# Patient Record
Sex: FEMALE | Race: BLACK OR AFRICAN AMERICAN | NOT HISPANIC OR LATINO | Employment: FULL TIME
[De-identification: names, ages, dates, MRNs, and addresses within clinical notes are randomized per-mention and may not be internally consistent; named-entity substitution may affect disease eponyms.]

---

## 2022-12-05 ENCOUNTER — HOSPITAL ENCOUNTER (OUTPATIENT)
Dept: DATA CONVERSION | Age: 57
End: 2022-12-05
Attending: EMERGENCY MEDICINE

## 2023-03-20 DIAGNOSIS — F17.200 NICOTINE DEPENDENCE, UNCOMPLICATED, UNSPECIFIED NICOTINE PRODUCT TYPE: Primary | ICD-10-CM

## 2023-03-20 RX ORDER — VARENICLINE TARTRATE 1 MG/1
1 TABLET, FILM COATED ORAL 2 TIMES DAILY
Qty: 180 TABLET | Refills: 1 | Status: SHIPPED | OUTPATIENT
Start: 2023-03-20 | End: 2023-08-15 | Stop reason: ALTCHOICE

## 2023-04-06 DIAGNOSIS — Z72.0 TOBACCO USE: Primary | ICD-10-CM

## 2023-06-09 DIAGNOSIS — E11.69 TYPE 2 DIABETES MELLITUS WITH OTHER SPECIFIED COMPLICATION, WITH LONG-TERM CURRENT USE OF INSULIN (MULTI): Primary | ICD-10-CM

## 2023-06-09 DIAGNOSIS — Z79.4 TYPE 2 DIABETES MELLITUS WITH OTHER SPECIFIED COMPLICATION, WITH LONG-TERM CURRENT USE OF INSULIN (MULTI): Primary | ICD-10-CM

## 2023-06-09 DIAGNOSIS — E78.5 HYPERLIPIDEMIA, UNSPECIFIED HYPERLIPIDEMIA TYPE: ICD-10-CM

## 2023-06-09 DIAGNOSIS — K21.9 GASTROESOPHAGEAL REFLUX DISEASE, UNSPECIFIED WHETHER ESOPHAGITIS PRESENT: ICD-10-CM

## 2023-06-09 RX ORDER — AMLODIPINE BESYLATE 10 MG/1
1 TABLET ORAL DAILY
COMMUNITY
Start: 2015-08-12 | End: 2023-08-15 | Stop reason: SDUPTHER

## 2023-06-09 RX ORDER — ATORVASTATIN CALCIUM 40 MG/1
40 TABLET, FILM COATED ORAL DAILY
Qty: 90 TABLET | Refills: 0 | Status: SHIPPED | OUTPATIENT
Start: 2023-06-09 | End: 2023-08-31

## 2023-06-09 RX ORDER — INSULIN GLARGINE 100 [IU]/ML
INJECTION, SOLUTION SUBCUTANEOUS
Qty: 15 ML | Refills: 0 | Status: SHIPPED | OUTPATIENT
Start: 2023-06-09 | End: 2023-08-26

## 2023-06-09 RX ORDER — BLOOD SUGAR DIAGNOSTIC
STRIP MISCELLANEOUS
Qty: 400 STRIP | Refills: 0 | Status: SHIPPED | OUTPATIENT
Start: 2023-06-09 | End: 2023-08-15 | Stop reason: SDUPTHER

## 2023-06-09 RX ORDER — INSULIN GLARGINE 100 [IU]/ML
INJECTION, SOLUTION SUBCUTANEOUS
COMMUNITY
Start: 2021-04-29 | End: 2023-06-09 | Stop reason: SDUPTHER

## 2023-06-09 RX ORDER — OMEPRAZOLE 20 MG/1
20 CAPSULE, DELAYED RELEASE ORAL
COMMUNITY
Start: 2022-09-30 | End: 2023-06-09 | Stop reason: SDUPTHER

## 2023-06-09 RX ORDER — BLOOD SUGAR DIAGNOSTIC
STRIP MISCELLANEOUS 4 TIMES DAILY
COMMUNITY
Start: 2022-05-10 | End: 2023-06-09 | Stop reason: SDUPTHER

## 2023-06-09 RX ORDER — ATORVASTATIN CALCIUM 40 MG/1
40 TABLET, FILM COATED ORAL DAILY
COMMUNITY
Start: 2018-09-10 | End: 2023-06-09 | Stop reason: SDUPTHER

## 2023-06-09 RX ORDER — OMEPRAZOLE 20 MG/1
20 CAPSULE, DELAYED RELEASE ORAL
Qty: 90 CAPSULE | Refills: 0 | Status: SHIPPED | OUTPATIENT
Start: 2023-06-09 | End: 2023-09-28

## 2023-06-23 RX ORDER — NICOTINE 7MG/24HR
PATCH, TRANSDERMAL 24 HOURS TRANSDERMAL
Qty: 14 PATCH | Refills: 0 | Status: SHIPPED | OUTPATIENT
Start: 2023-06-23 | End: 2023-08-15 | Stop reason: WASHOUT

## 2023-07-25 ENCOUNTER — PATIENT MESSAGE (OUTPATIENT)
Dept: PRIMARY CARE | Facility: CLINIC | Age: 58
End: 2023-07-25
Payer: COMMERCIAL

## 2023-07-25 DIAGNOSIS — Z00.00 HEALTHCARE MAINTENANCE: Primary | ICD-10-CM

## 2023-07-27 DIAGNOSIS — N64.89 BREAST ASYMMETRY: Primary | ICD-10-CM

## 2023-08-02 DIAGNOSIS — I10 PRIMARY HYPERTENSION: Primary | ICD-10-CM

## 2023-08-03 RX ORDER — LOSARTAN POTASSIUM 50 MG/1
50 TABLET ORAL DAILY
Qty: 90 TABLET | Refills: 0 | Status: SHIPPED | OUTPATIENT
Start: 2023-08-03 | End: 2023-08-15 | Stop reason: ALTCHOICE

## 2023-08-03 RX ORDER — METOPROLOL SUCCINATE 50 MG/1
50 TABLET, EXTENDED RELEASE ORAL DAILY
Qty: 90 TABLET | Refills: 0 | Status: SHIPPED | OUTPATIENT
Start: 2023-08-03 | End: 2023-10-26

## 2023-08-15 ENCOUNTER — OFFICE VISIT (OUTPATIENT)
Dept: PRIMARY CARE | Facility: CLINIC | Age: 58
End: 2023-08-15
Payer: COMMERCIAL

## 2023-08-15 VITALS
WEIGHT: 221.6 LBS | OXYGEN SATURATION: 97 % | BODY MASS INDEX: 36.92 KG/M2 | SYSTOLIC BLOOD PRESSURE: 108 MMHG | HEART RATE: 67 BPM | HEIGHT: 65 IN | DIASTOLIC BLOOD PRESSURE: 52 MMHG | RESPIRATION RATE: 16 BRPM

## 2023-08-15 DIAGNOSIS — F17.200 NICOTINE DEPENDENCE, UNCOMPLICATED, UNSPECIFIED NICOTINE PRODUCT TYPE: ICD-10-CM

## 2023-08-15 DIAGNOSIS — Z79.4 TYPE 2 DIABETES MELLITUS WITH OTHER SPECIFIED COMPLICATION, WITH LONG-TERM CURRENT USE OF INSULIN (MULTI): ICD-10-CM

## 2023-08-15 DIAGNOSIS — Z23 ENCOUNTER FOR ADMINISTRATION OF VACCINE: ICD-10-CM

## 2023-08-15 DIAGNOSIS — N18.2 CKD (CHRONIC KIDNEY DISEASE) STAGE 2, GFR 60-89 ML/MIN: ICD-10-CM

## 2023-08-15 DIAGNOSIS — I10 PRIMARY HYPERTENSION: ICD-10-CM

## 2023-08-15 DIAGNOSIS — E11.69 TYPE 2 DIABETES MELLITUS WITH OTHER SPECIFIED COMPLICATION, WITH LONG-TERM CURRENT USE OF INSULIN (MULTI): ICD-10-CM

## 2023-08-15 DIAGNOSIS — E78.2 MIXED HYPERLIPIDEMIA: Primary | ICD-10-CM

## 2023-08-15 PROBLEM — E78.5 HYPERLIPIDEMIA: Status: ACTIVE | Noted: 2023-08-15

## 2023-08-15 PROBLEM — E11.9 DIABETES (MULTI): Status: ACTIVE | Noted: 2023-08-15
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
## 2023-08-15 PROBLEM — M65.949 TENOSYNOVITIS OF FINGER: Status: ACTIVE | Noted: 2023-08-15

## 2023-08-15 PROBLEM — G47.33 OSA (OBSTRUCTIVE SLEEP APNEA): Status: ACTIVE | Noted: 2023-08-15

## 2023-08-15 PROBLEM — G62.9 NEUROPATHY: Status: ACTIVE | Noted: 2023-08-15

## 2023-08-15 PROBLEM — F33.1 MODERATE EPISODE OF RECURRENT MAJOR DEPRESSIVE DISORDER (MULTI): Chronic | Status: ACTIVE | Noted: 2021-08-18

## 2023-08-15 PROBLEM — M20.42 HAMMER TOES, BILATERAL: Status: ACTIVE | Noted: 2023-08-15

## 2023-08-15 PROBLEM — B96.89 BACTERIAL VAGINOSIS: Status: ACTIVE | Noted: 2023-08-15

## 2023-08-15 PROBLEM — M17.12 ARTHRITIS OF KNEE, LEFT: Status: ACTIVE | Noted: 2023-08-15

## 2023-08-15 PROBLEM — H81.10 BENIGN POSITIONAL VERTIGO: Status: ACTIVE | Noted: 2023-08-15

## 2023-08-15 PROBLEM — E11.9 ENCOUNTER FOR DIABETIC FOOT EXAM (MULTI): Status: ACTIVE | Noted: 2023-08-15

## 2023-08-15 PROBLEM — H52.03 HYPERMETROPIA OF BOTH EYES: Status: ACTIVE | Noted: 2023-08-15

## 2023-08-15 PROBLEM — N76.0 BACTERIAL VAGINOSIS: Status: ACTIVE | Noted: 2023-08-15

## 2023-08-15 PROBLEM — L84 CALLUS: Status: ACTIVE | Noted: 2023-08-15

## 2023-08-15 PROBLEM — R11.0 NAUSEA IN ADULT: Status: ACTIVE | Noted: 2023-08-15

## 2023-08-15 PROBLEM — H54.7 POOR VISION: Status: ACTIVE | Noted: 2023-08-15

## 2023-08-15 PROBLEM — J44.9 COPD (CHRONIC OBSTRUCTIVE PULMONARY DISEASE) (MULTI): Status: ACTIVE | Noted: 2023-08-15

## 2023-08-15 PROBLEM — J20.9 ACUTE BRONCHITIS: Status: ACTIVE | Noted: 2023-08-15

## 2023-08-15 PROBLEM — F41.1 GENERALIZED ANXIETY DISORDER: Status: ACTIVE | Noted: 2021-08-18

## 2023-08-15 PROBLEM — E11.40 DIABETIC NEUROPATHY (MULTI): Status: ACTIVE | Noted: 2023-08-15

## 2023-08-15 PROBLEM — N89.8 VAGINAL DISCHARGE: Status: ACTIVE | Noted: 2023-08-15

## 2023-08-15 PROBLEM — F10.21 ALCOHOL DEPENDENCE, IN REMISSION (MULTI): Chronic | Status: ACTIVE | Noted: 2021-08-18

## 2023-08-15 PROBLEM — M19.90 OSTEOARTHRITIS, CHRONIC: Status: ACTIVE | Noted: 2023-08-15

## 2023-08-15 PROBLEM — E11.9 TYPE 2 DIABETES MELLITUS WITHOUT COMPLICATIONS (MULTI): Status: ACTIVE | Noted: 2023-08-15

## 2023-08-15 PROBLEM — K21.9 GERD (GASTROESOPHAGEAL REFLUX DISEASE): Status: ACTIVE | Noted: 2023-08-15

## 2023-08-15 PROBLEM — N89.8 VAGINAL ITCHING: Status: ACTIVE | Noted: 2023-08-15

## 2023-08-15 PROBLEM — R29.818 SUSPECTED SLEEP APNEA: Status: ACTIVE | Noted: 2023-08-15

## 2023-08-15 PROBLEM — H61.21 IMPACTED CERUMEN OF RIGHT EAR: Status: ACTIVE | Noted: 2023-08-15

## 2023-08-15 PROBLEM — N95.1 HOT FLUSHES, PERIMENOPAUSAL: Status: ACTIVE | Noted: 2023-08-15

## 2023-08-15 PROBLEM — F12.21 CANNABIS DEPENDENCE, IN REMISSION (MULTI): Chronic | Status: ACTIVE | Noted: 2021-08-18

## 2023-08-15 PROBLEM — M65.9 TENOSYNOVITIS OF FINGER: Status: ACTIVE | Noted: 2023-08-15

## 2023-08-15 PROBLEM — N18.9 CHRONIC KIDNEY DISEASE, UNSPECIFIED: Status: ACTIVE | Noted: 2021-08-18

## 2023-08-15 PROBLEM — M20.41 HAMMER TOES, BILATERAL: Status: ACTIVE | Noted: 2023-08-15

## 2023-08-15 PROBLEM — F32.A DEPRESSION: Status: ACTIVE | Noted: 2023-08-15

## 2023-08-15 PROBLEM — H52.13 MYOPIA OF BOTH EYES: Status: ACTIVE | Noted: 2023-08-15

## 2023-08-15 PROBLEM — A59.9 TRICHOMONAS INFECTION: Status: ACTIVE | Noted: 2023-08-15

## 2023-08-15 PROBLEM — G47.00 INSOMNIA: Status: ACTIVE | Noted: 2023-08-15

## 2023-08-15 PROBLEM — E66.01 MORBID OBESITY (MULTI): Status: ACTIVE | Noted: 2023-08-15

## 2023-08-15 PROCEDURE — 99396 PREV VISIT EST AGE 40-64: CPT | Performed by: INTERNAL MEDICINE

## 2023-08-15 PROCEDURE — 90471 IMMUNIZATION ADMIN: CPT | Performed by: INTERNAL MEDICINE

## 2023-08-15 PROCEDURE — 90472 IMMUNIZATION ADMIN EACH ADD: CPT | Performed by: INTERNAL MEDICINE

## 2023-08-15 PROCEDURE — 3074F SYST BP LT 130 MM HG: CPT | Performed by: INTERNAL MEDICINE

## 2023-08-15 PROCEDURE — 3078F DIAST BP <80 MM HG: CPT | Performed by: INTERNAL MEDICINE

## 2023-08-15 PROCEDURE — 4004F PT TOBACCO SCREEN RCVD TLK: CPT | Performed by: INTERNAL MEDICINE

## 2023-08-15 PROCEDURE — 3052F HG A1C>EQUAL 8.0%<EQUAL 9.0%: CPT | Performed by: INTERNAL MEDICINE

## 2023-08-15 PROCEDURE — 90686 IIV4 VACC NO PRSV 0.5 ML IM: CPT | Performed by: INTERNAL MEDICINE

## 2023-08-15 PROCEDURE — 90677 PCV20 VACCINE IM: CPT | Performed by: INTERNAL MEDICINE

## 2023-08-15 RX ORDER — MULTIVITAMIN
1 TABLET ORAL DAILY
COMMUNITY
Start: 2020-05-04

## 2023-08-15 RX ORDER — LISINOPRIL 20 MG/1
1 TABLET ORAL 2 TIMES DAILY
COMMUNITY
Start: 2016-06-16 | End: 2023-08-15 | Stop reason: ALTCHOICE

## 2023-08-15 RX ORDER — BLOOD SUGAR DIAGNOSTIC
STRIP MISCELLANEOUS
Qty: 400 STRIP | Refills: 0 | Status: SHIPPED | OUTPATIENT
Start: 2023-08-15 | End: 2024-01-29

## 2023-08-15 RX ORDER — INSULIN LISPRO 100 [IU]/ML
INJECTION, SOLUTION INTRAVENOUS; SUBCUTANEOUS
COMMUNITY
Start: 2021-03-12 | End: 2023-08-15 | Stop reason: SDUPTHER

## 2023-08-15 RX ORDER — FLASH GLUCOSE SENSOR
KIT MISCELLANEOUS
Qty: 1 EACH | Refills: 0 | Status: SHIPPED | OUTPATIENT
Start: 2023-08-15 | End: 2023-08-31 | Stop reason: SDUPTHER

## 2023-08-15 RX ORDER — VARENICLINE TARTRATE 1 MG/1
1 TABLET, FILM COATED ORAL 2 TIMES DAILY
COMMUNITY
End: 2023-11-21 | Stop reason: SDUPTHER

## 2023-08-15 RX ORDER — ACETAMINOPHEN 500 MG
TABLET ORAL
COMMUNITY

## 2023-08-15 RX ORDER — BUPROPION HYDROCHLORIDE 150 MG/1
1 TABLET ORAL DAILY
COMMUNITY

## 2023-08-15 RX ORDER — TRAZODONE HYDROCHLORIDE 100 MG/1
200 TABLET ORAL
COMMUNITY
Start: 2023-06-01

## 2023-08-15 RX ORDER — DULAGLUTIDE 0.75 MG/.5ML
0.75 INJECTION, SOLUTION SUBCUTANEOUS
Qty: 2 ML | Refills: 2 | Status: SHIPPED | OUTPATIENT
Start: 2023-08-15 | End: 2023-09-14 | Stop reason: DRUGHIGH

## 2023-08-15 RX ORDER — AMLODIPINE BESYLATE 10 MG/1
10 TABLET ORAL DAILY
Qty: 90 TABLET | Refills: 1 | Status: SHIPPED | OUTPATIENT
Start: 2023-08-15 | End: 2024-03-26

## 2023-08-15 RX ORDER — GABAPENTIN 100 MG/1
CAPSULE ORAL
COMMUNITY
Start: 2022-07-14

## 2023-08-15 RX ORDER — INSULIN LISPRO 100 [IU]/ML
INJECTION, SOLUTION INTRAVENOUS; SUBCUTANEOUS
COMMUNITY
Start: 2021-08-28 | End: 2023-08-22 | Stop reason: SDDI

## 2023-08-15 RX ORDER — ALBUTEROL SULFATE 90 UG/1
AEROSOL, METERED RESPIRATORY (INHALATION)
COMMUNITY
Start: 2016-05-18

## 2023-08-15 RX ORDER — TRAZODONE HYDROCHLORIDE 100 MG/1
2 TABLET ORAL NIGHTLY
COMMUNITY
Start: 2015-08-04 | End: 2023-08-15 | Stop reason: ALTCHOICE

## 2023-08-15 RX ORDER — INSULIN LISPRO 100 [IU]/ML
6 INJECTION, SOLUTION INTRAVENOUS; SUBCUTANEOUS
Qty: 6 EACH | Refills: 2 | Status: SHIPPED | OUTPATIENT
Start: 2023-08-15 | End: 2023-08-22 | Stop reason: SDDI

## 2023-08-15 ASSESSMENT — ENCOUNTER SYMPTOMS
SINUS PRESSURE: 0
BACK PAIN: 0
DIZZINESS: 0
DIAPHORESIS: 0
WHEEZING: 0
PALPITATIONS: 0
JOINT SWELLING: 0
LOSS OF SENSATION IN FEET: 0
CONSTIPATION: 0
NUMBNESS: 0
BLOOD IN STOOL: 0
NECK STIFFNESS: 0
FEVER: 0
ABDOMINAL PAIN: 0
RHINORRHEA: 0
FREQUENCY: 0
OCCASIONAL FEELINGS OF UNSTEADINESS: 0
DEPRESSION: 0
ARTHRALGIAS: 0
APPETITE CHANGE: 0
MYALGIAS: 0
CHILLS: 0
HEADACHES: 0
NECK PAIN: 0
NAUSEA: 0
DIARRHEA: 0
SHORTNESS OF BREATH: 0
VOMITING: 0
SORE THROAT: 0
WEAKNESS: 0
DIFFICULTY URINATING: 0
FATIGUE: 0
HEMATURIA: 0
COUGH: 0
DYSURIA: 0
ABDOMINAL DISTENTION: 0
LIGHT-HEADEDNESS: 0

## 2023-08-15 ASSESSMENT — PATIENT HEALTH QUESTIONNAIRE - PHQ9
2. FEELING DOWN, DEPRESSED OR HOPELESS: NOT AT ALL
SUM OF ALL RESPONSES TO PHQ9 QUESTIONS 1 AND 2: 0
1. LITTLE INTEREST OR PLEASURE IN DOING THINGS: NOT AT ALL

## 2023-08-15 NOTE — PROGRESS NOTES
"Subjective   Patient ID: Linnette Rm is a 58 y.o. female who presents for Annual Exam (Medicare wellness  visit).    HPI   She is doing well generally.     Review of Systems   Constitutional:  Negative for appetite change, chills, diaphoresis, fatigue and fever.   HENT:  Negative for congestion, ear discharge, ear pain, hearing loss, postnasal drip, rhinorrhea, sinus pressure, sore throat and tinnitus.    Eyes:  Negative for visual disturbance.   Respiratory:  Negative for cough, shortness of breath and wheezing.    Cardiovascular:  Negative for chest pain, palpitations and leg swelling.   Gastrointestinal:  Negative for abdominal distention, abdominal pain, blood in stool, constipation, diarrhea, nausea and vomiting.   Genitourinary:  Negative for decreased urine volume, difficulty urinating, dysuria, frequency, hematuria and urgency.   Musculoskeletal:  Negative for arthralgias, back pain, gait problem, joint swelling, myalgias, neck pain and neck stiffness.   Skin:  Negative for rash.   Neurological:  Negative for dizziness, weakness, light-headedness, numbness and headaches.       Objective   /52 (BP Location: Right arm, Patient Position: Sitting, BP Cuff Size: Large adult)   Pulse 67   Resp 16   Ht 1.651 m (5' 5\")   Wt 101 kg (221 lb 9.6 oz)   SpO2 97%   BMI 36.88 kg/m²     Physical Exam  Vitals reviewed.   Constitutional:       Appearance: Normal appearance. She is normal weight.   HENT:      Right Ear: Tympanic membrane and external ear normal.      Left Ear: Tympanic membrane and external ear normal.   Eyes:      Extraocular Movements: Extraocular movements intact.      Conjunctiva/sclera: Conjunctivae normal.      Pupils: Pupils are equal, round, and reactive to light.   Cardiovascular:      Rate and Rhythm: Normal rate and regular rhythm.      Pulses: Normal pulses.   Pulmonary:      Effort: Pulmonary effort is normal.      Breath sounds: Normal breath sounds.   Abdominal:      " General: Bowel sounds are normal.      Palpations: Abdomen is soft.   Musculoskeletal:         General: Normal range of motion.      Cervical back: Normal range of motion.   Skin:     General: Skin is warm and dry.   Neurological:      General: No focal deficit present.      Mental Status: She is oriented to person, place, and time.   Psychiatric:         Mood and Affect: Mood normal.         Behavior: Behavior normal.         Assessment/Plan   Problem List Items Addressed This Visit       Diabetes (CMS/[facility])     - HbA1C above target goal <7%  - Repeat A1C   - Currently on lispro 8 U TID and lantus 10 U at bedtime   -We discussed starting GLP1 agonist such as Trulicity  -She has never taken Metformin because she read about side effects. We discussed metformin is safe for eGFR above 30   -Goal is to DC insulin if blood sugars get under control with a GLP1 agonist and biguanide/SGLT2 inhibitor per insurance coverage   -[facility] pharmacy referral placed.          Relevant Medications    insulin lispro (HumaLOG) 100 unit/mL injection    OneTouch Ultra Test strip    insulin lispro (HumaLOG KwikPen Insulin) 100 unit/mL injection    FreeStyle Nanci sensor system (FreeStyle Nanci 2 Sensor) kit    dulaglutide (Trulicity) 0.75 mg/0.5 mL pen injector    Other Relevant Orders    Follow Up In [facility] - Pharmacy    Hyperlipidemia - Primary    Hypertension     -BP below target goal 130/80  -CMP, TSH ordered  -Continue current antihypertensives (Currently taking amlodipine and metoprolol, patient not sure if she is taking losartan or lisinopril, will message which she is taking, if any).  -Counselled on weight loss low sodium diet, DASH diet and exercise            Relevant Medications    amLODIPine (Norvasc) 10 mg tablet    CKD (chronic kidney disease) stage 2, GFR 60-89 ml/min     - Repeat CBC, CMP, urine microalbumin  -Medication list reviewed  -Counselled on low sodium diet and need to keep blood pressures well  controlled  -Educated on avoidance of toxic medications such as NSAIDs (ibuprofen, Aleve, Motrin, diclofenac, Advil)  -Drink adequate quantities of water to remain hydrated.              Relevant Orders    Albumin , Urine Random    Nicotine dependence, unspecified, uncomplicated     She is down to 1 or 2 cigarettes per day on varenicline.          Relevant Medications    varenicline (Chantix) 1 mg tablet     Other Visit Diagnoses       Encounter for administration of vaccine        Relevant Orders    Pneumococcal conjugate vaccine, 20-valent, adult (PREVNAR 20) (Completed)    Flu vaccine (IIV4) age 3 years and greater, preservative free (Completed)        Labs were ordered before visit, patient will get it done today.    RTC in 3 mo

## 2023-08-15 NOTE — ASSESSMENT & PLAN NOTE
- Repeat CBC, CMP, urine microalbumin  -Medication list reviewed  -Counselled on low sodium diet and need to keep blood pressures well controlled  -Educated on avoidance of toxic medications such as NSAIDs (ibuprofen, Aleve, Motrin, diclofenac, Advil)  -Drink adequate quantities of water to remain hydrated.

## 2023-08-15 NOTE — ASSESSMENT & PLAN NOTE
- HbA1C above target goal <7%  - Repeat A1C   - Currently on lispro 8 U TID and lantus 10 U at bedtime   -We discussed starting GLP1 agonist such as Trulicity  -She has never taken Metformin because she read about side effects. We discussed metformin is safe for eGFR above 30   -Goal is to DC insulin if blood sugars get under control with a GLP1 agonist and biguanide/SGLT2 inhibitor per insurance coverage   -[facility] pharmacy referral placed.

## 2023-08-15 NOTE — ASSESSMENT & PLAN NOTE
-BP below target goal 130/80  -CMP, TSH ordered  -Continue current antihypertensives (Currently taking amlodipine and metoprolol, patient not sure if she is taking losartan or lisinopril, will message which she is taking, if any).  -Counselled on weight loss low sodium diet, DASH diet and exercise

## 2023-08-17 ENCOUNTER — LAB (OUTPATIENT)
Dept: LAB | Facility: LAB | Age: 58
End: 2023-08-17
Payer: COMMERCIAL

## 2023-08-17 DIAGNOSIS — E11.69 TYPE 2 DIABETES MELLITUS WITH OTHER SPECIFIED COMPLICATION, WITH LONG-TERM CURRENT USE OF INSULIN (MULTI): ICD-10-CM

## 2023-08-17 DIAGNOSIS — N18.2 CKD (CHRONIC KIDNEY DISEASE) STAGE 2, GFR 60-89 ML/MIN: ICD-10-CM

## 2023-08-17 DIAGNOSIS — Z79.4 TYPE 2 DIABETES MELLITUS WITH OTHER SPECIFIED COMPLICATION, WITH LONG-TERM CURRENT USE OF INSULIN (MULTI): ICD-10-CM

## 2023-08-17 DIAGNOSIS — Z00.00 HEALTHCARE MAINTENANCE: ICD-10-CM

## 2023-08-17 LAB
ALANINE AMINOTRANSFERASE (SGPT) (U/L) IN SER/PLAS: 18 U/L (ref 7–45)
ALBUMIN (G/DL) IN SER/PLAS: 4.1 G/DL (ref 3.4–5)
ALBUMIN (MG/L) IN URINE: 353 MG/L
ALBUMIN/CREATININE (UG/MG) IN URINE: 180.1 UG/MG CRT (ref 0–30)
ALKALINE PHOSPHATASE (U/L) IN SER/PLAS: 112 U/L (ref 33–110)
ANION GAP IN SER/PLAS: 16 MMOL/L (ref 10–20)
ASPARTATE AMINOTRANSFERASE (SGOT) (U/L) IN SER/PLAS: 15 U/L (ref 9–39)
BASOPHILS (10*3/UL) IN BLOOD BY AUTOMATED COUNT: 0.03 X10E9/L (ref 0–0.1)
BASOPHILS/100 LEUKOCYTES IN BLOOD BY AUTOMATED COUNT: 0.4 % (ref 0–2)
BILIRUBIN TOTAL (MG/DL) IN SER/PLAS: 0.4 MG/DL (ref 0–1.2)
CALCIUM (MG/DL) IN SER/PLAS: 9.3 MG/DL (ref 8.6–10.6)
CARBON DIOXIDE, TOTAL (MMOL/L) IN SER/PLAS: 27 MMOL/L (ref 21–32)
CHLORIDE (MMOL/L) IN SER/PLAS: 102 MMOL/L (ref 98–107)
CHOLESTEROL (MG/DL) IN SER/PLAS: 173 MG/DL (ref 0–199)
CHOLESTEROL IN HDL (MG/DL) IN SER/PLAS: 39 MG/DL
CHOLESTEROL/HDL RATIO: 4.4
CREATININE (MG/DL) IN SER/PLAS: 1.3 MG/DL (ref 0.5–1.05)
CREATININE (MG/DL) IN URINE: 196 MG/DL (ref 20–320)
EOSINOPHILS (10*3/UL) IN BLOOD BY AUTOMATED COUNT: 0.26 X10E9/L (ref 0–0.7)
EOSINOPHILS/100 LEUKOCYTES IN BLOOD BY AUTOMATED COUNT: 3.2 % (ref 0–6)
ERYTHROCYTE DISTRIBUTION WIDTH (RATIO) BY AUTOMATED COUNT: 15 % (ref 11.5–14.5)
ERYTHROCYTE MEAN CORPUSCULAR HEMOGLOBIN CONCENTRATION (G/DL) BY AUTOMATED: 31.4 G/DL (ref 32–36)
ERYTHROCYTE MEAN CORPUSCULAR VOLUME (FL) BY AUTOMATED COUNT: 96 FL (ref 80–100)
ERYTHROCYTES (10*6/UL) IN BLOOD BY AUTOMATED COUNT: 4.07 X10E12/L (ref 4–5.2)
ESTIMATED AVERAGE GLUCOSE FOR HBA1C: 229 MG/DL
GFR FEMALE: 48 ML/MIN/1.73M2
GLUCOSE (MG/DL) IN SER/PLAS: 270 MG/DL (ref 74–99)
HEMATOCRIT (%) IN BLOOD BY AUTOMATED COUNT: 39.2 % (ref 36–46)
HEMOGLOBIN (G/DL) IN BLOOD: 12.3 G/DL (ref 12–16)
HEMOGLOBIN A1C/HEMOGLOBIN TOTAL IN BLOOD: 9.6 %
IMMATURE GRANULOCYTES/100 LEUKOCYTES IN BLOOD BY AUTOMATED COUNT: 0.6 % (ref 0–0.9)
LDL: 101 MG/DL (ref 0–99)
LEUKOCYTES (10*3/UL) IN BLOOD BY AUTOMATED COUNT: 8.2 X10E9/L (ref 4.4–11.3)
LYMPHOCYTES (10*3/UL) IN BLOOD BY AUTOMATED COUNT: 3.86 X10E9/L (ref 1.2–4.8)
LYMPHOCYTES/100 LEUKOCYTES IN BLOOD BY AUTOMATED COUNT: 47.2 % (ref 13–44)
MONOCYTES (10*3/UL) IN BLOOD BY AUTOMATED COUNT: 0.53 X10E9/L (ref 0.1–1)
MONOCYTES/100 LEUKOCYTES IN BLOOD BY AUTOMATED COUNT: 6.5 % (ref 2–10)
NEUTROPHILS (10*3/UL) IN BLOOD BY AUTOMATED COUNT: 3.45 X10E9/L (ref 1.2–7.7)
NEUTROPHILS/100 LEUKOCYTES IN BLOOD BY AUTOMATED COUNT: 42.1 % (ref 40–80)
NRBC (PER 100 WBCS) BY AUTOMATED COUNT: 0 /100 WBC (ref 0–0)
PLATELETS (10*3/UL) IN BLOOD AUTOMATED COUNT: 225 X10E9/L (ref 150–450)
POTASSIUM (MMOL/L) IN SER/PLAS: 4.8 MMOL/L (ref 3.5–5.3)
PROTEIN TOTAL: 7.4 G/DL (ref 6.4–8.2)
SODIUM (MMOL/L) IN SER/PLAS: 140 MMOL/L (ref 136–145)
THYROTROPIN (MIU/L) IN SER/PLAS BY DETECTION LIMIT <= 0.05 MIU/L: 0.89 MIU/L (ref 0.44–3.98)
TRIGLYCERIDE (MG/DL) IN SER/PLAS: 167 MG/DL (ref 0–149)
UREA NITROGEN (MG/DL) IN SER/PLAS: 24 MG/DL (ref 6–23)
VLDL: 33 MG/DL (ref 0–40)

## 2023-08-17 PROCEDURE — 82043 UR ALBUMIN QUANTITATIVE: CPT

## 2023-08-17 PROCEDURE — 84443 ASSAY THYROID STIM HORMONE: CPT

## 2023-08-17 PROCEDURE — 36415 COLL VENOUS BLD VENIPUNCTURE: CPT

## 2023-08-17 PROCEDURE — 85025 COMPLETE CBC W/AUTO DIFF WBC: CPT

## 2023-08-17 PROCEDURE — 80053 COMPREHEN METABOLIC PANEL: CPT

## 2023-08-17 PROCEDURE — 80061 LIPID PANEL: CPT

## 2023-08-17 PROCEDURE — 83036 HEMOGLOBIN GLYCOSYLATED A1C: CPT

## 2023-08-17 PROCEDURE — 82570 ASSAY OF URINE CREATININE: CPT

## 2023-08-22 ENCOUNTER — TELEMEDICINE (OUTPATIENT)
Dept: PHARMACY | Facility: HOSPITAL | Age: 58
End: 2023-08-22
Payer: COMMERCIAL

## 2023-08-22 DIAGNOSIS — E11.69 TYPE 2 DIABETES MELLITUS WITH OTHER SPECIFIED COMPLICATION, WITH LONG-TERM CURRENT USE OF INSULIN (MULTI): ICD-10-CM

## 2023-08-22 DIAGNOSIS — Z79.4 TYPE 2 DIABETES MELLITUS WITH OTHER SPECIFIED COMPLICATION, WITH LONG-TERM CURRENT USE OF INSULIN (MULTI): ICD-10-CM

## 2023-08-22 DIAGNOSIS — E11.65 TYPE 2 DIABETES MELLITUS WITH HYPERGLYCEMIA, WITHOUT LONG-TERM CURRENT USE OF INSULIN (MULTI): Primary | ICD-10-CM

## 2023-08-22 RX ORDER — DAPAGLIFLOZIN 10 MG/1
10 TABLET, FILM COATED ORAL DAILY
Qty: 30 TABLET | Refills: 5 | Status: SHIPPED | OUTPATIENT
Start: 2023-08-22 | End: 2023-08-22

## 2023-08-22 RX ORDER — DAPAGLIFLOZIN 10 MG/1
10 TABLET, FILM COATED ORAL DAILY
Qty: 30 TABLET | Refills: 2 | Status: SHIPPED | OUTPATIENT
Start: 2023-08-22 | End: 2023-10-12 | Stop reason: SDUPTHER

## 2023-08-22 NOTE — RESULT ENCOUNTER NOTE
Blood sugars poorly controlled. Starting trulicity 0.75 mg weekly (ordered on 8/15/2023), will gradually up titrate the dose to improve blood sugar control.     CKD stage IIIb. Will continue to monitor.  Drink adequate water.  Avoid medications which can harm the kidneys such as NSAIDs [ibuprofen, Advil, Aleve, Motrin].  Also need to get better control of blood sugars to avoid further harm to the  kidneys.    Labs otherwise unremarkable.

## 2023-08-22 NOTE — PROGRESS NOTES
"Pharmacist Clinic: Diabetes Management  Linnette Rm is a 58 y.o. female was referred to Clinical Pharmacy Team for diabetes management.     Referring Provider: Silvio Carr MD     HISTORY OF PRESENT ILLNESS  Approximate Date of Diagnosis: 2 years ago  Known complications due to diabetes included chronic kidney disease    Diet: patient states she is always snacking, she works at [employer] and is always around food  - 2 meals per day  - Breakfast: skips  - Lunch: crackers, chicken, shrimp, soup  - Dinner: hamburgers, pork chops, chicken, burger ruchi, applebees   - Drinks: water, no pop     Exercise Routine:   - walks around all day at work     LAB REVIEW   Glucose (mg/dL)   Date Value   08/17/2023 270 (H)   02/14/2023 160 (H)   05/25/2022 167 (H)     Hemoglobin A1C (%)   Date Value   08/17/2023 9.6 (A)   02/14/2023 8.0 (A)   12/06/2021 7.1 (A)     Bicarbonate (mmol/L)   Date Value   08/17/2023 27   02/14/2023 26   05/25/2022 28     Urea Nitrogen (mg/dL)   Date Value   08/17/2023 24 (H)   02/14/2023 19   05/25/2022 22     Creatinine (mg/dL)   Date Value   08/17/2023 1.30 (H)   02/14/2023 1.03   05/25/2022 1.16 (H)     Lab Results   Component Value Date    HGBA1C 9.6 (A) 08/17/2023    HGBA1C 8.0 (A) 02/14/2023    HGBA1C 7.1 (A) 12/06/2021     Lab Results   Component Value Date    CHOL 173 08/17/2023    CHOL 150 02/14/2023    CHOL 147 12/06/2021     Lab Results   Component Value Date    HDL 39.0 (A) 08/17/2023    HDL 42.3 02/14/2023    HDL 43.7 12/06/2021     No results found for: \"LDLCALC\"  Lab Results   Component Value Date    TRIG 167 (H) 08/17/2023    TRIG 120 02/14/2023    TRIG 105 12/06/2021       DIABETES ASSESSMENT    CURRENT PHARMACOTHERAPY  - lantus 8 units once daily (at night, maybe takes it 3-4 times per week)   - humalog 6 units three times daily (patient takes it if she has starch in a meal, or if she has a big meal; she takes it maybe 2 times per week)     SECONDARY PREVENTION  - " Statin? Yes  - ACE-I/ARB? No    HISTORICAL PHARMACOTHERAPY: none    SMBG MEASUREMENTS: patient doesn't test at this time    DISCUSSION:   Insulin: this is a medication you take every day, or you do not take at all. It will be hard for us to understand how your medications are working if you do not use it as intructed, patient verbalized understanding   Continue lantus 8 units once daily   Discontinue humalog 6 units with meals  Trulicity: went over MOA, administration, expectations, side effects, etc.   Patient will come into the office on Thursday and we will go over the injection together   CGM: went over how this works, patient will come into the office on Thursday and we will apply it to her arm   Farxiga:   Discussed starting a new medication that will help protect your kidneys and improve your blood glucose  Went over MOA, oral administration, expectations, side effects, etc.   Metformin:   There are side effects with the medication, however the medication is safe and effective, she is not interested at this time but we discussed revisiting if alternative medications do not get her A1c to where it needs to be     RECOMMENDATIONS/PLAN  1. Patients diabetes is poorly controlled with most recent A1c of 9.6 % (goal < 7 %).   - Continue all meds under the continuation of care with the referring provider and clinical pharmacy team.  - Initiate trulicity 0.75 mg once weekly.   - Initiate farxiga 10 mg once daily.   - Discontinue humalog 6 units under with meals.     Clinical Pharmacist follow up: 1 week; 4:15 PM    Thank you,  Frieda Garcia, PharmD    Verbal consent to manage patient's drug therapy was obtained from [the patient or an individual authorized to act on behalf of a patient]. They were informed they may decline to participate or withdraw from participation in pharmacy services at any time.

## 2023-08-24 ENCOUNTER — APPOINTMENT (OUTPATIENT)
Dept: PHARMACY | Facility: HOSPITAL | Age: 58
End: 2023-08-24
Payer: COMMERCIAL

## 2023-08-24 ENCOUNTER — DOCUMENTATION (OUTPATIENT)
Dept: PRIMARY CARE | Facility: CLINIC | Age: 58
End: 2023-08-24

## 2023-08-24 NOTE — PROGRESS NOTES
Pharmacist Clinic: Diabetes Management  Linnette Rm is a 58 y.o. female was referred to Clinical Pharmacy Team for diabetes management.     Referring Provider: Silvio Carr MD     Today in office, Linnette activated her Freestyle Nanci 2, we placed a sensor on her arm, and she administered her first dose of Trulicity.     Clinical Pharmacist follow up: 1 week    Thank you,  Frieda Garcia, PharmD    Verbal consent to manage patient's drug therapy was obtained from the patient. They were informed they may decline to participate or withdraw from participation in pharmacy services at any time.

## 2023-08-25 DIAGNOSIS — E78.5 HYPERLIPIDEMIA, UNSPECIFIED HYPERLIPIDEMIA TYPE: ICD-10-CM

## 2023-08-25 DIAGNOSIS — E11.69 TYPE 2 DIABETES MELLITUS WITH OTHER SPECIFIED COMPLICATION, WITH LONG-TERM CURRENT USE OF INSULIN (MULTI): ICD-10-CM

## 2023-08-25 DIAGNOSIS — Z79.4 TYPE 2 DIABETES MELLITUS WITH OTHER SPECIFIED COMPLICATION, WITH LONG-TERM CURRENT USE OF INSULIN (MULTI): ICD-10-CM

## 2023-08-26 RX ORDER — INSULIN GLARGINE 100 [IU]/ML
INJECTION, SOLUTION SUBCUTANEOUS
Qty: 15 ML | Refills: 2 | Status: SHIPPED | OUTPATIENT
Start: 2023-08-26 | End: 2023-09-14 | Stop reason: ALTCHOICE

## 2023-08-31 ENCOUNTER — TELEMEDICINE (OUTPATIENT)
Dept: PHARMACY | Facility: HOSPITAL | Age: 58
End: 2023-08-31
Payer: COMMERCIAL

## 2023-08-31 DIAGNOSIS — E11.65 TYPE 2 DIABETES MELLITUS WITH HYPERGLYCEMIA, WITHOUT LONG-TERM CURRENT USE OF INSULIN (MULTI): Primary | ICD-10-CM

## 2023-08-31 RX ORDER — ATORVASTATIN CALCIUM 40 MG/1
40 TABLET, FILM COATED ORAL DAILY
Qty: 90 TABLET | Refills: 1 | Status: SHIPPED | OUTPATIENT
Start: 2023-08-31 | End: 2024-03-26

## 2023-08-31 RX ORDER — FLASH GLUCOSE SENSOR
KIT MISCELLANEOUS
Qty: 1 EACH | Refills: 0 | Status: SHIPPED | OUTPATIENT
Start: 2023-08-31 | End: 2023-09-06 | Stop reason: SDUPTHER

## 2023-08-31 NOTE — PROGRESS NOTES
"Pharmacist Clinic: Diabetes Management  Linnette Rm is a 58 y.o. female was referred to Clinical Pharmacy Team for diabetes management. At last visit, we started patient on trulicity 0.75 mg and a CGM was started.     Referring Provider: Silvio Carr MD     HISTORY OF PRESENT ILLNESS  Approximate Date of Diagnosis: 2 years ago  Known complications due to diabetes included chronic kidney disease    Diet: patient states she is always snacking, she works at [employer] and is always around food  - 2 meals per day  - Breakfast: skips  - Lunch: crackers, chicken, shrimp, soup  - Dinner: hamburgers, pork chops, chicken, burger ruchi, applebees   - Drinks: water, no pop     Exercise Routine:   - walks around all day at work     LAB REVIEW   Glucose (mg/dL)   Date Value   08/17/2023 270 (H)   02/14/2023 160 (H)   05/25/2022 167 (H)     Hemoglobin A1C (%)   Date Value   08/17/2023 9.6 (A)   02/14/2023 8.0 (A)   12/06/2021 7.1 (A)     Bicarbonate (mmol/L)   Date Value   08/17/2023 27   02/14/2023 26   05/25/2022 28     Urea Nitrogen (mg/dL)   Date Value   08/17/2023 24 (H)   02/14/2023 19   05/25/2022 22     Creatinine (mg/dL)   Date Value   08/17/2023 1.30 (H)   02/14/2023 1.03   05/25/2022 1.16 (H)     Lab Results   Component Value Date    HGBA1C 9.6 (A) 08/17/2023    HGBA1C 8.0 (A) 02/14/2023    HGBA1C 7.1 (A) 12/06/2021     Lab Results   Component Value Date    CHOL 173 08/17/2023    CHOL 150 02/14/2023    CHOL 147 12/06/2021     Lab Results   Component Value Date    HDL 39.0 (A) 08/17/2023    HDL 42.3 02/14/2023    HDL 43.7 12/06/2021     No results found for: \"LDLCALC\"  Lab Results   Component Value Date    TRIG 167 (H) 08/17/2023    TRIG 120 02/14/2023    TRIG 105 12/06/2021       DIABETES ASSESSMENT    CURRENT PHARMACOTHERAPY  - trulicity 0.75 mg under the skin once weekly   - farxiga 10 mg once daily (pt has not yet started, this will start on 9/13 in her next pill packs)    SECONDARY PREVENTION  - " Statin? Yes  - ACE-I/ARB? No    HISTORICAL PHARMACOTHERAPY:   - lantus 8 units once daily (at night, maybe takes it 3-4 times per week)   - humalog 6 units three times daily (patient takes it if she has starch in a meal, or if she has a big meal; she takes it maybe 2 times per week)     SMBG MEASUREMENTS: patient just started testing her sugars     DISCUSSION:   Insulin: patient has completely stopped taking her insulin (both basal and bolus)   Trulicity: patient administered her second dose on the phone today    CGM: went over how this works, patient will come into the office on Thursday and we will apply it to her arm   Farxiga: patient has not started this medication as her next pill pack isn't coming until 9/13    RECOMMENDATIONS/PLAN  1. Patients diabetes is poorly controlled with most recent A1c of 9.6 % (goal < 7 %).   - Continue all meds under the continuation of care with the referring provider and clinical pharmacy team.    Clinical Pharmacist follow up: 2 weeks  Thank you,  Frieda Garcia, PharmD    Verbal consent to manage patient's drug therapy was obtained from the patient. They were informed they may decline to participate or withdraw from participation in pharmacy services at any time.

## 2023-09-04 DIAGNOSIS — E11.69 TYPE 2 DIABETES MELLITUS WITH OTHER SPECIFIED COMPLICATION, WITH LONG-TERM CURRENT USE OF INSULIN (MULTI): ICD-10-CM

## 2023-09-04 DIAGNOSIS — Z79.4 TYPE 2 DIABETES MELLITUS WITH OTHER SPECIFIED COMPLICATION, WITH LONG-TERM CURRENT USE OF INSULIN (MULTI): ICD-10-CM

## 2023-09-06 RX ORDER — FLASH GLUCOSE SENSOR
KIT MISCELLANEOUS
Qty: 15 EACH | Refills: 0 | Status: SHIPPED | OUTPATIENT
Start: 2023-09-06 | End: 2023-09-28 | Stop reason: SDUPTHER

## 2023-09-14 ENCOUNTER — TELEMEDICINE (OUTPATIENT)
Dept: PHARMACY | Facility: HOSPITAL | Age: 58
End: 2023-09-14
Payer: COMMERCIAL

## 2023-09-14 DIAGNOSIS — E11.65 TYPE 2 DIABETES MELLITUS WITH HYPERGLYCEMIA, WITHOUT LONG-TERM CURRENT USE OF INSULIN (MULTI): Primary | ICD-10-CM

## 2023-09-14 RX ORDER — DULAGLUTIDE 1.5 MG/.5ML
1.5 INJECTION, SOLUTION SUBCUTANEOUS
Qty: 2 ML | Refills: 1 | Status: SHIPPED | OUTPATIENT
Start: 2023-09-14 | End: 2023-10-12 | Stop reason: SDUPTHER

## 2023-09-14 NOTE — PROGRESS NOTES
"Pharmacist Clinic: Diabetes Management  Linnette Rm is a 58 y.o. female was referred to Clinical Pharmacy Team for diabetes management. At last visit, we started patient on trulicity 0.75 mg and a CGM was started.     Referring Provider: Silvio Carr MD     HISTORY OF PRESENT ILLNESS  Approximate Date of Diagnosis: 2 years ago  Known complications due to diabetes included chronic kidney disease    Diet: patient states she is always snacking, she works at [employer] and is always around food  - 2 meals per day  - Breakfast: skips  - Lunch: crackers, chicken, shrimp, soup  - Dinner: hamburgers, pork chops, chicken, burger ruchi, applebees   - Drinks: water, no pop     Exercise Routine:   - walks around all day at work     LAB REVIEW   Glucose (mg/dL)   Date Value   08/17/2023 270 (H)   02/14/2023 160 (H)   05/25/2022 167 (H)     Hemoglobin A1C (%)   Date Value   08/17/2023 9.6 (A)   02/14/2023 8.0 (A)   12/06/2021 7.1 (A)     Bicarbonate (mmol/L)   Date Value   08/17/2023 27   02/14/2023 26   05/25/2022 28     Urea Nitrogen (mg/dL)   Date Value   08/17/2023 24 (H)   02/14/2023 19   05/25/2022 22     Creatinine (mg/dL)   Date Value   08/17/2023 1.30 (H)   02/14/2023 1.03   05/25/2022 1.16 (H)     Lab Results   Component Value Date    HGBA1C 9.6 (A) 08/17/2023    HGBA1C 8.0 (A) 02/14/2023    HGBA1C 7.1 (A) 12/06/2021     Lab Results   Component Value Date    CHOL 173 08/17/2023    CHOL 150 02/14/2023    CHOL 147 12/06/2021     Lab Results   Component Value Date    HDL 39.0 (A) 08/17/2023    HDL 42.3 02/14/2023    HDL 43.7 12/06/2021     No results found for: \"LDLCALC\"  Lab Results   Component Value Date    TRIG 167 (H) 08/17/2023    TRIG 120 02/14/2023    TRIG 105 12/06/2021       DIABETES ASSESSMENT    CURRENT PHARMACOTHERAPY  - trulicity 0.75 mg under the skin once weekly   - farxiga 10 mg once daily (pt has not yet started, this will start on 9/13 in her next pill packs)    SECONDARY PREVENTION  - " Statin? Yes  - ACE-I/ARB? No    HISTORICAL PHARMACOTHERAPY:   - lantus 8 units once daily (at night, maybe takes it 3-4 times per week)   - humalog 6 units three times daily (patient takes it if she has starch in a meal, or if she has a big meal; she takes it maybe 2 times per week)     SMBG MEASUREMENTS: patient just started testing her sugars     DISCUSSION:   Trulicity:   Patient is tolerating trulicity 0.75 mg under the skin once weekly.   She has completed 3 shots, today will be her 4th.   Discussed increasing the dose to 1.5 mg next week, patient agrees this is a good choice.   Farxiga: patient started this medication this week, she has not noticed any differences.   CGM: good job using your CGM to test your sugars throughout the day.  Diet:   Patient has worked hard on cutting out nighttime eating, since last speaking she has maybe only snacked once in the middle of the night.     RECOMMENDATIONS/PLAN  1. Patients diabetes is poorly controlled with most recent A1c of 9.6 % (goal < 7 %).   - Continue all meds under the continuation of care with the referring provider and clinical pharmacy team.  - Increase trulicity to 1.5 mg under the skin once weekly.     Clinical Pharmacist follow up: 2 weeks  Thank you,  Frieda Garcia, PharmD    Verbal consent to manage patient's drug therapy was obtained from the patient. They were informed they may decline to participate or withdraw from participation in pharmacy services at any time.

## 2023-09-27 DIAGNOSIS — K21.9 GASTROESOPHAGEAL REFLUX DISEASE, UNSPECIFIED WHETHER ESOPHAGITIS PRESENT: ICD-10-CM

## 2023-09-28 ENCOUNTER — TELEMEDICINE (OUTPATIENT)
Dept: PHARMACY | Facility: HOSPITAL | Age: 58
End: 2023-09-28
Payer: COMMERCIAL

## 2023-09-28 DIAGNOSIS — Z79.4 TYPE 2 DIABETES MELLITUS WITH OTHER SPECIFIED COMPLICATION, WITH LONG-TERM CURRENT USE OF INSULIN (MULTI): ICD-10-CM

## 2023-09-28 DIAGNOSIS — E11.69 TYPE 2 DIABETES MELLITUS WITH OTHER SPECIFIED COMPLICATION, WITH LONG-TERM CURRENT USE OF INSULIN (MULTI): ICD-10-CM

## 2023-09-28 DIAGNOSIS — E11.65 TYPE 2 DIABETES MELLITUS WITH HYPERGLYCEMIA, WITHOUT LONG-TERM CURRENT USE OF INSULIN (MULTI): ICD-10-CM

## 2023-09-28 RX ORDER — FLASH GLUCOSE SENSOR
KIT MISCELLANEOUS
Qty: 2 EACH | Refills: 5 | Status: SHIPPED | OUTPATIENT
Start: 2023-09-28 | End: 2023-11-27 | Stop reason: SDUPTHER

## 2023-09-28 RX ORDER — OMEPRAZOLE 20 MG/1
CAPSULE, DELAYED RELEASE ORAL
Qty: 90 CAPSULE | Refills: 1 | Status: SHIPPED | OUTPATIENT
Start: 2023-09-28 | End: 2024-03-26

## 2023-09-28 NOTE — PROGRESS NOTES
"Pharmacist Clinic: Diabetes Management  Linnette Rm is a 58 y.o. female was referred to Clinical Pharmacy Team for diabetes management. At last visit, trulicity was increased to 1.5 mg.     Referring Provider: Silvio Carr MD     HISTORY OF PRESENT ILLNESS  Approximate Date of Diagnosis: 2 years ago  Known complications due to diabetes included chronic kidney disease    Diet: patient states she is always snacking, she works at [employer] and is always around food  - 2 meals per day  - Breakfast: skips  - Lunch: crackers, chicken, shrimp, soup  - Dinner: hamburgers, pork chops, chicken, burger ruchi, applebees   - Drinks: water, no pop     Exercise Routine:   - walks around all day at work     LAB REVIEW   Glucose (mg/dL)   Date Value   08/17/2023 270 (H)   02/14/2023 160 (H)   05/25/2022 167 (H)     Hemoglobin A1C (%)   Date Value   08/17/2023 9.6 (A)   02/14/2023 8.0 (A)   12/06/2021 7.1 (A)     Bicarbonate (mmol/L)   Date Value   08/17/2023 27   02/14/2023 26   05/25/2022 28     Urea Nitrogen (mg/dL)   Date Value   08/17/2023 24 (H)   02/14/2023 19   05/25/2022 22     Creatinine (mg/dL)   Date Value   08/17/2023 1.30 (H)   02/14/2023 1.03   05/25/2022 1.16 (H)     Lab Results   Component Value Date    HGBA1C 9.6 (A) 08/17/2023    HGBA1C 8.0 (A) 02/14/2023    HGBA1C 7.1 (A) 12/06/2021     Lab Results   Component Value Date    CHOL 173 08/17/2023    CHOL 150 02/14/2023    CHOL 147 12/06/2021     Lab Results   Component Value Date    HDL 39.0 (A) 08/17/2023    HDL 42.3 02/14/2023    HDL 43.7 12/06/2021     No results found for: \"LDLCALC\"  Lab Results   Component Value Date    TRIG 167 (H) 08/17/2023    TRIG 120 02/14/2023    TRIG 105 12/06/2021       DIABETES ASSESSMENT    CURRENT PHARMACOTHERAPY  - trulicity 1.5 mg under the skin once weekly   - farxiga 10 mg once daily (pt has not yet started, this will start on 9/13 in her next pill packs)    SECONDARY PREVENTION  - Statin? Yes  - ACE-I/ARB? " No    HISTORICAL PHARMACOTHERAPY:   - lantus 8 units once daily (at night, maybe takes it 3-4 times per week)   - humalog 6 units three times daily (patient takes it if she has starch in a meal, or if she has a big meal; she takes it maybe 2 times per week)     SMBG MEASUREMENTS: 103, 134, 147,115, 116  - patient has had 3 low readings under 70, all correlating with no eating and corrected with a quick snack     DISCUSSION:   Trulicity: patient is tolerating trulicity 1.5 mg under the skin once weekly.   Patient has noticed she has started to lose weight.   Farxiga: patient is tolerating this medication without issue   CGM: good job using your CGM to test your sugars throughout the day.  Good job using the alarms to help you with high and low sugars   Diet:   Patient has cut out nighttime eating, she is very proud of herself.     RECOMMENDATIONS/PLAN  1. Patients diabetes is poorly controlled with most recent A1c of 9.6 % (goal < 7 %).   - Continue all meds under the continuation of care with the referring provider and clinical pharmacy team.    Clinical Pharmacist follow up: 2 weeks    Thank you,  Frieda Garcia, PharmD    Verbal consent to manage patient's drug therapy was obtained from the patient. They were informed they may decline to participate or withdraw from participation in pharmacy services at any time.

## 2023-10-06 DIAGNOSIS — E08.00 DIABETES MELLITUS DUE TO UNDERLYING CONDITION WITH HYPEROSMOLARITY WITHOUT COMA, WITH LONG-TERM CURRENT USE OF INSULIN (MULTI): Primary | ICD-10-CM

## 2023-10-06 DIAGNOSIS — Z79.4 DIABETES MELLITUS DUE TO UNDERLYING CONDITION WITH HYPEROSMOLARITY WITHOUT COMA, WITH LONG-TERM CURRENT USE OF INSULIN (MULTI): Primary | ICD-10-CM

## 2023-10-09 RX ORDER — INSULIN LISPRO 100 [IU]/ML
INJECTION, SOLUTION INTRAVENOUS; SUBCUTANEOUS
Qty: 15 ML | Refills: 3 | Status: SHIPPED | OUTPATIENT
Start: 2023-10-09 | End: 2023-10-12 | Stop reason: ALTCHOICE

## 2023-10-10 DIAGNOSIS — E11.65 TYPE 2 DIABETES MELLITUS WITH HYPERGLYCEMIA, WITHOUT LONG-TERM CURRENT USE OF INSULIN (MULTI): ICD-10-CM

## 2023-10-12 ENCOUNTER — TELEMEDICINE (OUTPATIENT)
Dept: PHARMACY | Facility: HOSPITAL | Age: 58
End: 2023-10-12
Payer: COMMERCIAL

## 2023-10-12 DIAGNOSIS — E11.65 TYPE 2 DIABETES MELLITUS WITH HYPERGLYCEMIA, WITHOUT LONG-TERM CURRENT USE OF INSULIN (MULTI): ICD-10-CM

## 2023-10-12 DIAGNOSIS — Z79.4 TYPE 2 DIABETES MELLITUS WITH OTHER SPECIFIED COMPLICATION, WITH LONG-TERM CURRENT USE OF INSULIN (MULTI): ICD-10-CM

## 2023-10-12 DIAGNOSIS — E11.69 TYPE 2 DIABETES MELLITUS WITH OTHER SPECIFIED COMPLICATION, WITH LONG-TERM CURRENT USE OF INSULIN (MULTI): ICD-10-CM

## 2023-10-12 PROBLEM — G47.9 SLEEP DISTURBANCE: Status: ACTIVE | Noted: 2023-09-07

## 2023-10-12 PROBLEM — F43.10 PTSD (POST-TRAUMATIC STRESS DISORDER): Status: ACTIVE | Noted: 2021-07-23

## 2023-10-12 PROBLEM — F14.21: Chronic | Status: ACTIVE | Noted: 2021-08-18

## 2023-10-12 PROBLEM — F14.20 COCAINE USE DISORDER, SEVERE, DEPENDENCE (MULTI): Chronic | Status: ACTIVE | Noted: 2021-07-23

## 2023-10-12 RX ORDER — VARENICLINE TARTRATE 0.5 (11)-1
0.5 KIT ORAL
COMMUNITY
Start: 2023-03-03 | End: 2024-05-23 | Stop reason: ALTCHOICE

## 2023-10-12 RX ORDER — ETODOLAC 400 MG/1
400 TABLET, FILM COATED ORAL 2 TIMES DAILY
COMMUNITY
Start: 2018-06-03

## 2023-10-12 RX ORDER — CLONIDINE HYDROCHLORIDE 0.1 MG/1
0.1 TABLET ORAL 3 TIMES DAILY
COMMUNITY
Start: 2009-02-06

## 2023-10-12 RX ORDER — DOXYCYCLINE 100 MG/1
100 CAPSULE ORAL 2 TIMES DAILY
COMMUNITY
Start: 2009-02-06 | End: 2023-11-17 | Stop reason: WASHOUT

## 2023-10-12 RX ORDER — PEN NEEDLE, DIABETIC 30 GX3/16"
NEEDLE, DISPOSABLE MISCELLANEOUS
COMMUNITY
End: 2023-11-06 | Stop reason: ALTCHOICE

## 2023-10-12 RX ORDER — PRAZOSIN HYDROCHLORIDE 1 MG/1
1 CAPSULE ORAL NIGHTLY
COMMUNITY
Start: 2023-09-26

## 2023-10-12 RX ORDER — BUDESONIDE AND FORMOTEROL FUMARATE DIHYDRATE 160; 4.5 UG/1; UG/1
2 AEROSOL RESPIRATORY (INHALATION) EVERY 12 HOURS
COMMUNITY
Start: 2023-09-26 | End: 2023-10-19

## 2023-10-12 RX ORDER — DULAGLUTIDE 1.5 MG/.5ML
1.5 INJECTION, SOLUTION SUBCUTANEOUS
Qty: 2 ML | Refills: 2 | Status: SHIPPED | OUTPATIENT
Start: 2023-10-12 | End: 2023-11-27 | Stop reason: SDUPTHER

## 2023-10-12 RX ORDER — LOSARTAN POTASSIUM 50 MG/1
50 TABLET ORAL DAILY
COMMUNITY

## 2023-10-12 RX ORDER — BLOOD-GLUCOSE CONTROL, NORMAL
EACH MISCELLANEOUS 2 TIMES DAILY
COMMUNITY
End: 2023-11-06 | Stop reason: ALTCHOICE

## 2023-10-12 RX ORDER — DAPAGLIFLOZIN 10 MG/1
10 TABLET, FILM COATED ORAL DAILY
Qty: 30 TABLET | Refills: 2 | Status: SHIPPED | OUTPATIENT
Start: 2023-10-12 | End: 2023-11-27 | Stop reason: SDUPTHER

## 2023-10-12 RX ORDER — DULAGLUTIDE 1.5 MG/.5ML
1.5 INJECTION, SOLUTION SUBCUTANEOUS
Qty: 2 ML | Refills: 10 | OUTPATIENT
Start: 2023-10-12

## 2023-10-12 NOTE — PROGRESS NOTES
"Pharmacist Clinic: Diabetes Management  Linnette Rm is a 58 y.o. female was referred to Clinical Pharmacy Team for diabetes management. At last visit, trulicity was increased to 1.5 mg.     Referring Provider: Silvio Carr MD     HISTORY OF PRESENT ILLNESS  Approximate Date of Diagnosis: 2 years ago  Known complications due to diabetes included chronic kidney disease    Diet: patient states she is always snacking, she works at [employer] and is always around food  - 2 meals per day  - Breakfast: skips  - Lunch: crackers, chicken, shrimp, soup  - Dinner: hamburgers, pork chops, chicken, burger ruchi, applebees   - Drinks: water, no pop     Exercise Routine:   - walks around all day at work     LAB REVIEW   Glucose (mg/dL)   Date Value   08/17/2023 270 (H)   02/14/2023 160 (H)   05/25/2022 167 (H)     Hemoglobin A1C (%)   Date Value   08/17/2023 9.6 (A)   02/14/2023 8.0 (A)   12/06/2021 7.1 (A)     Bicarbonate (mmol/L)   Date Value   08/17/2023 27   02/14/2023 26   05/25/2022 28     Urea Nitrogen (mg/dL)   Date Value   08/17/2023 24 (H)   02/14/2023 19   05/25/2022 22     Creatinine (mg/dL)   Date Value   08/17/2023 1.30 (H)   02/14/2023 1.03   05/25/2022 1.16 (H)     Lab Results   Component Value Date    HGBA1C 9.6 (A) 08/17/2023    HGBA1C 8.0 (A) 02/14/2023    HGBA1C 7.1 (A) 12/06/2021     Lab Results   Component Value Date    CHOL 173 08/17/2023    CHOL 150 02/14/2023    CHOL 147 12/06/2021     Lab Results   Component Value Date    HDL 39.0 (A) 08/17/2023    HDL 42.3 02/14/2023    HDL 43.7 12/06/2021     No results found for: \"LDLCALC\"  Lab Results   Component Value Date    TRIG 167 (H) 08/17/2023    TRIG 120 02/14/2023    TRIG 105 12/06/2021       DIABETES ASSESSMENT    CURRENT PHARMACOTHERAPY  - trulicity 1.5 mg under the skin once weekly   - farxiga 10 mg once daily (pt has not yet started, this will start on 9/13 in her next pill packs)    SECONDARY PREVENTION  - Statin? Yes  - ACE-I/ARB? " No    HISTORICAL PHARMACOTHERAPY:   - lantus 8 units once daily (at night, maybe takes it 3-4 times per week)   - humalog 6 units three times daily (patient takes it if she has starch in a meal, or if she has a big meal; she takes it maybe 2 times per week)     SMBG MEASUREMENTS: patient has not seen a reading over 140 since we last spoke     DISCUSSION:   Trulicity: patient is tolerating trulicity 1.5 mg under the skin once weekly.   Patient has noticed she has started to lose weight.   Her appetite is down.  Farxiga: patient is tolerating this medication without issue   CGM: good job using your CGM to test your sugars throughout the day.  Good job using the alarms to help you with high and low sugars   Diet:   Patient has cut out nighttime eating, she is very proud of herself.     RECOMMENDATIONS/PLAN  1. Patients diabetes is poorly controlled with most recent A1c of 9.6 % (goal < 7 %).   - Continue all meds under the continuation of care with the referring provider and clinical pharmacy team.    Clinical Pharmacist follow up: 1 month   PCP Follow up: 11/21    Thank you,  Frieda Garcia, PharmD    Verbal consent to manage patient's drug therapy was obtained from the patient. They were informed they may decline to participate or withdraw from participation in pharmacy services at any time.

## 2023-10-18 DIAGNOSIS — Z79.4 TYPE 2 DIABETES MELLITUS WITHOUT COMPLICATION, WITH LONG-TERM CURRENT USE OF INSULIN (MULTI): Primary | ICD-10-CM

## 2023-10-18 DIAGNOSIS — E11.9 TYPE 2 DIABETES MELLITUS WITHOUT COMPLICATION, WITH LONG-TERM CURRENT USE OF INSULIN (MULTI): Primary | ICD-10-CM

## 2023-10-18 DIAGNOSIS — J43.9 PULMONARY EMPHYSEMA, UNSPECIFIED EMPHYSEMA TYPE (MULTI): Primary | ICD-10-CM

## 2023-10-19 RX ORDER — INSULIN LISPRO 100 [IU]/ML
INJECTION, SOLUTION INTRAVENOUS; SUBCUTANEOUS
Qty: 15 ML | Refills: 10 | Status: SHIPPED | OUTPATIENT
Start: 2023-10-19 | End: 2023-11-06 | Stop reason: ALTCHOICE

## 2023-10-19 RX ORDER — BUDESONIDE AND FORMOTEROL FUMARATE DIHYDRATE 160; 4.5 UG/1; UG/1
2 AEROSOL RESPIRATORY (INHALATION) EVERY 12 HOURS
Qty: 10.2 G | Refills: 5 | Status: SHIPPED | OUTPATIENT
Start: 2023-10-19 | End: 2024-02-22 | Stop reason: WASHOUT

## 2023-10-25 DIAGNOSIS — I10 PRIMARY HYPERTENSION: ICD-10-CM

## 2023-10-26 RX ORDER — METOPROLOL SUCCINATE 50 MG/1
50 TABLET, EXTENDED RELEASE ORAL DAILY
Qty: 90 TABLET | Refills: 0 | Status: SHIPPED | OUTPATIENT
Start: 2023-10-26 | End: 2024-01-29

## 2023-11-06 ENCOUNTER — TELEMEDICINE (OUTPATIENT)
Dept: PHARMACY | Facility: HOSPITAL | Age: 58
End: 2023-11-06
Payer: COMMERCIAL

## 2023-11-06 DIAGNOSIS — E11.65 TYPE 2 DIABETES MELLITUS WITH HYPERGLYCEMIA, WITHOUT LONG-TERM CURRENT USE OF INSULIN (MULTI): Primary | ICD-10-CM

## 2023-11-06 NOTE — PROGRESS NOTES
"Pharmacist Clinic: Diabetes Management  Linnette Rm is a 58 y.o. female was referred to Clinical Pharmacy Team for diabetes management. At last visit, trulicity was increased to 1.5 mg.     Referring Provider: Silvio Carr MD     HISTORY OF PRESENT ILLNESS  Approximate Date of Diagnosis: 2 years ago  Known complications due to diabetes included chronic kidney disease    Diet: patient states she is always snacking, she works at "[employer] and is always around food  - 2 meals per day  - Breakfast: skips  - Lunch: crackers, chicken, shrimp, soup  - Dinner: hamburgers, pork chops, chicken, burger ruchi, applebees   - Drinks: water, no pop     Exercise Routine:   - walks around all day at work     LAB REVIEW   Glucose (mg/dL)   Date Value   08/17/2023 270 (H)   02/14/2023 160 (H)   05/25/2022 167 (H)     Hemoglobin A1C (%)   Date Value   08/17/2023 9.6 (A)   02/14/2023 8.0 (A)   12/06/2021 7.1 (A)     Bicarbonate (mmol/L)   Date Value   08/17/2023 27   02/14/2023 26   05/25/2022 28     Urea Nitrogen (mg/dL)   Date Value   08/17/2023 24 (H)   02/14/2023 19   05/25/2022 22     Creatinine (mg/dL)   Date Value   08/17/2023 1.30 (H)   02/14/2023 1.03   05/25/2022 1.16 (H)     Lab Results   Component Value Date    HGBA1C 9.6 (A) 08/17/2023    HGBA1C 8.0 (A) 02/14/2023    HGBA1C 7.1 (A) 12/06/2021     Lab Results   Component Value Date    CHOL 173 08/17/2023    CHOL 150 02/14/2023    CHOL 147 12/06/2021     Lab Results   Component Value Date    HDL 39.0 (A) 08/17/2023    HDL 42.3 02/14/2023    HDL 43.7 12/06/2021     No results found for: \"LDLCALC\"  Lab Results   Component Value Date    TRIG 167 (H) 08/17/2023    TRIG 120 02/14/2023    TRIG 105 12/06/2021       DIABETES ASSESSMENT    CURRENT PHARMACOTHERAPY  - trulicity 1.5 mg under the skin once weekly   - farxiga 10 mg once daily (pt has not yet started, this will start on 9/13 in her next pill packs)    SECONDARY PREVENTION  - Statin? Yes  - ACE-I/ARB? " No    HISTORICAL PHARMACOTHERAPY:   - lantus 8 units once daily (at night, maybe takes it 3-4 times per week)   - humalog 6 units three times daily (patient takes it if she has starch in a meal, or if she has a big meal; she takes it maybe 2 times per week)     SMBG MEASUREMENTS: patient has not seen a reading over 180 since we last spoke     DISCUSSION:   Trulicity: patient is tolerating trulicity 1.5 mg under the skin once weekly. She denies any side effects.   Farxiga: patient is tolerating this medication without issue. She denies any side effects.  CGM: good job using your CGM to test your sugars throughout the day.  Good job using the alarms to help you with high and low sugars     RECOMMENDATIONS/PLAN  1. Patients diabetes is poorly controlled with most recent A1c of 9.6 % (goal < 7 %).   - Continue all meds under the continuation of care with the referring provider and clinical pharmacy team.    Clinical Pharmacist follow up: 1 month   PCP Follow up: 11/21    Thank you,  Frieda Garcia, PharmD    Verbal consent to manage patient's drug therapy was obtained from the patient. They were informed they may decline to participate or withdraw from participation in pharmacy services at any time.

## 2023-11-14 ASSESSMENT — ENCOUNTER SYMPTOMS
HEADACHES: 1
SEIZURES: 0
FATIGUE: 0
BLURRED VISION: 1
SPEECH DIFFICULTY: 0
DIZZINESS: 0
VISUAL CHANGE: 1
BLACKOUTS: 0
TREMORS: 0
WEAKNESS: 0
POLYDIPSIA: 1
NERVOUS/ANXIOUS: 0
SWEATS: 0
HUNGER: 0
POLYPHAGIA: 0
CONFUSION: 0
WEIGHT LOSS: 0

## 2023-11-21 ENCOUNTER — LAB (OUTPATIENT)
Dept: LAB | Facility: LAB | Age: 58
End: 2023-11-21
Payer: COMMERCIAL

## 2023-11-21 ENCOUNTER — OFFICE VISIT (OUTPATIENT)
Dept: PRIMARY CARE | Facility: CLINIC | Age: 58
End: 2023-11-21
Payer: COMMERCIAL

## 2023-11-21 VITALS
BODY MASS INDEX: 36.15 KG/M2 | SYSTOLIC BLOOD PRESSURE: 136 MMHG | OXYGEN SATURATION: 95 % | HEART RATE: 70 BPM | HEIGHT: 65 IN | DIASTOLIC BLOOD PRESSURE: 80 MMHG | WEIGHT: 217 LBS | RESPIRATION RATE: 18 BRPM

## 2023-11-21 DIAGNOSIS — E08.00 DIABETES MELLITUS DUE TO UNDERLYING CONDITION WITH HYPEROSMOLARITY WITHOUT COMA, WITHOUT LONG-TERM CURRENT USE OF INSULIN (MULTI): ICD-10-CM

## 2023-11-21 DIAGNOSIS — N18.2 CKD (CHRONIC KIDNEY DISEASE) STAGE 2, GFR 60-89 ML/MIN: ICD-10-CM

## 2023-11-21 DIAGNOSIS — N18.2 CKD (CHRONIC KIDNEY DISEASE) STAGE 2, GFR 60-89 ML/MIN: Primary | ICD-10-CM

## 2023-11-21 DIAGNOSIS — F17.200 NICOTINE DEPENDENCE, UNCOMPLICATED, UNSPECIFIED NICOTINE PRODUCT TYPE: ICD-10-CM

## 2023-11-21 PROBLEM — E11.9 TYPE 2 DIABETES MELLITUS WITHOUT COMPLICATIONS (MULTI): Status: RESOLVED | Noted: 2023-08-15 | Resolved: 2023-11-21

## 2023-11-21 LAB
ALBUMIN SERPL BCP-MCNC: 4.4 G/DL (ref 3.4–5)
ALP SERPL-CCNC: 103 U/L (ref 33–110)
ALT SERPL W P-5'-P-CCNC: 19 U/L (ref 7–45)
ANION GAP SERPL CALC-SCNC: 14 MMOL/L (ref 10–20)
AST SERPL W P-5'-P-CCNC: 20 U/L (ref 9–39)
BILIRUB SERPL-MCNC: 0.4 MG/DL (ref 0–1.2)
BUN SERPL-MCNC: 22 MG/DL (ref 6–23)
CALCIUM SERPL-MCNC: 9.7 MG/DL (ref 8.6–10.6)
CHLORIDE SERPL-SCNC: 106 MMOL/L (ref 98–107)
CO2 SERPL-SCNC: 28 MMOL/L (ref 21–32)
CREAT SERPL-MCNC: 1.16 MG/DL (ref 0.5–1.05)
EST. AVERAGE GLUCOSE BLD GHB EST-MCNC: 140 MG/DL
GFR SERPL CREATININE-BSD FRML MDRD: 55 ML/MIN/1.73M*2
GLUCOSE SERPL-MCNC: 104 MG/DL (ref 74–99)
HBA1C MFR BLD: 6.5 %
POTASSIUM SERPL-SCNC: 4.6 MMOL/L (ref 3.5–5.3)
PROT SERPL-MCNC: 7.7 G/DL (ref 6.4–8.2)
SODIUM SERPL-SCNC: 143 MMOL/L (ref 136–145)

## 2023-11-21 PROCEDURE — 3075F SYST BP GE 130 - 139MM HG: CPT | Performed by: INTERNAL MEDICINE

## 2023-11-21 PROCEDURE — 80053 COMPREHEN METABOLIC PANEL: CPT

## 2023-11-21 PROCEDURE — 99214 OFFICE O/P EST MOD 30 MIN: CPT | Performed by: INTERNAL MEDICINE

## 2023-11-21 PROCEDURE — 3046F HEMOGLOBIN A1C LEVEL >9.0%: CPT | Performed by: INTERNAL MEDICINE

## 2023-11-21 PROCEDURE — 3079F DIAST BP 80-89 MM HG: CPT | Performed by: INTERNAL MEDICINE

## 2023-11-21 PROCEDURE — 4004F PT TOBACCO SCREEN RCVD TLK: CPT | Performed by: INTERNAL MEDICINE

## 2023-11-21 PROCEDURE — 83036 HEMOGLOBIN GLYCOSYLATED A1C: CPT

## 2023-11-21 PROCEDURE — 4010F ACE/ARB THERAPY RXD/TAKEN: CPT | Performed by: INTERNAL MEDICINE

## 2023-11-21 PROCEDURE — 36415 COLL VENOUS BLD VENIPUNCTURE: CPT

## 2023-11-21 RX ORDER — VARENICLINE TARTRATE 1 MG/1
1 TABLET, FILM COATED ORAL 2 TIMES DAILY
Qty: 60 TABLET | Refills: 2 | Status: SHIPPED | OUTPATIENT
Start: 2023-11-21 | End: 2024-01-29

## 2023-11-21 ASSESSMENT — ENCOUNTER SYMPTOMS
BACK PAIN: 0
SINUS PRESSURE: 0
CHILLS: 0
NUMBNESS: 0
BLOOD IN STOOL: 0
ABDOMINAL DISTENTION: 0
SORE THROAT: 0
ABDOMINAL PAIN: 0
DIAPHORESIS: 0
RHINORRHEA: 0
DIFFICULTY URINATING: 0
HEMATURIA: 0
NAUSEA: 0
SHORTNESS OF BREATH: 0
DIZZINESS: 0
MYALGIAS: 0
WHEEZING: 0
NECK PAIN: 0
PALPITATIONS: 0
ARTHRALGIAS: 0
FEVER: 0
APPETITE CHANGE: 0
WEAKNESS: 0
VOMITING: 0
DYSURIA: 0
NECK STIFFNESS: 0
HEADACHES: 0
DIARRHEA: 0
FATIGUE: 0
JOINT SWELLING: 0
COUGH: 0
FREQUENCY: 0
LIGHT-HEADEDNESS: 0
CONSTIPATION: 0

## 2023-11-21 NOTE — PROGRESS NOTES
"Subjective   Patient ID: Linnette Rm is a 58 y.o. female who presents for Follow-up.    HPI   She presents for follow-up.     Review of Systems   Constitutional:  Negative for appetite change, chills, diaphoresis, fatigue and fever.   HENT:  Negative for congestion, ear discharge, ear pain, hearing loss, postnasal drip, rhinorrhea, sinus pressure, sore throat and tinnitus.    Eyes:  Negative for visual disturbance.   Respiratory:  Negative for cough, shortness of breath and wheezing.    Cardiovascular:  Negative for chest pain, palpitations and leg swelling.   Gastrointestinal:  Negative for abdominal distention, abdominal pain, blood in stool, constipation, diarrhea, nausea and vomiting.   Genitourinary:  Negative for decreased urine volume, difficulty urinating, dysuria, frequency, hematuria and urgency.   Musculoskeletal:  Negative for arthralgias, back pain, gait problem, joint swelling, myalgias, neck pain and neck stiffness.   Skin:  Negative for rash.   Neurological:  Negative for dizziness, weakness, light-headedness, numbness and headaches.       Objective   /80   Pulse 70   Resp 18   Ht 1.651 m (5' 5\")   Wt 98.4 kg (217 lb)   SpO2 95%   BMI 36.11 kg/m²     Physical Exam  Vitals reviewed.   Constitutional:       Appearance: Normal appearance. She is normal weight.   HENT:      Right Ear: Tympanic membrane and external ear normal.      Left Ear: Tympanic membrane and external ear normal.   Eyes:      Extraocular Movements: Extraocular movements intact.      Conjunctiva/sclera: Conjunctivae normal.      Pupils: Pupils are equal, round, and reactive to light.   Cardiovascular:      Rate and Rhythm: Normal rate and regular rhythm.      Pulses: Normal pulses.   Pulmonary:      Effort: Pulmonary effort is normal.      Breath sounds: Normal breath sounds.   Abdominal:      General: Bowel sounds are normal.      Palpations: Abdomen is soft.   Musculoskeletal:         General: Normal range of " motion.      Cervical back: Normal range of motion.   Skin:     General: Skin is warm and dry.   Neurological:      General: No focal deficit present.      Mental Status: She is oriented to person, place, and time.   Psychiatric:         Mood and Affect: Mood normal.         Behavior: Behavior normal.         Assessment/Plan   Problem List Items Addressed This Visit             ICD-10-CM    Diabetes (CMS/[facility]) E11.9     - Repeat A1C   - Currently on lispro 8 U TID and lantus 10 U at bedtime   -Currently on Trulicity 1.5 mg weekly   -Goal is to DC insulin if blood sugars get under control with a GLP1 agonist and biguanide/SGLT2 inhibitor per insurance coverage   Can also consider adding farxiga for nephroprotective effect   I recommend seeing ophthalmology and podiatry for checkups         Relevant Orders    Hemoglobin A1C    Comprehensive Metabolic Panel    CKD (chronic kidney disease) stage 2, GFR 60-89 ml/min - Primary N18.2     -Repeat CMP  -Medication list reviewed  -Counselled on low sodium diet and need to keep blood pressures well controlled  -Educated on avoidance of toxic medications such as NSAIDs (ibuprofen, Aleve, Motrin, diclofenac, Advil)  -Drink adequate quantities of water to remain hydrated.          Relevant Orders    Comprehensive Metabolic Panel    Nicotine dependence, unspecified, uncomplicated F17.200     She resumed smoking due to stressful life situation  Resume chantix          Relevant Medications    varenicline (Chantix) 1 mg tablet   RTC in 3 mo

## 2023-11-21 NOTE — ASSESSMENT & PLAN NOTE
- Repeat A1C   - Currently on lispro 8 U TID and lantus 10 U at bedtime   -Currently on Trulicity 1.5 mg weekly   -Goal is to DC insulin if blood sugars get under control with a GLP1 agonist and biguanide/SGLT2 inhibitor per insurance coverage   Can also consider adding farxiga for nephroprotective effect   I recommend seeing ophthalmology and podiatry for checkups

## 2023-11-21 NOTE — ASSESSMENT & PLAN NOTE
-Repeat CMP  -Medication list reviewed  -Counselled on low sodium diet and need to keep blood pressures well controlled  -Educated on avoidance of toxic medications such as NSAIDs (ibuprofen, Aleve, Motrin, diclofenac, Advil)  -Drink adequate quantities of water to remain hydrated.

## 2023-11-27 ENCOUNTER — TELEMEDICINE (OUTPATIENT)
Dept: PHARMACY | Facility: HOSPITAL | Age: 58
End: 2023-11-27
Payer: COMMERCIAL

## 2023-11-27 DIAGNOSIS — Z79.4 TYPE 2 DIABETES MELLITUS WITH OTHER SPECIFIED COMPLICATION, WITH LONG-TERM CURRENT USE OF INSULIN (MULTI): ICD-10-CM

## 2023-11-27 DIAGNOSIS — E11.65 TYPE 2 DIABETES MELLITUS WITH HYPERGLYCEMIA, WITHOUT LONG-TERM CURRENT USE OF INSULIN (MULTI): ICD-10-CM

## 2023-11-27 DIAGNOSIS — E11.69 TYPE 2 DIABETES MELLITUS WITH OTHER SPECIFIED COMPLICATION, WITH LONG-TERM CURRENT USE OF INSULIN (MULTI): ICD-10-CM

## 2023-11-27 RX ORDER — FLASH GLUCOSE SENSOR
KIT MISCELLANEOUS
Qty: 6 EACH | Refills: 1 | Status: SHIPPED | OUTPATIENT
Start: 2023-11-27 | End: 2024-03-26

## 2023-11-27 RX ORDER — DULAGLUTIDE 1.5 MG/.5ML
1.5 INJECTION, SOLUTION SUBCUTANEOUS
Qty: 6 ML | Refills: 1 | Status: SHIPPED | OUTPATIENT
Start: 2023-11-27 | End: 2024-02-20 | Stop reason: DRUGHIGH

## 2023-11-27 RX ORDER — DAPAGLIFLOZIN 10 MG/1
10 TABLET, FILM COATED ORAL DAILY
Qty: 90 TABLET | Refills: 1 | Status: SHIPPED | OUTPATIENT
Start: 2023-11-27 | End: 2024-05-16 | Stop reason: SDUPTHER

## 2023-11-27 NOTE — PROGRESS NOTES
"Pharmacist Clinic: Diabetes Management  Linnette Rm is a 58 y.o. female was referred to Clinical Pharmacy Team for diabetes management. Since last visit, patient saw her PCP and lab work was done.     Referring Provider: Silvio Carr MD     HISTORY OF PRESENT ILLNESS  Approximate Date of Diagnosis: 2 years ago  Known complications due to diabetes included chronic kidney disease    Diet: patient states she is always snacking, she works at [employer] and is always around food  - 2 meals per day  - Breakfast: skips  - Lunch: crackers, chicken, shrimp, soup  - Dinner: hamburgers, pork chops, chicken, burger ruchi, applebees   - Drinks: water, no pop     Exercise Routine:   - walks around all day at work     LAB REVIEW   Glucose (mg/dL)   Date Value   11/21/2023 104 (H)   08/17/2023 270 (H)   02/14/2023 160 (H)   05/25/2022 167 (H)     Hemoglobin A1C (%)   Date Value   11/21/2023 6.5 (H)   08/17/2023 9.6 (A)   02/14/2023 8.0 (A)   12/06/2021 7.1 (A)     Bicarbonate (mmol/L)   Date Value   11/21/2023 28   08/17/2023 27   02/14/2023 26   05/25/2022 28     Urea Nitrogen (mg/dL)   Date Value   11/21/2023 22   08/17/2023 24 (H)   02/14/2023 19   05/25/2022 22     Creatinine (mg/dL)   Date Value   11/21/2023 1.16 (H)   08/17/2023 1.30 (H)   02/14/2023 1.03   05/25/2022 1.16 (H)     Lab Results   Component Value Date    HGBA1C 6.5 (H) 11/21/2023    HGBA1C 9.6 (A) 08/17/2023    HGBA1C 8.0 (A) 02/14/2023     Lab Results   Component Value Date    CHOL 173 08/17/2023    CHOL 150 02/14/2023    CHOL 147 12/06/2021     Lab Results   Component Value Date    HDL 39.0 (A) 08/17/2023    HDL 42.3 02/14/2023    HDL 43.7 12/06/2021     No results found for: \"LDLCALC\"  Lab Results   Component Value Date    TRIG 167 (H) 08/17/2023    TRIG 120 02/14/2023    TRIG 105 12/06/2021       DIABETES ASSESSMENT    CURRENT PHARMACOTHERAPY  - trulicity 1.5 mg under the skin once weekly   - farxiga 10 mg once daily     SECONDARY " PREVENTION  - Statin? Yes  - ACE-I/ARB? No    HISTORICAL PHARMACOTHERAPY:   - lantus 8 units once daily (at night, maybe takes it 3-4 times per week)   - humalog 6 units three times daily (patient takes it if she has starch in a meal, or if she has a big meal; she takes it maybe 2 times per week)     SMBG MEASUREMENTS: patient has not seen a reading over 180 since we last spoke     DISCUSSION:   Discussed lab work, last A1c came back at 6.5%, this is below goal of 7%  Trulicity: patient is tolerating trulicity 1.5 mg under the skin once weekly. She denies any side effects.   Farxiga: patient is tolerating this medication without issue. She denies any side effects.  CGM: good job using your CGM to test your sugars throughout the day.  Good job using the alarms to help you with high and low sugars     RECOMMENDATIONS/PLAN  1. Patients diabetes is well controlled with most recent A1c of 6.5 % (goal < 7 %).   - Continue all meds under the continuation of care with the referring provider and clinical pharmacy team.    Clinical Pharmacist follow up: 3 months     Thank you,  Frieda Garcia, PharmD    Verbal consent to manage patient's drug therapy was obtained from the patient. They were informed they may decline to participate or withdraw from participation in pharmacy services at any time.

## 2023-11-29 ENCOUNTER — TELEPHONE (OUTPATIENT)
Dept: PRIMARY CARE | Facility: CLINIC | Age: 58
End: 2023-11-29
Payer: COMMERCIAL

## 2023-11-29 NOTE — TELEPHONE ENCOUNTER
Advise pt of lab results. Pt expressed understanding.    ----- Message from Silvio Carr MD sent at 11/29/2023 10:43 AM EST -----  Blood sugars have improved significantly since starting Trulicity (currently on 1.5 mg weekly). Renal function has also improved. Continue current treatment.

## 2023-11-29 NOTE — RESULT ENCOUNTER NOTE
Blood sugars have improved significantly since starting Trulicity (currently on 1.5 mg weekly). Renal function has also improved. Continue current treatment.

## 2023-12-14 DIAGNOSIS — Z12.11 COLON CANCER SCREENING: ICD-10-CM

## 2023-12-14 RX ORDER — POLYETHYLENE GLYCOL 3350, SODIUM SULFATE ANHYDROUS, SODIUM BICARBONATE, SODIUM CHLORIDE, POTASSIUM CHLORIDE 236; 22.74; 6.74; 5.86; 2.97 G/4L; G/4L; G/4L; G/4L; G/4L
4000 POWDER, FOR SOLUTION ORAL ONCE
Qty: 4000 ML | Refills: 0 | Status: SHIPPED | OUTPATIENT
Start: 2023-12-14 | End: 2023-12-14

## 2024-01-24 DIAGNOSIS — F17.200 NICOTINE DEPENDENCE, UNCOMPLICATED, UNSPECIFIED NICOTINE PRODUCT TYPE: ICD-10-CM

## 2024-01-24 DIAGNOSIS — Z79.4 TYPE 2 DIABETES MELLITUS WITH OTHER SPECIFIED COMPLICATION, WITH LONG-TERM CURRENT USE OF INSULIN (MULTI): ICD-10-CM

## 2024-01-24 DIAGNOSIS — E11.69 TYPE 2 DIABETES MELLITUS WITH OTHER SPECIFIED COMPLICATION, WITH LONG-TERM CURRENT USE OF INSULIN (MULTI): ICD-10-CM

## 2024-01-24 DIAGNOSIS — I10 PRIMARY HYPERTENSION: ICD-10-CM

## 2024-01-25 ENCOUNTER — OFFICE VISIT (OUTPATIENT)
Dept: OPHTHALMOLOGY | Facility: CLINIC | Age: 59
End: 2024-01-25
Payer: COMMERCIAL

## 2024-01-25 DIAGNOSIS — E11.9 TYPE 2 DIABETES MELLITUS WITHOUT RETINOPATHY (MULTI): Primary | ICD-10-CM

## 2024-01-25 DIAGNOSIS — H52.4 PRESBYOPIA OF BOTH EYES: ICD-10-CM

## 2024-01-25 DIAGNOSIS — H52.03 HYPERMETROPIA OF BOTH EYES: ICD-10-CM

## 2024-01-25 PROCEDURE — 92015 DETERMINE REFRACTIVE STATE: CPT | Performed by: STUDENT IN AN ORGANIZED HEALTH CARE EDUCATION/TRAINING PROGRAM

## 2024-01-25 PROCEDURE — 92014 COMPRE OPH EXAM EST PT 1/>: CPT | Performed by: STUDENT IN AN ORGANIZED HEALTH CARE EDUCATION/TRAINING PROGRAM

## 2024-01-25 ASSESSMENT — REFRACTION_MANIFEST
OS_CYLINDER: +0.50
OS_SPHERE: +0.50
OS_ADD: +2.25
OD_SPHERE: +0.75
OD_AXIS: 010
OD_CYLINDER: +0.75
OD_ADD: +2.25
OS_AXIS: 170

## 2024-01-25 ASSESSMENT — SLIT LAMP EXAM - LIDS
COMMENTS: GOOD POSITION, MGD
COMMENTS: GOOD POSITION, MGD

## 2024-01-25 ASSESSMENT — ENCOUNTER SYMPTOMS
PSYCHIATRIC NEGATIVE: 0
RESPIRATORY NEGATIVE: 0
GASTROINTESTINAL NEGATIVE: 0
CARDIOVASCULAR NEGATIVE: 1
NEUROLOGICAL NEGATIVE: 0
ENDOCRINE NEGATIVE: 1
ALLERGIC/IMMUNOLOGIC NEGATIVE: 0
MUSCULOSKELETAL NEGATIVE: 0
EYES NEGATIVE: 0
CONSTITUTIONAL NEGATIVE: 0
HEMATOLOGIC/LYMPHATIC NEGATIVE: 0
ENDOCRINE COMMENTS: TYPE II DM

## 2024-01-25 ASSESSMENT — CONF VISUAL FIELD
OS_SUPERIOR_NASAL_RESTRICTION: 0
OS_NORMAL: 1
OS_INFERIOR_NASAL_RESTRICTION: 0
METHOD: COUNTING FINGERS
OD_SUPERIOR_TEMPORAL_RESTRICTION: 0
OD_INFERIOR_NASAL_RESTRICTION: 0
OD_SUPERIOR_NASAL_RESTRICTION: 0
OS_INFERIOR_TEMPORAL_RESTRICTION: 0
OD_NORMAL: 1
OD_INFERIOR_TEMPORAL_RESTRICTION: 0
OS_SUPERIOR_TEMPORAL_RESTRICTION: 0

## 2024-01-25 ASSESSMENT — TONOMETRY
OD_IOP_MMHG: 12
IOP_METHOD: GOLDMANN APPLANATION
OS_IOP_MMHG: 14

## 2024-01-25 ASSESSMENT — VISUAL ACUITY
CORRECTION_TYPE: GLASSES
OS_CC: 20/25
OD_CC: 20/25
METHOD: SNELLEN - LINEAR

## 2024-01-25 ASSESSMENT — REFRACTION_WEARINGRX
OS_AXIS: 170
OS_ADD: +2.00
OD_SPHERE: +0.25
OD_CYLINDER: +0.75
OS_CYLINDER: +0.50
OD_ADD: +2.00
OS_SPHERE: +0.50
OD_AXIS: 010

## 2024-01-25 ASSESSMENT — CUP TO DISC RATIO
OD_RATIO: .3
OS_RATIO: .3

## 2024-01-25 ASSESSMENT — EXTERNAL EXAM - LEFT EYE: OS_EXAM: NORMAL

## 2024-01-25 ASSESSMENT — EXTERNAL EXAM - RIGHT EYE: OD_EXAM: NORMAL

## 2024-01-25 NOTE — PROGRESS NOTES
Assessment/Plan   Diagnoses and all orders for this visit:  Type 2 diabetes mellitus without retinopathy (CMS/[facility])  -no retinopathy observed on exam today od/os, pt ed to continue good BGlc, blood pressure and lipid control, rtc with any changes in vision, otherwise monitor 1 year  Hypermetropia of both eyes  Presbyopia of both eyes  -New spec rx released today per patient request. Ocular health wnl for age OU. Monitor 1 year or sooner prn. Refraction billed today.  -Small changes to MRX with good BCVA 20/20 OD+OS    RTC 1 year for annual with DFE and MRX

## 2024-01-29 RX ORDER — METOPROLOL SUCCINATE 50 MG/1
50 TABLET, EXTENDED RELEASE ORAL DAILY
Qty: 30 TABLET | Refills: 2 | Status: SHIPPED | OUTPATIENT
Start: 2024-01-29 | End: 2024-04-25

## 2024-01-29 RX ORDER — VARENICLINE TARTRATE 1 MG/1
1 TABLET, FILM COATED ORAL 2 TIMES DAILY
Qty: 60 TABLET | Refills: 2 | Status: SHIPPED | OUTPATIENT
Start: 2024-01-29 | End: 2024-04-25

## 2024-01-29 RX ORDER — BLOOD SUGAR DIAGNOSTIC
STRIP MISCELLANEOUS
Qty: 100 STRIP | Refills: 1 | Status: SHIPPED | OUTPATIENT
Start: 2024-01-29 | End: 2024-03-26

## 2024-02-05 ENCOUNTER — APPOINTMENT (OUTPATIENT)
Dept: GASTROENTEROLOGY | Facility: EXTERNAL LOCATION | Age: 59
End: 2024-02-05
Payer: COMMERCIAL

## 2024-02-06 ENCOUNTER — HOSPITAL ENCOUNTER (OUTPATIENT)
Dept: RADIOLOGY | Facility: HOSPITAL | Age: 59
Discharge: HOME | End: 2024-02-06
Payer: COMMERCIAL

## 2024-02-06 DIAGNOSIS — R92.8 OTHER ABNORMAL AND INCONCLUSIVE FINDINGS ON DIAGNOSTIC IMAGING OF BREAST: ICD-10-CM

## 2024-02-06 PROCEDURE — 76642 ULTRASOUND BREAST LIMITED: CPT | Mod: LT,59

## 2024-02-06 PROCEDURE — 76642 ULTRASOUND BREAST LIMITED: CPT | Mod: LEFT SIDE | Performed by: RADIOLOGY

## 2024-02-06 PROCEDURE — 76981 USE PARENCHYMA: CPT | Mod: LT

## 2024-02-06 NOTE — LETTER
February 8, 2024     Linnette Rm  [address] [address]      Dear Ms. Hughes Rm:    Below are the results from your recent visit:    Your mammogram revealed some changes in the left breast tissue which need further investigation. These changes are likely benign   (non-cancerous) we will monitor with repeat left  breast ultrasound in 6 mo. The has been placed please call to schedule for 6 months from now.           If you have any questions or concerns, please don't hesitate to call.

## 2024-02-07 DIAGNOSIS — R92.8 ABNORMAL MAMMOGRAM: Primary | ICD-10-CM

## 2024-02-08 NOTE — RESULT ENCOUNTER NOTE
Your mammogram revealed some changes in the left breast tissue which need further investigation. These changes are likely benign   (non-cancerous) we will monitor with repeat left  breast ultrasound in 6 mo. The has been placed please call to schedule for 6 months from now.

## 2024-02-20 ENCOUNTER — TELEMEDICINE (OUTPATIENT)
Dept: PHARMACY | Facility: HOSPITAL | Age: 59
End: 2024-02-20
Payer: COMMERCIAL

## 2024-02-20 DIAGNOSIS — E11.65 TYPE 2 DIABETES MELLITUS WITH HYPERGLYCEMIA, WITHOUT LONG-TERM CURRENT USE OF INSULIN (MULTI): Primary | ICD-10-CM

## 2024-02-20 RX ORDER — DULAGLUTIDE 0.75 MG/.5ML
0.75 INJECTION, SOLUTION SUBCUTANEOUS
Qty: 2 ML | Refills: 0 | Status: SHIPPED | OUTPATIENT
Start: 2024-02-20 | End: 2024-02-22 | Stop reason: DRUGHIGH

## 2024-02-20 NOTE — PROGRESS NOTES
"Pharmacist Clinic: Diabetes Management  Linnette Rm is a 58 y.o. female was referred to Clinical Pharmacy Team for diabetes management. Since last visit, patient saw her PCP and lab work was done.     Referring Provider: Silvio Carr MD     HISTORY OF PRESENT ILLNESS  Approximate Date of Diagnosis: 2 years ago  Known complications due to diabetes included chronic kidney disease    Diet: patient states she is always snacking, she works at [employer] and is always around food  - 2 meals per day  - Breakfast: skips  - Lunch: crackers, chicken, shrimp, soup  - Dinner: hamburgers, pork chops, chicken, burger ruchi, applebees   - Drinks: water, no pop     Exercise Routine:   - walks around all day at work     LAB REVIEW   Glucose (mg/dL)   Date Value   11/21/2023 104 (H)   08/17/2023 270 (H)   02/14/2023 160 (H)   05/25/2022 167 (H)     Hemoglobin A1C (%)   Date Value   11/21/2023 6.5 (H)   08/17/2023 9.6 (A)   02/14/2023 8.0 (A)   12/06/2021 7.1 (A)     Bicarbonate (mmol/L)   Date Value   11/21/2023 28   08/17/2023 27   02/14/2023 26   05/25/2022 28     Urea Nitrogen (mg/dL)   Date Value   11/21/2023 22   08/17/2023 24 (H)   02/14/2023 19   05/25/2022 22     Creatinine (mg/dL)   Date Value   11/21/2023 1.16 (H)   08/17/2023 1.30 (H)   02/14/2023 1.03   05/25/2022 1.16 (H)     Lab Results   Component Value Date    HGBA1C 6.5 (H) 11/21/2023    HGBA1C 9.6 (A) 08/17/2023    HGBA1C 8.0 (A) 02/14/2023     Lab Results   Component Value Date    CHOL 173 08/17/2023    CHOL 150 02/14/2023    CHOL 147 12/06/2021     Lab Results   Component Value Date    HDL 39.0 (A) 08/17/2023    HDL 42.3 02/14/2023    HDL 43.7 12/06/2021     No results found for: \"LDLCALC\"  Lab Results   Component Value Date    TRIG 167 (H) 08/17/2023    TRIG 120 02/14/2023    TRIG 105 12/06/2021       DIABETES ASSESSMENT    CURRENT PHARMACOTHERAPY  - trulicity 1.5 mg under the skin once weekly   - farxiga 10 mg once daily     SECONDARY " PREVENTION  - Statin? Yes  - ACE-I/ARB? No    HISTORICAL PHARMACOTHERAPY:   - lantus 8 units once daily (at night, maybe takes it 3-4 times per week)   - humalog 6 units three times daily (patient takes it if she has starch in a meal, or if she has a big meal; she takes it maybe 2 times per week)     SMBG MEASUREMENTS: patient has not seen a reading over 180 since we last spoke     DISCUSSION:   Discussed lab work, last A1c came back at 6.5%, this is below goal of 7%  Trulicity: medication is currently on backorder  Plan to decrease trulicity to 0.75 mg under the skin once weekly  Farxiga: patient is tolerating this medication without issue. She denies any side effects.  CGM: good job using your CGM to test your sugars throughout the day.  Good job using the alarms to help you with high and low sugars     RECOMMENDATIONS/PLAN  1. Patients diabetes is well controlled with most recent A1c of 6.5 % (goal < 7 %).   - Continue all meds under the continuation of care with the referring provider and clinical pharmacy team.4  - Decrease trulicity to 0.75 mg once weekly until 1.5 mg is available again.     Clinical Pharmacist follow up: 1 month     Thank you,  Frieda Garcia, PharmD    Verbal consent to manage patient's drug therapy was obtained from the patient. They were informed they may decline to participate or withdraw from participation in pharmacy services at any time.

## 2024-02-21 PROBLEM — D75.89 MACROCYTOSIS: Status: ACTIVE | Noted: 2024-02-21

## 2024-02-21 PROBLEM — R20.2 PARESTHESIA: Status: ACTIVE | Noted: 2024-02-21

## 2024-02-21 PROBLEM — N89.8 VAGINAL DISCHARGE: Status: ACTIVE | Noted: 2023-08-15

## 2024-02-21 PROBLEM — M79.673 PAIN OF FOOT: Status: ACTIVE | Noted: 2024-02-21

## 2024-02-21 PROBLEM — H54.7 VISUAL IMPAIRMENT: Status: ACTIVE | Noted: 2023-08-15

## 2024-02-21 PROBLEM — M54.30 SCIATICA: Status: ACTIVE | Noted: 2022-12-15

## 2024-02-21 PROBLEM — R53.83 FATIGUE: Status: ACTIVE | Noted: 2024-02-21

## 2024-02-21 PROBLEM — N89.8 PRURITUS OF VAGINA: Status: ACTIVE | Noted: 2023-08-15

## 2024-02-21 PROBLEM — Z98.891 HISTORY OF CESAREAN SECTION: Status: ACTIVE | Noted: 2024-02-21
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
## 2024-02-21 PROBLEM — M22.2X9 PATELLOFEMORAL PAIN SYNDROME: Status: ACTIVE | Noted: 2024-02-21

## 2024-02-21 PROBLEM — A59.9 TRICHOMONIASIS: Status: ACTIVE | Noted: 2023-08-15

## 2024-02-21 PROBLEM — R40.0 DAYTIME SOMNOLENCE: Status: ACTIVE | Noted: 2024-02-21

## 2024-02-21 RX ORDER — DICLOFENAC SODIUM 10 MG/G
4 GEL TOPICAL
COMMUNITY
Start: 2019-02-15

## 2024-02-21 RX ORDER — PSEUDOEPHEDRINE HCL 30 MG
TABLET ORAL
COMMUNITY
Start: 2022-03-18 | End: 2024-03-26 | Stop reason: WASHOUT

## 2024-02-21 RX ORDER — DOXYCYCLINE HYCLATE 100 MG
TABLET ORAL EVERY 12 HOURS
COMMUNITY
Start: 2009-02-06 | End: 2024-03-26 | Stop reason: WASHOUT

## 2024-02-21 RX ORDER — QUETIAPINE FUMARATE 50 MG/1
50 TABLET, FILM COATED ORAL NIGHTLY
COMMUNITY
Start: 2019-02-15

## 2024-02-21 RX ORDER — FLUOXETINE 20 MG/1
20 TABLET ORAL DAILY
COMMUNITY

## 2024-02-21 RX ORDER — FAMOTIDINE 40 MG/1
40 TABLET, FILM COATED ORAL DAILY
COMMUNITY
Start: 2021-12-06

## 2024-02-21 RX ORDER — ASPIRIN 81 MG/1
81 TABLET ORAL DAILY
COMMUNITY
Start: 2016-05-18 | End: 2024-03-26 | Stop reason: WASHOUT

## 2024-02-22 ENCOUNTER — OFFICE VISIT (OUTPATIENT)
Dept: PRIMARY CARE | Facility: CLINIC | Age: 59
End: 2024-02-22
Payer: COMMERCIAL

## 2024-02-22 ENCOUNTER — LAB (OUTPATIENT)
Dept: LAB | Facility: LAB | Age: 59
End: 2024-02-22
Payer: COMMERCIAL

## 2024-02-22 VITALS
BODY MASS INDEX: 35.99 KG/M2 | DIASTOLIC BLOOD PRESSURE: 80 MMHG | RESPIRATION RATE: 18 BRPM | HEART RATE: 68 BPM | HEIGHT: 65 IN | SYSTOLIC BLOOD PRESSURE: 130 MMHG | OXYGEN SATURATION: 97 % | WEIGHT: 216 LBS

## 2024-02-22 DIAGNOSIS — N18.2 CKD (CHRONIC KIDNEY DISEASE) STAGE 2, GFR 60-89 ML/MIN: ICD-10-CM

## 2024-02-22 DIAGNOSIS — E78.5 HYPERLIPIDEMIA, UNSPECIFIED HYPERLIPIDEMIA TYPE: ICD-10-CM

## 2024-02-22 DIAGNOSIS — Z00.00 HEALTHCARE MAINTENANCE: ICD-10-CM

## 2024-02-22 DIAGNOSIS — I10 HYPERTENSION, UNSPECIFIED TYPE: ICD-10-CM

## 2024-02-22 DIAGNOSIS — E11.9 TYPE 2 DIABETES MELLITUS WITHOUT RETINOPATHY (MULTI): Primary | ICD-10-CM

## 2024-02-22 DIAGNOSIS — E11.9 TYPE 2 DIABETES MELLITUS WITHOUT RETINOPATHY (MULTI): ICD-10-CM

## 2024-02-22 PROBLEM — F14.21: Chronic | Status: RESOLVED | Noted: 2021-08-18 | Resolved: 2024-02-22

## 2024-02-22 PROBLEM — F12.21 CANNABIS DEPENDENCE, IN REMISSION (MULTI): Chronic | Status: RESOLVED | Noted: 2021-08-18 | Resolved: 2024-02-22

## 2024-02-22 PROBLEM — F14.20 COCAINE USE DISORDER, SEVERE, DEPENDENCE (MULTI): Chronic | Status: RESOLVED | Noted: 2021-07-23 | Resolved: 2024-02-22

## 2024-02-22 PROBLEM — J44.9 COPD (CHRONIC OBSTRUCTIVE PULMONARY DISEASE) (MULTI): Status: RESOLVED | Noted: 2023-08-15 | Resolved: 2024-02-22

## 2024-02-22 LAB
ALBUMIN SERPL BCP-MCNC: 4.1 G/DL (ref 3.4–5)
ALP SERPL-CCNC: 107 U/L (ref 33–110)
ALT SERPL W P-5'-P-CCNC: 23 U/L (ref 7–45)
ANION GAP SERPL CALC-SCNC: 13 MMOL/L (ref 10–20)
AST SERPL W P-5'-P-CCNC: 15 U/L (ref 9–39)
BASOPHILS # BLD AUTO: 0.02 X10*3/UL (ref 0–0.1)
BASOPHILS NFR BLD AUTO: 0.3 %
BILIRUB SERPL-MCNC: 0.3 MG/DL (ref 0–1.2)
BUN SERPL-MCNC: 16 MG/DL (ref 6–23)
CALCIUM SERPL-MCNC: 9.5 MG/DL (ref 8.6–10.6)
CHLORIDE SERPL-SCNC: 105 MMOL/L (ref 98–107)
CHOLEST SERPL-MCNC: 176 MG/DL (ref 0–199)
CHOLESTEROL/HDL RATIO: 3.7
CO2 SERPL-SCNC: 27 MMOL/L (ref 21–32)
CREAT SERPL-MCNC: 1.34 MG/DL (ref 0.5–1.05)
EGFRCR SERPLBLD CKD-EPI 2021: 47 ML/MIN/1.73M*2
EOSINOPHIL # BLD AUTO: 0.23 X10*3/UL (ref 0–0.7)
EOSINOPHIL NFR BLD AUTO: 3.2 %
ERYTHROCYTE [DISTWIDTH] IN BLOOD BY AUTOMATED COUNT: 16.5 % (ref 11.5–14.5)
EST. AVERAGE GLUCOSE BLD GHB EST-MCNC: 143 MG/DL
GLUCOSE SERPL-MCNC: 123 MG/DL (ref 74–99)
HBA1C MFR BLD: 6.6 %
HCT VFR BLD AUTO: 40.5 % (ref 36–46)
HDLC SERPL-MCNC: 47.3 MG/DL
HGB BLD-MCNC: 12.7 G/DL (ref 12–16)
IMM GRANULOCYTES # BLD AUTO: 0.03 X10*3/UL (ref 0–0.7)
IMM GRANULOCYTES NFR BLD AUTO: 0.4 % (ref 0–0.9)
LDLC SERPL DIRECT ASSAY-MCNC: 82 MG/DL (ref 0–129)
LYMPHOCYTES # BLD AUTO: 3.56 X10*3/UL (ref 1.2–4.8)
LYMPHOCYTES NFR BLD AUTO: 49.2 %
MCH RBC QN AUTO: 29.7 PG (ref 26–34)
MCHC RBC AUTO-ENTMCNC: 31.4 G/DL (ref 32–36)
MCV RBC AUTO: 95 FL (ref 80–100)
MONOCYTES # BLD AUTO: 0.45 X10*3/UL (ref 0.1–1)
MONOCYTES NFR BLD AUTO: 6.2 %
NEUTROPHILS # BLD AUTO: 2.95 X10*3/UL (ref 1.2–7.7)
NEUTROPHILS NFR BLD AUTO: 40.7 %
NON-HDL CHOLESTEROL: 129 MG/DL (ref 0–149)
NRBC BLD-RTO: 0 /100 WBCS (ref 0–0)
PLATELET # BLD AUTO: 259 X10*3/UL (ref 150–450)
POTASSIUM SERPL-SCNC: 4.4 MMOL/L (ref 3.5–5.3)
PROT SERPL-MCNC: 7.5 G/DL (ref 6.4–8.2)
RBC # BLD AUTO: 4.27 X10*6/UL (ref 4–5.2)
SODIUM SERPL-SCNC: 141 MMOL/L (ref 136–145)
TSH SERPL-ACNC: 0.72 MIU/L (ref 0.44–3.98)
WBC # BLD AUTO: 7.2 X10*3/UL (ref 4.4–11.3)

## 2024-02-22 PROCEDURE — 80053 COMPREHEN METABOLIC PANEL: CPT

## 2024-02-22 PROCEDURE — 83036 HEMOGLOBIN GLYCOSYLATED A1C: CPT

## 2024-02-22 PROCEDURE — 3048F LDL-C <100 MG/DL: CPT | Performed by: INTERNAL MEDICINE

## 2024-02-22 PROCEDURE — 85025 COMPLETE CBC W/AUTO DIFF WBC: CPT

## 2024-02-22 PROCEDURE — 3075F SYST BP GE 130 - 139MM HG: CPT | Performed by: INTERNAL MEDICINE

## 2024-02-22 PROCEDURE — 99214 OFFICE O/P EST MOD 30 MIN: CPT | Performed by: INTERNAL MEDICINE

## 2024-02-22 PROCEDURE — 36415 COLL VENOUS BLD VENIPUNCTURE: CPT

## 2024-02-22 PROCEDURE — 3044F HG A1C LEVEL LT 7.0%: CPT | Performed by: INTERNAL MEDICINE

## 2024-02-22 PROCEDURE — 84443 ASSAY THYROID STIM HORMONE: CPT

## 2024-02-22 PROCEDURE — 83718 ASSAY OF LIPOPROTEIN: CPT

## 2024-02-22 PROCEDURE — 83721 ASSAY OF BLOOD LIPOPROTEIN: CPT

## 2024-02-22 PROCEDURE — 3079F DIAST BP 80-89 MM HG: CPT | Performed by: INTERNAL MEDICINE

## 2024-02-22 PROCEDURE — 82465 ASSAY BLD/SERUM CHOLESTEROL: CPT

## 2024-02-22 PROCEDURE — 4010F ACE/ARB THERAPY RXD/TAKEN: CPT | Performed by: INTERNAL MEDICINE

## 2024-02-22 RX ORDER — DULAGLUTIDE 1.5 MG/.5ML
3 INJECTION, SOLUTION SUBCUTANEOUS
Qty: 8 ML | Refills: 0 | Status: SHIPPED | OUTPATIENT
Start: 2024-02-22 | End: 2024-03-18 | Stop reason: DRUGHIGH

## 2024-02-22 ASSESSMENT — ENCOUNTER SYMPTOMS
VOMITING: 0
CHILLS: 0
CONSTIPATION: 0
OCCASIONAL FEELINGS OF UNSTEADINESS: 0
BACK PAIN: 0
BLOOD IN STOOL: 0
SHORTNESS OF BREATH: 0
WHEEZING: 0
NECK PAIN: 0
ABDOMINAL DISTENTION: 0
NECK STIFFNESS: 0
JOINT SWELLING: 0
NUMBNESS: 0
SINUS PRESSURE: 0
DYSURIA: 0
DIARRHEA: 0
ABDOMINAL PAIN: 0
DIFFICULTY URINATING: 0
DEPRESSION: 0
RHINORRHEA: 0
PALPITATIONS: 0
SORE THROAT: 0
COUGH: 0
FATIGUE: 0
LIGHT-HEADEDNESS: 0
APPETITE CHANGE: 0
NAUSEA: 0
ARTHRALGIAS: 0
WEAKNESS: 0
HEADACHES: 0
DIZZINESS: 0
FEVER: 0
FREQUENCY: 0
DIAPHORESIS: 0
HEMATURIA: 0
LOSS OF SENSATION IN FEET: 0
MYALGIAS: 0

## 2024-02-22 ASSESSMENT — PATIENT HEALTH QUESTIONNAIRE - PHQ9
2. FEELING DOWN, DEPRESSED OR HOPELESS: NOT AT ALL
1. LITTLE INTEREST OR PLEASURE IN DOING THINGS: NOT AT ALL
SUM OF ALL RESPONSES TO PHQ9 QUESTIONS 1 AND 2: 0

## 2024-02-22 NOTE — ASSESSMENT & PLAN NOTE
-BP below target goal 130/80  -CMP, TSH ordered  -Continue current antihypertensives   -Counselled on weight loss low sodium diet, DASH diet and exercise

## 2024-02-22 NOTE — PROGRESS NOTES
"Subjective   Patient ID: Linnette Rm is a 58 y.o. female who presents for Follow-up.    HPI   She presents for follow-up for DMT2. She has not taken insulin since November due to issues with supply. She has been on Trulicity 1.5 mg weekly.     Review of Systems   Constitutional:  Negative for appetite change, chills, diaphoresis, fatigue and fever.   HENT:  Negative for congestion, ear discharge, ear pain, hearing loss, postnasal drip, rhinorrhea, sinus pressure, sore throat and tinnitus.    Eyes:  Negative for visual disturbance.   Respiratory:  Negative for cough, shortness of breath and wheezing.    Cardiovascular:  Negative for chest pain, palpitations and leg swelling.   Gastrointestinal:  Negative for abdominal distention, abdominal pain, blood in stool, constipation, diarrhea, nausea and vomiting.   Genitourinary:  Negative for decreased urine volume, difficulty urinating, dysuria, frequency, hematuria and urgency.   Musculoskeletal:  Negative for arthralgias, back pain, gait problem, joint swelling, myalgias, neck pain and neck stiffness.   Skin:  Negative for rash.   Neurological:  Negative for dizziness, weakness, light-headedness, numbness and headaches.         Objective   /80   Pulse 68   Resp 18   Ht 1.651 m (5' 5\")   Wt 98 kg (216 lb)   SpO2 97%   BMI 35.94 kg/m²     Physical Exam  Vitals reviewed.   Constitutional:       Appearance: Normal appearance. She is normal weight.   HENT:      Right Ear: Tympanic membrane and external ear normal.      Left Ear: Tympanic membrane and external ear normal.   Eyes:      Extraocular Movements: Extraocular movements intact.      Conjunctiva/sclera: Conjunctivae normal.      Pupils: Pupils are equal, round, and reactive to light.   Cardiovascular:      Rate and Rhythm: Normal rate and regular rhythm.      Pulses: Normal pulses.   Pulmonary:      Effort: Pulmonary effort is normal.      Breath sounds: Normal breath sounds.   Abdominal:      " General: Bowel sounds are normal.      Palpations: Abdomen is soft.   Musculoskeletal:         General: Normal range of motion.      Cervical back: Normal range of motion.   Skin:     General: Skin is warm and dry.   Neurological:      General: No focal deficit present.      Mental Status: She is oriented to person, place, and time.   Psychiatric:         Mood and Affect: Mood normal.         Behavior: Behavior normal.         Assessment/Plan   Problem List Items Addressed This Visit             ICD-10-CM    Type 2 diabetes mellitus without retinopathy (CMS/HCC) - Primary E11.9    Relevant Medications    dulaglutide (Trulicity) 1.5 mg/0.5 mL pen injector injection    Other Relevant Orders    CBC and Auto Differential (Completed)    Cholesterol, LDL Direct (Completed)    Comprehensive Metabolic Panel (Completed)    Hemoglobin A1C (Completed)    Lipid Panel Non-Fasting (Completed)    TSH with reflex to Free T4 if abnormal (Completed)    Hyperlipidemia E78.5     Repeat lipid panel  Continue Atorvastatin 40 mg daily          Hypertension I10     -BP below target goal 130/80  -CMP, TSH ordered  -Continue current antihypertensives   -Counselled on weight loss low sodium diet, DASH diet and exercise            CKD (chronic kidney disease) stage 2, GFR 60-89 ml/min N18.2     -Repeat CMP  -Medication list reviewed  -Counselled on low sodium diet and need to keep blood pressures well controlled  -Educated on avoidance of toxic medications such as NSAIDs (ibuprofen, Aleve, Motrin, diclofenac, Advil)  -Drink adequate quantities of water to remain hydrated.          Healthcare maintenance Z00.00    Relevant Medications    dulaglutide (Trulicity) 1.5 mg/0.5 mL pen injector injection    Other Relevant Orders    CBC and Auto Differential (Completed)    Cholesterol, LDL Direct (Completed)    Comprehensive Metabolic Panel (Completed)    Hemoglobin A1C (Completed)    Lipid Panel Non-Fasting (Completed)    TSH with reflex to Free T4 if  abnormal (Completed)   RTC in 3 mo

## 2024-02-26 NOTE — RESULT ENCOUNTER NOTE
Blood sugars are well-controlled.  Continue current medication.    CKD stage III, stable.  Will continue to monitor.    Blood tests are otherwise normal.

## 2024-03-18 ENCOUNTER — TELEMEDICINE (OUTPATIENT)
Dept: PHARMACY | Facility: HOSPITAL | Age: 59
End: 2024-03-18
Payer: COMMERCIAL

## 2024-03-18 DIAGNOSIS — E11.65 TYPE 2 DIABETES MELLITUS WITH HYPERGLYCEMIA, WITHOUT LONG-TERM CURRENT USE OF INSULIN (MULTI): Primary | ICD-10-CM

## 2024-03-18 RX ORDER — DULAGLUTIDE 1.5 MG/.5ML
1.5 INJECTION, SOLUTION SUBCUTANEOUS
Qty: 2 ML | Refills: 5 | Status: SHIPPED | OUTPATIENT
Start: 2024-03-18 | End: 2024-04-05 | Stop reason: DRUGHIGH

## 2024-03-18 NOTE — PROGRESS NOTES
"Pharmacist Clinic: Diabetes Management  Linnette Rm is a 58 y.o. female was referred to Clinical Pharmacy Team for diabetes management. Since last visit, patient saw her PCP and lab work was done.     Referring Provider: Silvio Carr MD     HISTORY OF PRESENT ILLNESS  Approximate Date of Diagnosis: 2 years ago  Known complications due to diabetes included chronic kidney disease    Diet: patient states she is always snacking, she works at [employer] and is always around food  - 2 meals per day  - Breakfast: skips  - Lunch: crackers, chicken, shrimp, soup  - Dinner: hamburgers, pork chops, chicken, burger ruchi, applebees   - Drinks: water, no pop     Exercise Routine:   - walks around all day at work     LAB REVIEW   Glucose (mg/dL)   Date Value   02/22/2024 123 (H)   11/21/2023 104 (H)   08/17/2023 270 (H)   02/14/2023 160 (H)   05/25/2022 167 (H)     Hemoglobin A1C (%)   Date Value   02/22/2024 6.6 (H)   11/21/2023 6.5 (H)   08/17/2023 9.6 (A)   02/14/2023 8.0 (A)   12/06/2021 7.1 (A)     Bicarbonate (mmol/L)   Date Value   02/22/2024 27   11/21/2023 28   08/17/2023 27   02/14/2023 26   05/25/2022 28     Urea Nitrogen (mg/dL)   Date Value   02/22/2024 16   11/21/2023 22   08/17/2023 24 (H)   02/14/2023 19   05/25/2022 22     Creatinine (mg/dL)   Date Value   02/22/2024 1.34 (H)   11/21/2023 1.16 (H)   08/17/2023 1.30 (H)   02/14/2023 1.03   05/25/2022 1.16 (H)     Lab Results   Component Value Date    HGBA1C 6.6 (H) 02/22/2024    HGBA1C 6.5 (H) 11/21/2023    HGBA1C 9.6 (A) 08/17/2023     Lab Results   Component Value Date    CHOL 176 02/22/2024    CHOL 173 08/17/2023    CHOL 150 02/14/2023     Lab Results   Component Value Date    HDL 47.3 02/22/2024    HDL 39.0 (A) 08/17/2023    HDL 42.3 02/14/2023     No results found for: \"LDLCALC\"  Lab Results   Component Value Date    TRIG 167 (H) 08/17/2023    TRIG 120 02/14/2023    TRIG 105 12/06/2021       DIABETES ASSESSMENT    CURRENT " PHARMACOTHERAPY  - trulicity 1.5 mg under the skin once weekly; patient has been giving herself 0.75 mg due to the backorder  - farxiga 10 mg once daily     SECONDARY PREVENTION  - Statin? Yes  - ACE-I/ARB? No    HISTORICAL PHARMACOTHERAPY:   - lantus 8 units once daily (at night, maybe takes it 3-4 times per week)   - humalog 6 units three times daily (patient takes it if she has starch in a meal, or if she has a big meal; she takes it maybe 2 times per week)     SMBG MEASUREMENTS: patient has not seen a reading over 180 since we last spoke     DISCUSSION:   Discussed lab work, last A1c came back at 6.5%, this is below goal of 7%  Trulicity: patient is tolerating the medication, however she notices her BG has been on the rise   We discussed increasing the medication to 1.5 mg under the skin once weekly   Farxiga: patient is tolerating this medication without issue. She denies any side effects.  CGM: good job using your CGM to test your sugars throughout the day.  Good job using the alarms to help you with high and low sugars     RECOMMENDATIONS/PLAN  1. Patients diabetes is well controlled with most recent A1c of 6.5 % (goal < 7 %).   - Continue all meds under the continuation of care with the referring provider and clinical pharmacy team.4  - Increase trulicity to 1.5 mg under the skin once weekly.     Clinical Pharmacist follow up: 1 month     Thank you,  Frieda Garcia, PharmD    Verbal consent to manage patient's drug therapy was obtained from the patient. They were informed they may decline to participate or withdraw from participation in pharmacy services at any time.

## 2024-03-25 DIAGNOSIS — I10 PRIMARY HYPERTENSION: ICD-10-CM

## 2024-03-25 DIAGNOSIS — Z79.4 TYPE 2 DIABETES MELLITUS WITH OTHER SPECIFIED COMPLICATION, WITH LONG-TERM CURRENT USE OF INSULIN (MULTI): ICD-10-CM

## 2024-03-25 DIAGNOSIS — E11.69 TYPE 2 DIABETES MELLITUS WITH OTHER SPECIFIED COMPLICATION, WITH LONG-TERM CURRENT USE OF INSULIN (MULTI): ICD-10-CM

## 2024-03-25 DIAGNOSIS — K21.9 GASTROESOPHAGEAL REFLUX DISEASE, UNSPECIFIED WHETHER ESOPHAGITIS PRESENT: ICD-10-CM

## 2024-03-25 DIAGNOSIS — E78.5 HYPERLIPIDEMIA, UNSPECIFIED HYPERLIPIDEMIA TYPE: ICD-10-CM

## 2024-03-26 RX ORDER — OMEPRAZOLE 20 MG/1
20 CAPSULE, DELAYED RELEASE ORAL
Qty: 90 CAPSULE | Refills: 1 | Status: SHIPPED | OUTPATIENT
Start: 2024-03-26

## 2024-03-26 RX ORDER — BLOOD SUGAR DIAGNOSTIC
STRIP MISCELLANEOUS
Qty: 100 STRIP | Refills: 2 | Status: SHIPPED | OUTPATIENT
Start: 2024-03-26

## 2024-03-26 RX ORDER — AMLODIPINE BESYLATE 10 MG/1
10 TABLET ORAL DAILY
Qty: 90 TABLET | Refills: 1 | Status: SHIPPED | OUTPATIENT
Start: 2024-03-26

## 2024-03-26 RX ORDER — FLASH GLUCOSE SENSOR
KIT MISCELLANEOUS
Qty: 2 EACH | Refills: 3 | Status: SHIPPED | OUTPATIENT
Start: 2024-03-26

## 2024-03-26 RX ORDER — ATORVASTATIN CALCIUM 40 MG/1
40 TABLET, FILM COATED ORAL DAILY
Qty: 90 TABLET | Refills: 1 | Status: SHIPPED | OUTPATIENT
Start: 2024-03-26

## 2024-03-28 ENCOUNTER — OFFICE VISIT (OUTPATIENT)
Dept: GASTROENTEROLOGY | Facility: EXTERNAL LOCATION | Age: 59
End: 2024-03-28
Payer: COMMERCIAL

## 2024-03-28 DIAGNOSIS — D12.5 BENIGN NEOPLASM OF SIGMOID COLON: ICD-10-CM

## 2024-03-28 DIAGNOSIS — Z86.010 PERSONAL HISTORY OF COLONIC POLYPS: ICD-10-CM

## 2024-03-28 DIAGNOSIS — Z12.11 SPECIAL SCREENING FOR MALIGNANT NEOPLASMS, COLON: Primary | ICD-10-CM

## 2024-03-28 DIAGNOSIS — K64.0 FIRST DEGREE HEMORRHOIDS: ICD-10-CM

## 2024-03-28 PROCEDURE — 3044F HG A1C LEVEL LT 7.0%: CPT | Performed by: INTERNAL MEDICINE

## 2024-03-28 PROCEDURE — 3048F LDL-C <100 MG/DL: CPT | Performed by: INTERNAL MEDICINE

## 2024-03-28 PROCEDURE — 45385 COLONOSCOPY W/LESION REMOVAL: CPT | Performed by: INTERNAL MEDICINE

## 2024-03-28 PROCEDURE — 88305 TISSUE EXAM BY PATHOLOGIST: CPT

## 2024-03-28 PROCEDURE — 88305 TISSUE EXAM BY PATHOLOGIST: CPT | Performed by: PATHOLOGY

## 2024-03-28 PROCEDURE — 4010F ACE/ARB THERAPY RXD/TAKEN: CPT | Performed by: INTERNAL MEDICINE

## 2024-03-29 ENCOUNTER — LAB REQUISITION (OUTPATIENT)
Dept: LAB | Facility: HOSPITAL | Age: 59
End: 2024-03-29
Payer: COMMERCIAL

## 2024-04-02 LAB
LABORATORY COMMENT REPORT: NORMAL
PATH REPORT.FINAL DX SPEC: NORMAL
PATH REPORT.GROSS SPEC: NORMAL
PATH REPORT.RELEVANT HX SPEC: NORMAL
PATH REPORT.TOTAL CANCER: NORMAL

## 2024-04-05 ENCOUNTER — TELEPHONE (OUTPATIENT)
Dept: PHARMACY | Facility: HOSPITAL | Age: 59
End: 2024-04-05
Payer: COMMERCIAL

## 2024-04-05 DIAGNOSIS — E11.65 TYPE 2 DIABETES MELLITUS WITH HYPERGLYCEMIA, WITHOUT LONG-TERM CURRENT USE OF INSULIN (MULTI): Primary | ICD-10-CM

## 2024-04-05 RX ORDER — DULAGLUTIDE 0.75 MG/.5ML
0.75 INJECTION, SOLUTION SUBCUTANEOUS
Qty: 2 ML | Refills: 1 | Status: SHIPPED | OUTPATIENT
Start: 2024-04-07 | End: 2024-04-18 | Stop reason: DRUGHIGH

## 2024-04-05 NOTE — TELEPHONE ENCOUNTER
Spoke with patient regarding her uncontrolled blood glucose. Plan to accelerate titration, one more month of 0.75 mg and from there we will double the dose for 2 weeks, depending on tolerability we will then increase to 3 mg which is more readily available compared to 1.5 mg due to the mfg backorder.     Thanks,   Frieda Garcia

## 2024-04-18 ENCOUNTER — TELEMEDICINE (OUTPATIENT)
Dept: PHARMACY | Facility: HOSPITAL | Age: 59
End: 2024-04-18
Payer: COMMERCIAL

## 2024-04-18 DIAGNOSIS — E11.65 TYPE 2 DIABETES MELLITUS WITH HYPERGLYCEMIA, WITHOUT LONG-TERM CURRENT USE OF INSULIN (MULTI): Primary | ICD-10-CM

## 2024-04-18 RX ORDER — DULAGLUTIDE 1.5 MG/.5ML
1.5 INJECTION, SOLUTION SUBCUTANEOUS
Qty: 2 ML | Refills: 1 | Status: SHIPPED | OUTPATIENT
Start: 2024-04-21 | End: 2024-05-16 | Stop reason: DRUGHIGH

## 2024-04-18 NOTE — PROGRESS NOTES
"Pharmacist Clinic: Diabetes Management  Linnette Rm is a 58 y.o. female was referred to Clinical Pharmacy Team for diabetes management. Since last visit, patient saw her PCP and lab work was done.     Referring Provider: Silvio Carr MD     HISTORY OF PRESENT ILLNESS  Approximate Date of Diagnosis: 2 years ago  Known complications due to diabetes included chronic kidney disease    Diet: patient states she is always snacking, she works at [employer] and is always around food  - 2 meals per day  - Breakfast: skips  - Lunch: crackers, chicken, shrimp, soup  - Dinner: hamburgers, pork chops, chicken, burger ruchi, applebees   - Drinks: water, no pop     Exercise Routine:   - walks around all day at work     LAB REVIEW   Glucose (mg/dL)   Date Value   02/22/2024 123 (H)   11/21/2023 104 (H)   08/17/2023 270 (H)   02/14/2023 160 (H)   05/25/2022 167 (H)     Hemoglobin A1C (%)   Date Value   02/22/2024 6.6 (H)   11/21/2023 6.5 (H)   08/17/2023 9.6 (A)   02/14/2023 8.0 (A)   12/06/2021 7.1 (A)     Bicarbonate (mmol/L)   Date Value   02/22/2024 27   11/21/2023 28   08/17/2023 27   02/14/2023 26   05/25/2022 28     Urea Nitrogen (mg/dL)   Date Value   02/22/2024 16   11/21/2023 22   08/17/2023 24 (H)   02/14/2023 19   05/25/2022 22     Creatinine (mg/dL)   Date Value   02/22/2024 1.34 (H)   11/21/2023 1.16 (H)   08/17/2023 1.30 (H)   02/14/2023 1.03   05/25/2022 1.16 (H)     Lab Results   Component Value Date    HGBA1C 6.6 (H) 02/22/2024    HGBA1C 6.5 (H) 11/21/2023    HGBA1C 9.6 (A) 08/17/2023     Lab Results   Component Value Date    CHOL 176 02/22/2024    CHOL 173 08/17/2023    CHOL 150 02/14/2023     Lab Results   Component Value Date    HDL 47.3 02/22/2024    HDL 39.0 (A) 08/17/2023    HDL 42.3 02/14/2023     No results found for: \"LDLCALC\"  Lab Results   Component Value Date    TRIG 167 (H) 08/17/2023    TRIG 120 02/14/2023    TRIG 105 12/06/2021       DIABETES ASSESSMENT    CURRENT " PHARMACOTHERAPY  - trulicity 1.5 mg under the skin once weekly; patient has been giving herself 0.75 mg due to the backorder  - farxiga 10 mg once daily     SECONDARY PREVENTION  - Statin? Yes  - ACE-I/ARB? No    HISTORICAL PHARMACOTHERAPY:   - lantus 8 units once daily (at night, maybe takes it 3-4 times per week)   - humalog 6 units three times daily (patient takes it if she has starch in a meal, or if she has a big meal; she takes it maybe 2 times per week)     SMBG MEASUREMENTS: patient has not seen a reading over 180 since we last spoke     DISCUSSION:   Discussed lab work, last A1c came back at 6.5%, this is below goal of 7%  Trulicity: patient is tolerating the medication, however she notices her BG has been on the rise   We discussed increasing the medication to 1.5 mg under the skin once weekly now that it is more available   Farxiga: patient is tolerating this medication without issue. She denies any side effects.  CGM: good job using your CGM to test your sugars throughout the day.  Good job using the alarms to help you with high and low sugars     RECOMMENDATIONS/PLAN  1. Patients diabetes is well controlled with most recent A1c of 6.5 % (goal < 7 %).   - Continue all meds under the continuation of care with the referring provider and clinical pharmacy team.4  - Increase trulicity to 1.5 mg under the skin once weekly.     Clinical Pharmacist follow up: 2 weeks     Thank you,  Frieda Garcia, PharmD    Verbal consent to manage patient's drug therapy was obtained from the patient. They were informed they may decline to participate or withdraw from participation in pharmacy services at any time.

## 2024-04-23 DIAGNOSIS — F17.200 NICOTINE DEPENDENCE, UNCOMPLICATED, UNSPECIFIED NICOTINE PRODUCT TYPE: ICD-10-CM

## 2024-04-23 DIAGNOSIS — I10 PRIMARY HYPERTENSION: ICD-10-CM

## 2024-04-25 RX ORDER — METOPROLOL SUCCINATE 50 MG/1
50 TABLET, EXTENDED RELEASE ORAL DAILY
Qty: 90 TABLET | Refills: 1 | Status: SHIPPED | OUTPATIENT
Start: 2024-04-25

## 2024-04-25 RX ORDER — VARENICLINE TARTRATE 1 MG/1
TABLET, FILM COATED ORAL
Qty: 180 TABLET | Refills: 0 | Status: SHIPPED | OUTPATIENT
Start: 2024-04-25 | End: 2024-05-23 | Stop reason: ALTCHOICE

## 2024-05-02 ENCOUNTER — TELEMEDICINE (OUTPATIENT)
Dept: PHARMACY | Facility: HOSPITAL | Age: 59
End: 2024-05-02
Payer: COMMERCIAL

## 2024-05-02 DIAGNOSIS — E11.65 TYPE 2 DIABETES MELLITUS WITH HYPERGLYCEMIA, WITHOUT LONG-TERM CURRENT USE OF INSULIN (MULTI): Primary | ICD-10-CM

## 2024-05-02 NOTE — PROGRESS NOTES
"Pharmacist Clinic: Diabetes Management  Linnette Rm is a 58 y.o. female was referred to Clinical Pharmacy Team for diabetes management. Since last visit, patient saw her PCP and lab work was done.     Referring Provider: Silvio Carr MD     HISTORY OF PRESENT ILLNESS  Approximate Date of Diagnosis: 2 years ago  Known complications due to diabetes included chronic kidney disease    Diet: patient states she is always snacking, she works at "[employer] and is always around food  - 2 meals per day  - Breakfast: skips  - Lunch: crackers, chicken, shrimp, soup  - Dinner: hamburgers, pork chops, chicken, burger ruchi, applebees   - Drinks: water, no pop     Exercise Routine:   - walks around all day at work     LAB REVIEW   Glucose (mg/dL)   Date Value   02/22/2024 123 (H)   11/21/2023 104 (H)   08/17/2023 270 (H)   02/14/2023 160 (H)   05/25/2022 167 (H)     Hemoglobin A1C (%)   Date Value   02/22/2024 6.6 (H)   11/21/2023 6.5 (H)   08/17/2023 9.6 (A)   02/14/2023 8.0 (A)   12/06/2021 7.1 (A)     Bicarbonate (mmol/L)   Date Value   02/22/2024 27   11/21/2023 28   08/17/2023 27   02/14/2023 26   05/25/2022 28     Urea Nitrogen (mg/dL)   Date Value   02/22/2024 16   11/21/2023 22   08/17/2023 24 (H)   02/14/2023 19   05/25/2022 22     Creatinine (mg/dL)   Date Value   02/22/2024 1.34 (H)   11/21/2023 1.16 (H)   08/17/2023 1.30 (H)   02/14/2023 1.03   05/25/2022 1.16 (H)     Lab Results   Component Value Date    HGBA1C 6.6 (H) 02/22/2024    HGBA1C 6.5 (H) 11/21/2023    HGBA1C 9.6 (A) 08/17/2023     Lab Results   Component Value Date    CHOL 176 02/22/2024    CHOL 173 08/17/2023    CHOL 150 02/14/2023     Lab Results   Component Value Date    HDL 47.3 02/22/2024    HDL 39.0 (A) 08/17/2023    HDL 42.3 02/14/2023     No results found for: \"LDLCALC\"  Lab Results   Component Value Date    TRIG 167 (H) 08/17/2023    TRIG 120 02/14/2023    TRIG 105 12/06/2021       DIABETES ASSESSMENT    CURRENT " PHARMACOTHERAPY  - trulicity 1.5 mg under the skin once weekly  - farxiga 10 mg once daily     SECONDARY PREVENTION  - Statin? Yes  - ACE-I/ARB? No    HISTORICAL PHARMACOTHERAPY:   - lantus 8 units once daily (at night, maybe takes it 3-4 times per week)   - humalog 6 units three times daily (patient takes it if she has starch in a meal, or if she has a big meal; she takes it maybe 2 times per week)     SMBG MEASUREMENTS:  - most numbers between 1250-130  - highest was 180 and this after eating something bad      DISCUSSION:   Discussed lab work, last A1c came back at 6.5%, this is below goal of 7%  Trulicity: patient is tolerating trulicity 1.5 mg,she has given herself 2 injections   She denies any side effects at this time  Farxiga: patient is tolerating this medication without issue. She denies any side effects.  CGM: good job using your CGM to test your sugars throughout the day.  Good job using the alarms to help you with high and low sugars     RECOMMENDATIONS/PLAN  1. Patients diabetes is well controlled with most recent A1c of 6.5 % (goal < 7 %).   - Continue all meds under the continuation of care with the referring provider and clinical pharmacy team.4    Clinical Pharmacist follow up: 2 weeks to discuss dosage increase of trulicity     Thank you,  Frieda Garcia, PharmD    Verbal consent to manage patient's drug therapy was obtained from the patient. They were informed they may decline to participate or withdraw from participation in pharmacy services at any time.

## 2024-05-16 ENCOUNTER — TELEMEDICINE (OUTPATIENT)
Dept: PHARMACY | Facility: HOSPITAL | Age: 59
End: 2024-05-16
Payer: COMMERCIAL

## 2024-05-16 DIAGNOSIS — E11.65 TYPE 2 DIABETES MELLITUS WITH HYPERGLYCEMIA, WITHOUT LONG-TERM CURRENT USE OF INSULIN (MULTI): ICD-10-CM

## 2024-05-16 RX ORDER — DAPAGLIFLOZIN 10 MG/1
10 TABLET, FILM COATED ORAL DAILY
Qty: 90 TABLET | Refills: 1 | Status: SHIPPED | OUTPATIENT
Start: 2024-05-16

## 2024-05-16 NOTE — PROGRESS NOTES
"Pharmacist Clinic: Diabetes Management  Linnette Rm is a 58 y.o. female was referred to Clinical Pharmacy Team for diabetes management. Since last visit, patient saw her PCP and lab work was done.     Referring Provider: Silvio Carr MD     HISTORY OF PRESENT ILLNESS  Approximate Date of Diagnosis: 2 years ago  Known complications due to diabetes included chronic kidney disease    Diet: patient states she is always snacking, she works at [employer] and is always around food  - 2 meals per day  - Breakfast: skips  - Lunch: crackers, chicken, shrimp, soup  - Dinner: hamburgers, pork chops, chicken, burger ruchi, applebees   - Drinks: water, no pop     Exercise Routine:   - walks around all day at work     LAB REVIEW   Glucose (mg/dL)   Date Value   02/22/2024 123 (H)   11/21/2023 104 (H)   08/17/2023 270 (H)   02/14/2023 160 (H)   05/25/2022 167 (H)     Hemoglobin A1C (%)   Date Value   02/22/2024 6.6 (H)   11/21/2023 6.5 (H)   08/17/2023 9.6 (A)   02/14/2023 8.0 (A)   12/06/2021 7.1 (A)     Bicarbonate (mmol/L)   Date Value   02/22/2024 27   11/21/2023 28   08/17/2023 27   02/14/2023 26   05/25/2022 28     Urea Nitrogen (mg/dL)   Date Value   02/22/2024 16   11/21/2023 22   08/17/2023 24 (H)   02/14/2023 19   05/25/2022 22     Creatinine (mg/dL)   Date Value   02/22/2024 1.34 (H)   11/21/2023 1.16 (H)   08/17/2023 1.30 (H)   02/14/2023 1.03   05/25/2022 1.16 (H)     Lab Results   Component Value Date    HGBA1C 6.6 (H) 02/22/2024    HGBA1C 6.5 (H) 11/21/2023    HGBA1C 9.6 (A) 08/17/2023     Lab Results   Component Value Date    CHOL 176 02/22/2024    CHOL 173 08/17/2023    CHOL 150 02/14/2023     Lab Results   Component Value Date    HDL 47.3 02/22/2024    HDL 39.0 (A) 08/17/2023    HDL 42.3 02/14/2023     No results found for: \"LDLCALC\"  Lab Results   Component Value Date    TRIG 167 (H) 08/17/2023    TRIG 120 02/14/2023    TRIG 105 12/06/2021       DIABETES ASSESSMENT    CURRENT " PHARMACOTHERAPY  - trulicity 1.5 mg under the skin once weekly  - farxiga 10 mg once daily     SECONDARY PREVENTION  - Statin? Yes  - ACE-I/ARB? No    HISTORICAL PHARMACOTHERAPY:   - lantus 8 units once daily (at night, maybe takes it 3-4 times per week)   - humalog 6 units three times daily (patient takes it if she has starch in a meal, or if she has a big meal; she takes it maybe 2 times per week)     SMBG MEASUREMENTS:  - most numbers between 125-130; however patient reports some higher numbers in the 180 range   - however this morning she saw a number over 300, discussed last night she had a pop     DISCUSSION:   Discussed lab work, last A1c came back at 6.5%, this is below goal of 7%  Trulicity: patient is tolerating trulicity, she denies any side effects at this time   Discussed increasing the dose to 3 mg   Farxiga: patient is tolerating this medication without issue. She denies any side effects.  CGM: good job using your CGM to test your sugars throughout the day.  Good job using the alarms to help you with high and low sugars     RECOMMENDATIONS/PLAN  1. Patients diabetes is well controlled with most recent A1c of 6.5 % (goal < 7 %).   - Continue all meds under the continuation of care with the referring provider and clinical pharmacy team.  - Increase trulicity to 3 mg under the skin once weekly.     Clinical Pharmacist follow up: 2 weeks    Thank you,  Frieda Garcia, PharmD    Verbal consent to manage patient's drug therapy was obtained from the patient. They were informed they may decline to participate or withdraw from participation in pharmacy services at any time.

## 2024-05-23 ENCOUNTER — OFFICE VISIT (OUTPATIENT)
Dept: PRIMARY CARE | Facility: CLINIC | Age: 59
End: 2024-05-23
Payer: COMMERCIAL

## 2024-05-23 VITALS
WEIGHT: 214.4 LBS | DIASTOLIC BLOOD PRESSURE: 80 MMHG | SYSTOLIC BLOOD PRESSURE: 136 MMHG | BODY MASS INDEX: 35.72 KG/M2 | RESPIRATION RATE: 18 BRPM | HEIGHT: 65 IN | HEART RATE: 72 BPM

## 2024-05-23 DIAGNOSIS — M17.12 OSTEOARTHRITIS OF LEFT KNEE, UNSPECIFIED OSTEOARTHRITIS TYPE: ICD-10-CM

## 2024-05-23 DIAGNOSIS — Z12.31 ENCOUNTER FOR SCREENING MAMMOGRAM FOR MALIGNANT NEOPLASM OF BREAST: ICD-10-CM

## 2024-05-23 DIAGNOSIS — I10 HYPERTENSION, UNSPECIFIED TYPE: Primary | ICD-10-CM

## 2024-05-23 DIAGNOSIS — N18.2 CKD (CHRONIC KIDNEY DISEASE) STAGE 2, GFR 60-89 ML/MIN: ICD-10-CM

## 2024-05-23 DIAGNOSIS — F17.290 OTHER TOBACCO PRODUCT NICOTINE DEPENDENCE, UNCOMPLICATED: ICD-10-CM

## 2024-05-23 DIAGNOSIS — E78.5 HYPERLIPIDEMIA, UNSPECIFIED HYPERLIPIDEMIA TYPE: ICD-10-CM

## 2024-05-23 DIAGNOSIS — E08.00 DIABETES MELLITUS DUE TO UNDERLYING CONDITION WITH HYPEROSMOLARITY WITHOUT COMA, UNSPECIFIED WHETHER LONG TERM INSULIN USE (MULTI): ICD-10-CM

## 2024-05-23 DIAGNOSIS — F17.200 NICOTINE DEPENDENCE, UNCOMPLICATED, UNSPECIFIED NICOTINE PRODUCT TYPE: ICD-10-CM

## 2024-05-23 PROCEDURE — 3079F DIAST BP 80-89 MM HG: CPT | Performed by: INTERNAL MEDICINE

## 2024-05-23 PROCEDURE — 4010F ACE/ARB THERAPY RXD/TAKEN: CPT | Performed by: INTERNAL MEDICINE

## 2024-05-23 PROCEDURE — 99214 OFFICE O/P EST MOD 30 MIN: CPT | Performed by: INTERNAL MEDICINE

## 2024-05-23 PROCEDURE — 3048F LDL-C <100 MG/DL: CPT | Performed by: INTERNAL MEDICINE

## 2024-05-23 PROCEDURE — 3044F HG A1C LEVEL LT 7.0%: CPT | Performed by: INTERNAL MEDICINE

## 2024-05-23 PROCEDURE — 3075F SYST BP GE 130 - 139MM HG: CPT | Performed by: INTERNAL MEDICINE

## 2024-05-23 RX ORDER — VARENICLINE TARTRATE 1 MG/1
TABLET, FILM COATED ORAL
Qty: 180 TABLET | Refills: 0 | Status: SHIPPED | OUTPATIENT
Start: 2024-05-23

## 2024-05-23 RX ORDER — SEMAGLUTIDE 1.34 MG/ML
1 INJECTION, SOLUTION SUBCUTANEOUS
Qty: 6 ML | Refills: 1 | Status: SHIPPED | OUTPATIENT
Start: 2024-05-23

## 2024-05-23 RX ORDER — BUDESONIDE AND FORMOTEROL FUMARATE DIHYDRATE 160; 4.5 UG/1; UG/1
AEROSOL RESPIRATORY (INHALATION)
COMMUNITY
Start: 2024-02-23

## 2024-05-23 ASSESSMENT — ENCOUNTER SYMPTOMS
ABDOMINAL PAIN: 0
DIAPHORESIS: 0
OCCASIONAL FEELINGS OF UNSTEADINESS: 0
CHILLS: 0
CONSTIPATION: 0
DIZZINESS: 0
SINUS PRESSURE: 0
ABDOMINAL DISTENTION: 0
BACK PAIN: 0
NUMBNESS: 0
LIGHT-HEADEDNESS: 0
SORE THROAT: 0
LOSS OF SENSATION IN FEET: 0
WHEEZING: 0
DIARRHEA: 0
VOMITING: 0
PALPITATIONS: 0
BLOOD IN STOOL: 0
NECK PAIN: 0
SHORTNESS OF BREATH: 0
HEADACHES: 0
NAUSEA: 0
MYALGIAS: 0
FEVER: 0
DEPRESSION: 0
RHINORRHEA: 0
HEMATURIA: 0
COUGH: 0
DYSURIA: 0
FATIGUE: 0
WEAKNESS: 0
JOINT SWELLING: 0
ARTHRALGIAS: 0
FREQUENCY: 0
NECK STIFFNESS: 0
DIFFICULTY URINATING: 0
APPETITE CHANGE: 0

## 2024-05-23 ASSESSMENT — PATIENT HEALTH QUESTIONNAIRE - PHQ9
1. LITTLE INTEREST OR PLEASURE IN DOING THINGS: NOT AT ALL
2. FEELING DOWN, DEPRESSED OR HOPELESS: NOT AT ALL
SUM OF ALL RESPONSES TO PHQ9 QUESTIONS 1 AND 2: 0

## 2024-05-23 NOTE — ASSESSMENT & PLAN NOTE
-X-rays left knee revealed moderated degenerative changes due to OA  -Use NSAIDS sparingly due CKD 3 (counseled on possible adverse effects including GI and renal with long term use at high doses)  -Orthopedics knee referral

## 2024-05-23 NOTE — PROGRESS NOTES
"Subjective   Patient ID: Linnette Rm is a 58 y.o. female who presents for Follow-up.    HPI   She presents for follow-up with complaint of left knee pain.     Review of Systems   Constitutional:  Negative for appetite change, chills, diaphoresis, fatigue and fever.   HENT:  Negative for congestion, ear discharge, ear pain, hearing loss, postnasal drip, rhinorrhea, sinus pressure, sore throat and tinnitus.    Eyes:  Negative for visual disturbance.   Respiratory:  Negative for cough, shortness of breath and wheezing.    Cardiovascular:  Negative for chest pain, palpitations and leg swelling.   Gastrointestinal:  Negative for abdominal distention, abdominal pain, blood in stool, constipation, diarrhea, nausea and vomiting.   Genitourinary:  Negative for decreased urine volume, difficulty urinating, dysuria, frequency, hematuria and urgency.   Musculoskeletal:  Negative for arthralgias, back pain, gait problem, joint swelling, myalgias, neck pain and neck stiffness.   Skin:  Negative for rash.   Neurological:  Negative for dizziness, weakness, light-headedness, numbness and headaches.         Objective   /80   Pulse 72   Resp 18   Ht 1.651 m (5' 5\")   Wt 97.3 kg (214 lb 6.4 oz)   BMI 35.68 kg/m²     Physical Exam  Neck:      Thyroid: No thyromegaly.   Cardiovascular:      Rate and Rhythm: Normal rate and regular rhythm.      Heart sounds: Normal heart sounds, S1 normal and S2 normal.   Pulmonary:      Effort: Pulmonary effort is normal.      Breath sounds: Normal breath sounds and air entry.   Musculoskeletal:      Right lower leg: No edema.      Left lower leg: No edema.         Assessment/Plan   Problem List Items Addressed This Visit             ICD-10-CM    Diabetes (Multi) E11.9    Relevant Medications    semaglutide (Ozempic) 1 mg/dose (4 mg/3 mL) pen injector    Hyperlipidemia E78.5     Repeat lipid panel  Continue Atorvastatin 40 mg daily          Hypertension - Primary I10     -BP below " target goal 130/80  -CMP, TSH ordered  -Continue amlodipine 10 mg daily, clonidine 0.1 mg 3 times daily, losartan 50 mg daily, metoprolol 50 mg daily  -Counselled on weight loss low sodium diet, DASH diet and exercise            CKD (chronic kidney disease) stage 2, GFR 60-89 ml/min N18.2     -Repeat CMP  -Medication list reviewed  -Counselled on low sodium diet and need to keep blood pressures well controlled  -Educated on avoidance of toxic medications such as NSAIDs (ibuprofen, Aleve, Motrin, diclofenac, Advil)  -Drink adequate quantities of water to remain hydrated.          Nicotine dependence, unspecified, uncomplicated F17.200     She resumed smoking due to stressful life situation  Resume chantix          Relevant Medications    varenicline (Chantix) 1 mg tablet    Encounter for screening mammogram for malignant neoplasm of breast Z12.31    Relevant Orders    BI mammo bilateral screening tomosynthesis    Osteoarthritis of left knee M17.12     -X-rays left knee revealed moderated degenerative changes due to OA  -Use NSAIDS sparingly due CKD 3 (counseled on possible adverse effects including GI and renal with long term use at high doses)  -Orthopedics knee referral         Relevant Orders    Referral to Orthopaedic Surgery   RTC in 3 mo, keep f/U with [facility] pharmacist.

## 2024-05-23 NOTE — ASSESSMENT & PLAN NOTE
-BP below target goal 130/80  -CMP, TSH ordered  -Continue amlodipine 10 mg daily, clonidine 0.1 mg 3 times daily, losartan 50 mg daily, metoprolol 50 mg daily  -Counselled on weight loss low sodium diet, DASH diet and exercise

## 2024-05-30 ENCOUNTER — TELEMEDICINE (OUTPATIENT)
Dept: PHARMACY | Facility: HOSPITAL | Age: 59
End: 2024-05-30
Payer: COMMERCIAL

## 2024-05-30 DIAGNOSIS — E11.65 TYPE 2 DIABETES MELLITUS WITH HYPERGLYCEMIA, WITHOUT LONG-TERM CURRENT USE OF INSULIN (MULTI): Primary | ICD-10-CM

## 2024-05-30 RX ORDER — DULAGLUTIDE 1.5 MG/.5ML
1.5 INJECTION, SOLUTION SUBCUTANEOUS
Qty: 2 ML | Refills: 2 | Status: SHIPPED | OUTPATIENT
Start: 2024-06-02

## 2024-05-30 NOTE — PROGRESS NOTES
"Pharmacist Clinic: Diabetes Management  Linnette Rm is a 58 y.o. female was referred to Clinical Pharmacy Team for diabetes management. Since last visit, patient saw her PCP and lab work was done.     Referring Provider: Silvio Carr MD     HISTORY OF PRESENT ILLNESS  Approximate Date of Diagnosis: 2 years ago  Known complications due to diabetes included chronic kidney disease    Diet: patient states she is always snacking, she works at [employer] and is always around food  - 2 meals per day  - Breakfast: skips  - Lunch: crackers, chicken, shrimp, soup  - Dinner: hamburgers, pork chops, chicken, burger ruchi, applebees   - Drinks: water, no pop     Exercise Routine:   - walks around all day at work     LAB REVIEW   Glucose (mg/dL)   Date Value   02/22/2024 123 (H)   11/21/2023 104 (H)   08/17/2023 270 (H)   02/14/2023 160 (H)   05/25/2022 167 (H)     Hemoglobin A1C (%)   Date Value   02/22/2024 6.6 (H)   11/21/2023 6.5 (H)   08/17/2023 9.6 (A)   02/14/2023 8.0 (A)   12/06/2021 7.1 (A)     Bicarbonate (mmol/L)   Date Value   02/22/2024 27   11/21/2023 28   08/17/2023 27   02/14/2023 26   05/25/2022 28     Urea Nitrogen (mg/dL)   Date Value   02/22/2024 16   11/21/2023 22   08/17/2023 24 (H)   02/14/2023 19   05/25/2022 22     Creatinine (mg/dL)   Date Value   02/22/2024 1.34 (H)   11/21/2023 1.16 (H)   08/17/2023 1.30 (H)   02/14/2023 1.03   05/25/2022 1.16 (H)     Lab Results   Component Value Date    HGBA1C 6.6 (H) 02/22/2024    HGBA1C 6.5 (H) 11/21/2023    HGBA1C 9.6 (A) 08/17/2023     Lab Results   Component Value Date    CHOL 176 02/22/2024    CHOL 173 08/17/2023    CHOL 150 02/14/2023     Lab Results   Component Value Date    HDL 47.3 02/22/2024    HDL 39.0 (A) 08/17/2023    HDL 42.3 02/14/2023     No results found for: \"LDLCALC\"  Lab Results   Component Value Date    TRIG 167 (H) 08/17/2023    TRIG 120 02/14/2023    TRIG 105 12/06/2021       DIABETES ASSESSMENT    CURRENT " PHARMACOTHERAPY  - trulicity 1.5 mg under the skin once weekly  - farxiga 10 mg once daily     SECONDARY PREVENTION  - Statin? Yes  - ACE-I/ARB? No    HISTORICAL PHARMACOTHERAPY:   - lantus 8 units once daily (at night, maybe takes it 3-4 times per week)   - humalog 6 units three times daily (patient takes it if she has starch in a meal, or if she has a big meal; she takes it maybe 2 times per week)     SMBG MEASUREMENTS:  - most numbers between 125-130    DISCUSSION:   Discussed lab work, last A1c came back at 6.6 %, this is below goal of 7%  Trulicity: patient is tolerating trulicity, she denies any side effects at this time   Discussed insurance will not cover other GLP1 injections, unfortunately we have to stay on trulicity   Continue on trulicity 1.5 mg once weekly   Farxiga: patient is tolerating this medication without issue. She denies any side effects.  CGM: good job using your CGM to test your sugars throughout the day.  Good job using the alarms to help you with high and low sugars     RECOMMENDATIONS/PLAN  1. Patients diabetes is well controlled with most recent A1c of 6.6 % (goal < 7 %).   - Continue all meds under the continuation of care with the referring provider and clinical pharmacy team.  - Decrease trulicity to 1.5 mg once weekly.    Clinical Pharmacist follow up: 2 weeks    Thank you,  Frieda Garcia, PharmD    Verbal consent to manage patient's drug therapy was obtained from the patient. They were informed they may decline to participate or withdraw from participation in pharmacy services at any time.

## 2024-06-13 ENCOUNTER — APPOINTMENT (OUTPATIENT)
Dept: PHARMACY | Facility: HOSPITAL | Age: 59
End: 2024-06-13
Payer: COMMERCIAL

## 2024-06-13 DIAGNOSIS — E11.65 TYPE 2 DIABETES MELLITUS WITH HYPERGLYCEMIA, WITHOUT LONG-TERM CURRENT USE OF INSULIN (MULTI): ICD-10-CM

## 2024-06-13 RX ORDER — DULAGLUTIDE 1.5 MG/.5ML
1.5 INJECTION, SOLUTION SUBCUTANEOUS
Qty: 2 ML | Refills: 5 | Status: SHIPPED | OUTPATIENT
Start: 2024-06-16

## 2024-06-13 NOTE — PROGRESS NOTES
"Pharmacist Clinic: Diabetes Management  Linnette Rm is a 58 y.o. female was referred to Clinical Pharmacy Team for diabetes management. Since last visit, patient saw her PCP and lab work was done.     Referring Provider: Silvio Carr MD     HISTORY OF PRESENT ILLNESS  Approximate Date of Diagnosis: 2 years ago  Known complications due to diabetes included chronic kidney disease    Diet: patient states she is always snacking, she works at [email or web address] and is always around food  - 2 meals per day  - Breakfast: skips  - Lunch: crackers, chicken, shrimp, soup  - Dinner: hamburgers, pork chops, chicken, burger ruchi, applebees   - Drinks: water, no pop     Exercise Routine:   - walks around all day at work     LAB REVIEW   Glucose (mg/dL)   Date Value   02/22/2024 123 (H)   11/21/2023 104 (H)   08/17/2023 270 (H)   02/14/2023 160 (H)   05/25/2022 167 (H)     Hemoglobin A1C (%)   Date Value   02/22/2024 6.6 (H)   11/21/2023 6.5 (H)   08/17/2023 9.6 (A)   02/14/2023 8.0 (A)   12/06/2021 7.1 (A)     Bicarbonate (mmol/L)   Date Value   02/22/2024 27   11/21/2023 28   08/17/2023 27   02/14/2023 26   05/25/2022 28     Urea Nitrogen (mg/dL)   Date Value   02/22/2024 16   11/21/2023 22   08/17/2023 24 (H)   02/14/2023 19   05/25/2022 22     Creatinine (mg/dL)   Date Value   02/22/2024 1.34 (H)   11/21/2023 1.16 (H)   08/17/2023 1.30 (H)   02/14/2023 1.03   05/25/2022 1.16 (H)     Lab Results   Component Value Date    HGBA1C 6.6 (H) 02/22/2024    HGBA1C 6.5 (H) 11/21/2023    HGBA1C 9.6 (A) 08/17/2023     Lab Results   Component Value Date    CHOL 176 02/22/2024    CHOL 173 08/17/2023    CHOL 150 02/14/2023     Lab Results   Component Value Date    HDL 47.3 02/22/2024    HDL 39.0 (A) 08/17/2023    HDL 42.3 02/14/2023     No results found for: \"LDLCALC\"  Lab Results   Component Value Date    TRIG 167 (H) 08/17/2023    TRIG 120 02/14/2023    TRIG 105 12/06/2021       DIABETES ASSESSMENT    CURRENT " PHARMACOTHERAPY  - trulicity 1.5 mg under the skin once weekly  - farxiga 10 mg once daily     SECONDARY PREVENTION  - Statin? Yes  - ACE-I/ARB? No    HISTORICAL PHARMACOTHERAPY:   - lantus 8 units once daily (at night, maybe takes it 3-4 times per week)   - humalog 6 units three times daily (patient takes it if she has starch in a meal, or if she has a big meal; she takes it maybe 2 times per week)     SMBG MEASUREMENTS:  - most numbers between 120-130 range     DISCUSSION:   Discussed lab work, last A1c came back at 6.6 %, this is below goal of 7%  Trulicity: patient is tolerating trulicity, she denies any side effects at this time   Continue on trulicity 1.5 mg once weekly   Farxiga: patient is tolerating this medication without issue. She denies any side effects.  CGM: good job using your CGM to test your sugars throughout the day.  Good job using the alarms to help you with high and low sugars     RECOMMENDATIONS/PLAN  1. Patients diabetes is well controlled with most recent A1c of 6.6 % (goal < 7 %).   - Continue all meds under the continuation of care with the referring provider and clinical pharmacy team.    Clinical Pharmacist follow up: as needed  Patient confirmed she will attend her next PCP appt.     Thank you,  Frieda Garcia, PharmD    Verbal consent to manage patient's drug therapy was obtained from the patient. They were informed they may decline to participate or withdraw from participation in pharmacy services at any time.

## 2024-06-17 ENCOUNTER — APPOINTMENT (OUTPATIENT)
Dept: OBSTETRICS AND GYNECOLOGY | Facility: CLINIC | Age: 59
End: 2024-06-17
Payer: COMMERCIAL

## 2024-06-20 ENCOUNTER — APPOINTMENT (OUTPATIENT)
Dept: ORTHOPEDIC SURGERY | Facility: CLINIC | Age: 59
End: 2024-06-20
Payer: COMMERCIAL

## 2024-07-11 ENCOUNTER — APPOINTMENT (OUTPATIENT)
Dept: ORTHOPEDIC SURGERY | Facility: CLINIC | Age: 59
End: 2024-07-11
Payer: COMMERCIAL

## 2024-07-11 ENCOUNTER — HOSPITAL ENCOUNTER (OUTPATIENT)
Dept: RADIOLOGY | Facility: CLINIC | Age: 59
Discharge: HOME | End: 2024-07-11
Payer: COMMERCIAL

## 2024-07-11 VITALS — BODY MASS INDEX: 35.16 KG/M2 | HEIGHT: 65 IN | WEIGHT: 211 LBS

## 2024-07-11 DIAGNOSIS — R73.9 HYPERGLYCEMIA: ICD-10-CM

## 2024-07-11 DIAGNOSIS — M25.562 CHRONIC PAIN OF LEFT KNEE: Primary | ICD-10-CM

## 2024-07-11 DIAGNOSIS — G89.29 CHRONIC PAIN OF LEFT KNEE: ICD-10-CM

## 2024-07-11 DIAGNOSIS — G89.29 CHRONIC PAIN OF LEFT KNEE: Primary | ICD-10-CM

## 2024-07-11 DIAGNOSIS — M17.12 OSTEOARTHRITIS OF LEFT KNEE, UNSPECIFIED OSTEOARTHRITIS TYPE: ICD-10-CM

## 2024-07-11 DIAGNOSIS — Z87.891 HISTORY OF SMOKING: ICD-10-CM

## 2024-07-11 DIAGNOSIS — M25.562 CHRONIC PAIN OF LEFT KNEE: ICD-10-CM

## 2024-07-11 PROCEDURE — 73564 X-RAY EXAM KNEE 4 OR MORE: CPT | Mod: LEFT SIDE | Performed by: RADIOLOGY

## 2024-07-11 PROCEDURE — 99205 OFFICE O/P NEW HI 60 MIN: CPT | Performed by: STUDENT IN AN ORGANIZED HEALTH CARE EDUCATION/TRAINING PROGRAM

## 2024-07-11 PROCEDURE — 3048F LDL-C <100 MG/DL: CPT | Performed by: STUDENT IN AN ORGANIZED HEALTH CARE EDUCATION/TRAINING PROGRAM

## 2024-07-11 PROCEDURE — 73564 X-RAY EXAM KNEE 4 OR MORE: CPT | Mod: LT

## 2024-07-11 PROCEDURE — 4010F ACE/ARB THERAPY RXD/TAKEN: CPT | Performed by: STUDENT IN AN ORGANIZED HEALTH CARE EDUCATION/TRAINING PROGRAM

## 2024-07-11 PROCEDURE — 3044F HG A1C LEVEL LT 7.0%: CPT | Performed by: STUDENT IN AN ORGANIZED HEALTH CARE EDUCATION/TRAINING PROGRAM

## 2024-07-11 ASSESSMENT — PAIN - FUNCTIONAL ASSESSMENT: PAIN_FUNCTIONAL_ASSESSMENT: 0-10

## 2024-07-11 ASSESSMENT — PAIN SCALES - GENERAL: PAINLEVEL_OUTOF10: 6

## 2024-07-11 ASSESSMENT — PAIN DESCRIPTION - DESCRIPTORS: DESCRIPTORS: ACHING
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
## 2024-07-31 DIAGNOSIS — Z79.4 TYPE 2 DIABETES MELLITUS WITH OTHER SPECIFIED COMPLICATION, WITH LONG-TERM CURRENT USE OF INSULIN (MULTI): ICD-10-CM

## 2024-07-31 DIAGNOSIS — E11.69 TYPE 2 DIABETES MELLITUS WITH OTHER SPECIFIED COMPLICATION, WITH LONG-TERM CURRENT USE OF INSULIN (MULTI): ICD-10-CM

## 2024-07-31 RX ORDER — BLOOD SUGAR DIAGNOSTIC
STRIP MISCELLANEOUS
Qty: 100 STRIP | Refills: 0 | Status: SHIPPED | OUTPATIENT
Start: 2024-07-31

## 2024-08-12 ENCOUNTER — APPOINTMENT (OUTPATIENT)
Dept: OBSTETRICS AND GYNECOLOGY | Facility: CLINIC | Age: 59
End: 2024-08-12
Payer: COMMERCIAL

## 2024-08-12 ENCOUNTER — APPOINTMENT (OUTPATIENT)
Dept: RADIOLOGY | Facility: HOSPITAL | Age: 59
End: 2024-08-12
Payer: COMMERCIAL

## 2024-08-13 ENCOUNTER — HOSPITAL ENCOUNTER (OUTPATIENT)
Dept: RADIOLOGY | Facility: HOSPITAL | Age: 59
Discharge: HOME | End: 2024-08-13
Payer: COMMERCIAL

## 2024-08-13 VITALS — WEIGHT: 213 LBS | BODY MASS INDEX: 36.37 KG/M2 | HEIGHT: 64 IN

## 2024-08-13 DIAGNOSIS — Z12.31 ENCOUNTER FOR SCREENING MAMMOGRAM FOR MALIGNANT NEOPLASM OF BREAST: ICD-10-CM

## 2024-08-13 DIAGNOSIS — R92.8 OTHER ABNORMAL AND INCONCLUSIVE FINDINGS ON DIAGNOSTIC IMAGING OF BREAST: ICD-10-CM

## 2024-08-13 DIAGNOSIS — R92.8 ABNORMAL MAMMOGRAM: ICD-10-CM

## 2024-08-13 PROCEDURE — 76982 USE 1ST TARGET LESION: CPT | Mod: LT

## 2024-08-13 PROCEDURE — 76642 ULTRASOUND BREAST LIMITED: CPT | Mod: LT

## 2024-08-13 PROCEDURE — 77062 BREAST TOMOSYNTHESIS BI: CPT

## 2024-08-13 PROCEDURE — 77066 DX MAMMO INCL CAD BI: CPT | Performed by: STUDENT IN AN ORGANIZED HEALTH CARE EDUCATION/TRAINING PROGRAM

## 2024-08-13 PROCEDURE — 76642 ULTRASOUND BREAST LIMITED: CPT | Performed by: STUDENT IN AN ORGANIZED HEALTH CARE EDUCATION/TRAINING PROGRAM

## 2024-08-13 PROCEDURE — 77062 BREAST TOMOSYNTHESIS BI: CPT | Performed by: STUDENT IN AN ORGANIZED HEALTH CARE EDUCATION/TRAINING PROGRAM

## 2024-08-14 ENCOUNTER — APPOINTMENT (OUTPATIENT)
Dept: RADIOLOGY | Facility: HOSPITAL | Age: 59
End: 2024-08-14
Payer: COMMERCIAL

## 2024-08-29 ENCOUNTER — APPOINTMENT (OUTPATIENT)
Dept: PRIMARY CARE | Facility: CLINIC | Age: 59
End: 2024-08-29
Payer: COMMERCIAL

## 2024-08-29 DIAGNOSIS — E11.69 TYPE 2 DIABETES MELLITUS WITH OTHER SPECIFIED COMPLICATION, WITH LONG-TERM CURRENT USE OF INSULIN (MULTI): ICD-10-CM

## 2024-08-29 DIAGNOSIS — Z79.4 TYPE 2 DIABETES MELLITUS WITH OTHER SPECIFIED COMPLICATION, WITH LONG-TERM CURRENT USE OF INSULIN (MULTI): ICD-10-CM

## 2024-09-01 RX ORDER — BLOOD SUGAR DIAGNOSTIC
STRIP MISCELLANEOUS
Qty: 100 STRIP | Refills: 0 | Status: SHIPPED | OUTPATIENT
Start: 2024-09-01

## 2024-09-20 DIAGNOSIS — E78.5 HYPERLIPIDEMIA, UNSPECIFIED HYPERLIPIDEMIA TYPE: ICD-10-CM

## 2024-09-20 DIAGNOSIS — K21.9 GASTROESOPHAGEAL REFLUX DISEASE, UNSPECIFIED WHETHER ESOPHAGITIS PRESENT: ICD-10-CM

## 2024-09-20 DIAGNOSIS — I10 PRIMARY HYPERTENSION: ICD-10-CM

## 2024-09-30 DIAGNOSIS — E11.9 TYPE 2 DIABETES MELLITUS WITHOUT RETINOPATHY (MULTI): Primary | ICD-10-CM

## 2024-09-30 DIAGNOSIS — E55.9 VITAMIN D DEFICIENCY: ICD-10-CM

## 2024-09-30 DIAGNOSIS — I10 PRIMARY HYPERTENSION: ICD-10-CM

## 2024-09-30 DIAGNOSIS — E78.5 HYPERLIPIDEMIA, UNSPECIFIED HYPERLIPIDEMIA TYPE: ICD-10-CM

## 2024-10-02 RX ORDER — AMLODIPINE BESYLATE 10 MG/1
10 TABLET ORAL DAILY
Qty: 14 TABLET | Refills: 0 | Status: SHIPPED | OUTPATIENT
Start: 2024-10-02

## 2024-10-02 RX ORDER — ATORVASTATIN CALCIUM 40 MG/1
40 TABLET, FILM COATED ORAL DAILY
Qty: 14 TABLET | Refills: 0 | Status: SHIPPED | OUTPATIENT
Start: 2024-10-02

## 2024-10-02 RX ORDER — OMEPRAZOLE 20 MG/1
20 CAPSULE, DELAYED RELEASE ORAL
Qty: 14 CAPSULE | Refills: 0 | Status: SHIPPED | OUTPATIENT
Start: 2024-10-02

## 2024-10-02 NOTE — TELEPHONE ENCOUNTER
[employer] message was sent to inform her of the lab orders and the need for a virtual visit

## 2024-10-03 DIAGNOSIS — E11.9 TYPE 2 DIABETES MELLITUS WITHOUT RETINOPATHY (MULTI): Primary | ICD-10-CM

## 2024-10-03 RX ORDER — BLOOD SUGAR DIAGNOSTIC
STRIP MISCELLANEOUS
Qty: 100 STRIP | Refills: 0 | Status: SHIPPED | OUTPATIENT
Start: 2024-10-03

## 2024-10-21 DIAGNOSIS — E11.65 TYPE 2 DIABETES MELLITUS WITH HYPERGLYCEMIA, WITHOUT LONG-TERM CURRENT USE OF INSULIN: ICD-10-CM

## 2024-10-21 DIAGNOSIS — I10 PRIMARY HYPERTENSION: ICD-10-CM

## 2024-10-21 DIAGNOSIS — E11.69 TYPE 2 DIABETES MELLITUS WITH OTHER SPECIFIED COMPLICATION, WITH LONG-TERM CURRENT USE OF INSULIN: ICD-10-CM

## 2024-10-21 DIAGNOSIS — E78.5 HYPERLIPIDEMIA, UNSPECIFIED HYPERLIPIDEMIA TYPE: ICD-10-CM

## 2024-10-21 DIAGNOSIS — F17.200 NICOTINE DEPENDENCE, UNCOMPLICATED, UNSPECIFIED NICOTINE PRODUCT TYPE: ICD-10-CM

## 2024-10-21 DIAGNOSIS — E11.9 TYPE 2 DIABETES MELLITUS WITHOUT RETINOPATHY (MULTI): ICD-10-CM

## 2024-10-21 DIAGNOSIS — Z79.4 TYPE 2 DIABETES MELLITUS WITH OTHER SPECIFIED COMPLICATION, WITH LONG-TERM CURRENT USE OF INSULIN: ICD-10-CM

## 2024-10-21 DIAGNOSIS — K21.9 GASTROESOPHAGEAL REFLUX DISEASE, UNSPECIFIED WHETHER ESOPHAGITIS PRESENT: ICD-10-CM

## 2024-10-23 DIAGNOSIS — K21.9 GASTROESOPHAGEAL REFLUX DISEASE WITHOUT ESOPHAGITIS: Primary | ICD-10-CM

## 2024-10-23 DIAGNOSIS — Z51.81 ENCOUNTER FOR MONITORING LONG-TERM PROTON PUMP INHIBITOR THERAPY: ICD-10-CM

## 2024-10-23 DIAGNOSIS — Z79.899 ENCOUNTER FOR MONITORING LONG-TERM PROTON PUMP INHIBITOR THERAPY: ICD-10-CM

## 2024-10-23 RX ORDER — OMEPRAZOLE 20 MG/1
CAPSULE, DELAYED RELEASE ORAL
Qty: 14 CAPSULE | Refills: 10 | OUTPATIENT
Start: 2024-10-23

## 2024-10-23 RX ORDER — DULAGLUTIDE 1.5 MG/.5ML
INJECTION, SOLUTION SUBCUTANEOUS
Qty: 2 ML | Refills: 10 | OUTPATIENT
Start: 2024-10-23

## 2024-10-23 RX ORDER — FLASH GLUCOSE SENSOR
KIT MISCELLANEOUS
Qty: 6 EACH | Refills: 1 | Status: SHIPPED | OUTPATIENT
Start: 2024-10-23

## 2024-10-23 RX ORDER — VARENICLINE TARTRATE 1 MG/1
1 TABLET, FILM COATED ORAL 2 TIMES DAILY
Qty: 60 TABLET | Refills: 0 | Status: SHIPPED | OUTPATIENT
Start: 2024-10-23

## 2024-10-23 RX ORDER — BLOOD SUGAR DIAGNOSTIC
STRIP MISCELLANEOUS
Qty: 400 STRIP | Refills: 1 | Status: SHIPPED | OUTPATIENT
Start: 2024-10-23

## 2024-10-23 RX ORDER — METOPROLOL SUCCINATE 50 MG/1
50 TABLET, EXTENDED RELEASE ORAL DAILY
Qty: 30 TABLET | Refills: 10 | OUTPATIENT
Start: 2024-10-23

## 2024-10-23 RX ORDER — VARENICLINE TARTRATE 1 MG/1
TABLET, FILM COATED ORAL
Qty: 60 TABLET | Refills: 10 | OUTPATIENT
Start: 2024-10-23

## 2024-10-23 RX ORDER — AMLODIPINE BESYLATE 10 MG/1
10 TABLET ORAL DAILY
Qty: 30 TABLET | Refills: 0 | Status: SHIPPED | OUTPATIENT
Start: 2024-10-23

## 2024-10-23 RX ORDER — METOPROLOL SUCCINATE 50 MG/1
50 TABLET, EXTENDED RELEASE ORAL DAILY
Qty: 30 TABLET | Refills: 0 | Status: SHIPPED | OUTPATIENT
Start: 2024-10-23

## 2024-10-23 RX ORDER — OMEPRAZOLE 20 MG/1
20 CAPSULE, DELAYED RELEASE ORAL
Qty: 30 CAPSULE | Refills: 0 | Status: SHIPPED | OUTPATIENT
Start: 2024-10-23

## 2024-10-23 RX ORDER — DULAGLUTIDE 1.5 MG/.5ML
1.5 INJECTION, SOLUTION SUBCUTANEOUS
Qty: 2 ML | Refills: 0 | Status: SHIPPED | OUTPATIENT
Start: 2024-10-27

## 2024-10-23 RX ORDER — AMLODIPINE BESYLATE 10 MG/1
10 TABLET ORAL DAILY
Qty: 14 TABLET | Refills: 10 | OUTPATIENT
Start: 2024-10-23

## 2024-10-23 RX ORDER — FLASH GLUCOSE SENSOR
KIT MISCELLANEOUS
Refills: 10 | OUTPATIENT
Start: 2024-10-23

## 2024-10-23 RX ORDER — ATORVASTATIN CALCIUM 40 MG/1
40 TABLET, FILM COATED ORAL DAILY
Qty: 30 TABLET | Refills: 0 | Status: SHIPPED | OUTPATIENT
Start: 2024-10-23

## 2024-10-23 RX ORDER — BLOOD SUGAR DIAGNOSTIC
STRIP MISCELLANEOUS
Refills: 10 | OUTPATIENT
Start: 2024-10-23

## 2024-10-23 RX ORDER — ATORVASTATIN CALCIUM 40 MG/1
40 TABLET, FILM COATED ORAL DAILY
Qty: 14 TABLET | Refills: 10 | OUTPATIENT
Start: 2024-10-23

## 2024-10-23 NOTE — TELEPHONE ENCOUNTER
Patient has lab orders that need to be completed. Next visit is on 11/19/24. Dispense history show she might have filled prescriptions this past week for 30 day supply, but uncertain. Left message for patient to obtain her labs before appointment for further refills.

## 2024-10-23 NOTE — TELEPHONE ENCOUNTER
Comprehensive Medication Review   A comprehensive medication review was completed with the patient. Patient had all medications and/or an updated medication list in front of them during the telephone encounter. She reports adherence but is completely out of medications and requires a refill until next appointment. She was not aware of getting labwork but will get it done for follow-up.    MEDICATION RECONCILIATION  Added: none  Changed:   Trazodone 100mg - 2 tabs at bedtime (prescribed by psych)  Bupropion XL 150mg daily (prescribed by psych)  Prazosin 1mg at bedtime for nightmares (prescribed by psych)  Removed:  Albuterol 90mcg/act prn (no longer needed since smoking stopped)  Clonidine 0.1mg TID (no longer taking)  Diclofenac 1% gel daily prn (no longer taking)  Etodolac 400mg BID (no longer taking)  Famotidine 40mg daily (switched to omeprazole)  Fluoxetine 20mg daily (no longer taking)  Gabapentin 100 mg (no longer taking)  Claritin-D 5-120mg BID (no longer taking)  Losartan 50mg daily (used to take but wasn't aware she hasn't been filling for awhile)  Melatonin 5mg at bedtime (no longer taking)  Multivitamin with folic acid 400mcg daily (no longer taking)  Quetiapine 50mg at bedtime (no longer taking)  Symbicort 160-4.5 mcg/act - 2 puffs BID (no longer needed since smoking stopped)    Current Outpatient Medications   Medication Sig Dispense Refill    amLODIPine (Norvasc) 10 mg tablet TAKE 1 TABLET BY MOUTH ONCE DAILY 14 tablet 0    atorvastatin (Lipitor) 40 mg tablet TAKE 1 TABLET BY MOUTH ONCE DAILY 14 tablet 0    buPROPion XL (Wellbutrin XL) 150 mg 24 hr tablet Take 1 tablet (150 mg) by mouth once daily.      dapagliflozin propanediol (Farxiga) 10 mg Take 1 tablet (10 mg) by mouth once daily. 90 tablet 1    flash glucose sensor kit (FreeStyle Nanci 2 Sensor) kit USE AS DIRECTED TO TEST BLOOD GLUCOSE, CHANGE EVERY 14 DAYS 2 each 3    albuterol 90 mcg/actuation inhaler Inhale.      dulaglutide (Trulicity) 1.5  mg/0.5 mL pen injector injection Inject 1.5 mg under the skin 1 (one) time per week. 2 mL 5    metoprolol succinate XL (Toprol-XL) 50 mg 24 hr tablet TAKE 1 TABLET BY MOUTH EVERY DAY 90 tablet 1    omeprazole (PriLOSEC) 20 mg DR capsule TAKE 1 CAPSULE BY MOUTH EVERY DAY IN THE MORNING BEFORE MEALS 14 capsule 0    OneTouch Ultra Test strip USE TO TEST BLOOD SUGAR 4 TIMES DAILY 100 strip 0    prazosin (Minipress) 1 mg capsule Take 1 capsule (1 mg) by mouth once daily at bedtime.      traZODone (Desyrel) 100 mg tablet Take 2 tablets (200 mg) by mouth once daily at bedtime.      varenicline (Chantix) 1 mg tablet TAKE 1 TABLET BY MOUTH TWICE DAILY. TAKE WITH A FULL GLASS OF WATER 180 tablet 0     No current facility-administered medications for this visit.       DRUG INTERACTIONS  -none to be addressed at this time    Assessment/Plan    Given that the patient is completely out of medications, refills will be provided until follow-up on 11/19/24. Patient is aware to complete labs prior to follow-up for further refills. Reports that she has not had bone density screening done yet. She is seeing a psychiatrist.    Binta Jimenez, RonaldD

## 2024-10-31 ENCOUNTER — LAB (OUTPATIENT)
Dept: LAB | Facility: LAB | Age: 59
End: 2024-10-31
Payer: COMMERCIAL

## 2024-10-31 DIAGNOSIS — Z51.81 ENCOUNTER FOR MONITORING LONG-TERM PROTON PUMP INHIBITOR THERAPY: ICD-10-CM

## 2024-10-31 DIAGNOSIS — E11.9 TYPE 2 DIABETES MELLITUS WITHOUT RETINOPATHY (MULTI): ICD-10-CM

## 2024-10-31 DIAGNOSIS — Z79.899 ENCOUNTER FOR MONITORING LONG-TERM PROTON PUMP INHIBITOR THERAPY: ICD-10-CM

## 2024-10-31 DIAGNOSIS — K21.9 GASTROESOPHAGEAL REFLUX DISEASE WITHOUT ESOPHAGITIS: ICD-10-CM

## 2024-10-31 DIAGNOSIS — E78.5 HYPERLIPIDEMIA, UNSPECIFIED HYPERLIPIDEMIA TYPE: ICD-10-CM

## 2024-10-31 DIAGNOSIS — E55.9 VITAMIN D DEFICIENCY: ICD-10-CM

## 2024-10-31 DIAGNOSIS — I10 PRIMARY HYPERTENSION: ICD-10-CM

## 2024-10-31 LAB
25(OH)D3 SERPL-MCNC: 10 NG/ML (ref 30–100)
ALBUMIN SERPL BCP-MCNC: 4.3 G/DL (ref 3.4–5)
ALP SERPL-CCNC: 133 U/L (ref 33–110)
ALT SERPL W P-5'-P-CCNC: 20 U/L (ref 7–45)
ANION GAP SERPL CALC-SCNC: 16 MMOL/L (ref 10–20)
AST SERPL W P-5'-P-CCNC: 15 U/L (ref 9–39)
BASOPHILS # BLD AUTO: 0.02 X10*3/UL (ref 0–0.1)
BASOPHILS NFR BLD AUTO: 0.3 %
BILIRUB SERPL-MCNC: 0.3 MG/DL (ref 0–1.2)
BUN SERPL-MCNC: 21 MG/DL (ref 6–23)
CALCIUM SERPL-MCNC: 9.8 MG/DL (ref 8.6–10.6)
CHLORIDE SERPL-SCNC: 103 MMOL/L (ref 98–107)
CHOLEST SERPL-MCNC: 169 MG/DL (ref 0–199)
CHOLESTEROL/HDL RATIO: 3.4
CO2 SERPL-SCNC: 27 MMOL/L (ref 21–32)
CREAT SERPL-MCNC: 1.22 MG/DL (ref 0.5–1.05)
EGFRCR SERPLBLD CKD-EPI 2021: 52 ML/MIN/1.73M*2
EOSINOPHIL # BLD AUTO: 0.14 X10*3/UL (ref 0–0.7)
EOSINOPHIL NFR BLD AUTO: 2 %
ERYTHROCYTE [DISTWIDTH] IN BLOOD BY AUTOMATED COUNT: 15.9 % (ref 11.5–14.5)
EST. AVERAGE GLUCOSE BLD GHB EST-MCNC: 266 MG/DL
GLUCOSE SERPL-MCNC: 254 MG/DL (ref 74–99)
HBA1C MFR BLD: 10.9 %
HCT VFR BLD AUTO: 43.6 % (ref 36–46)
HDLC SERPL-MCNC: 49.9 MG/DL
HGB BLD-MCNC: 13.6 G/DL (ref 12–16)
IMM GRANULOCYTES # BLD AUTO: 0.03 X10*3/UL (ref 0–0.7)
IMM GRANULOCYTES NFR BLD AUTO: 0.4 % (ref 0–0.9)
LDLC SERPL CALC-MCNC: 82 MG/DL
LYMPHOCYTES # BLD AUTO: 3.38 X10*3/UL (ref 1.2–4.8)
LYMPHOCYTES NFR BLD AUTO: 47.7 %
MAGNESIUM SERPL-MCNC: 2.07 MG/DL (ref 1.6–2.4)
MCH RBC QN AUTO: 29.3 PG (ref 26–34)
MCHC RBC AUTO-ENTMCNC: 31.2 G/DL (ref 32–36)
MCV RBC AUTO: 94 FL (ref 80–100)
MONOCYTES # BLD AUTO: 0.52 X10*3/UL (ref 0.1–1)
MONOCYTES NFR BLD AUTO: 7.3 %
NEUTROPHILS # BLD AUTO: 2.99 X10*3/UL (ref 1.2–7.7)
NEUTROPHILS NFR BLD AUTO: 42.3 %
NON HDL CHOLESTEROL: 119 MG/DL (ref 0–149)
NRBC BLD-RTO: 0 /100 WBCS (ref 0–0)
PLATELET # BLD AUTO: 262 X10*3/UL (ref 150–450)
POTASSIUM SERPL-SCNC: 4.7 MMOL/L (ref 3.5–5.3)
PROT SERPL-MCNC: 7.8 G/DL (ref 6.4–8.2)
RBC # BLD AUTO: 4.64 X10*6/UL (ref 4–5.2)
SODIUM SERPL-SCNC: 141 MMOL/L (ref 136–145)
TRIGL SERPL-MCNC: 184 MG/DL (ref 0–149)
TSH SERPL-ACNC: 1.2 MIU/L (ref 0.44–3.98)
VLDL: 37 MG/DL (ref 0–40)
WBC # BLD AUTO: 7.1 X10*3/UL (ref 4.4–11.3)

## 2024-10-31 PROCEDURE — 83036 HEMOGLOBIN GLYCOSYLATED A1C: CPT

## 2024-10-31 PROCEDURE — 87340 HEPATITIS B SURFACE AG IA: CPT

## 2024-10-31 PROCEDURE — 84443 ASSAY THYROID STIM HORMONE: CPT

## 2024-10-31 PROCEDURE — 86780 TREPONEMA PALLIDUM: CPT

## 2024-10-31 PROCEDURE — 80061 LIPID PANEL: CPT

## 2024-10-31 PROCEDURE — 36415 COLL VENOUS BLD VENIPUNCTURE: CPT

## 2024-10-31 PROCEDURE — 87389 HIV-1 AG W/HIV-1&-2 AB AG IA: CPT

## 2024-10-31 PROCEDURE — 83735 ASSAY OF MAGNESIUM: CPT

## 2024-10-31 PROCEDURE — 86318 IA INFECTIOUS AGENT ANTIBODY: CPT

## 2024-10-31 PROCEDURE — 80053 COMPREHEN METABOLIC PANEL: CPT

## 2024-10-31 PROCEDURE — 82306 VITAMIN D 25 HYDROXY: CPT

## 2024-10-31 PROCEDURE — 85025 COMPLETE CBC W/AUTO DIFF WBC: CPT

## 2024-11-05 ENCOUNTER — APPOINTMENT (OUTPATIENT)
Dept: OBSTETRICS AND GYNECOLOGY | Facility: CLINIC | Age: 59
End: 2024-11-05
Payer: COMMERCIAL

## 2024-11-05 VITALS — WEIGHT: 209 LBS | SYSTOLIC BLOOD PRESSURE: 130 MMHG | DIASTOLIC BLOOD PRESSURE: 72 MMHG | BODY MASS INDEX: 35.87 KG/M2

## 2024-11-05 DIAGNOSIS — N89.8 VAGINAL DISCHARGE: Primary | ICD-10-CM

## 2024-11-05 DIAGNOSIS — Z11.3 ROUTINE SCREENING FOR STI (SEXUALLY TRANSMITTED INFECTION): ICD-10-CM

## 2024-11-05 DIAGNOSIS — N76.0 RECURRENT VAGINITIS: ICD-10-CM

## 2024-11-05 LAB
HBV SURFACE AG SERPL QL IA: NONREACTIVE
HIV 1+2 AB+HIV1 P24 AG SERPL QL IA: NONREACTIVE
TREPONEMA PALLIDUM IGG+IGM AB [PRESENCE] IN SERUM OR PLASMA BY IMMUNOASSAY: REACTIVE

## 2024-11-05 PROCEDURE — 99214 OFFICE O/P EST MOD 30 MIN: CPT | Performed by: OBSTETRICS & GYNECOLOGY

## 2024-11-05 PROCEDURE — 87205 SMEAR GRAM STAIN: CPT

## 2024-11-05 PROCEDURE — 87491 CHLMYD TRACH DNA AMP PROBE: CPT

## 2024-11-05 PROCEDURE — 3075F SYST BP GE 130 - 139MM HG: CPT | Performed by: OBSTETRICS & GYNECOLOGY

## 2024-11-05 PROCEDURE — 3078F DIAST BP <80 MM HG: CPT | Performed by: OBSTETRICS & GYNECOLOGY

## 2024-11-05 PROCEDURE — 87591 N.GONORRHOEAE DNA AMP PROB: CPT

## 2024-11-05 RX ORDER — TERCONAZOLE 8 MG/G
1 CREAM VAGINAL NIGHTLY
Qty: 0.3 G | Refills: 2 | Status: SHIPPED | OUTPATIENT
Start: 2024-11-05 | End: 2024-11-08

## 2024-11-05 SDOH — ECONOMIC STABILITY: GENERAL
WHICH OF THE FOLLOWING DO YOU KNOW HOW TO USE AND HAVE ACCESS TO EVERY DAY? (CHOOSE ALL THAT APPLY): DESKTOP COMPUTER, LAPTOP COMPUTER, OR TABLET WITH BROADBAND INTERNET CONNECTION;SMARTPHONE WITH CELLULAR DATA PLAN

## 2024-11-05 SDOH — ECONOMIC STABILITY: TRANSPORTATION INSECURITY
IN THE PAST 12 MONTHS, HAS THE LACK OF TRANSPORTATION KEPT YOU FROM MEDICAL APPOINTMENTS OR FROM GETTING MEDICATIONS?: NO

## 2024-11-05 SDOH — ECONOMIC STABILITY: FOOD INSECURITY: WITHIN THE PAST 12 MONTHS, YOU WORRIED THAT YOUR FOOD WOULD RUN OUT BEFORE YOU GOT MONEY TO BUY MORE.: NEVER TRUE

## 2024-11-05 SDOH — HEALTH STABILITY: PHYSICAL HEALTH: ON AVERAGE, HOW MANY DAYS PER WEEK DO YOU ENGAGE IN MODERATE TO STRENUOUS EXERCISE (LIKE A BRISK WALK)?: 5 DAYS

## 2024-11-05 SDOH — ECONOMIC STABILITY: FOOD INSECURITY: WITHIN THE PAST 12 MONTHS, THE FOOD YOU BOUGHT JUST DIDN'T LAST AND YOU DIDN'T HAVE MONEY TO GET MORE.: NEVER TRUE

## 2024-11-05 SDOH — ECONOMIC STABILITY: TRANSPORTATION INSECURITY
IN THE PAST 12 MONTHS, HAS LACK OF TRANSPORTATION KEPT YOU FROM MEETINGS, WORK, OR FROM GETTING THINGS NEEDED FOR DAILY LIVING?: NO

## 2024-11-05 SDOH — ECONOMIC STABILITY: GENERAL
WHICH OF THE FOLLOWING WOULD YOU LIKE TO GET CONNECTED TO IN ORDER TO RECEIVE A DISCOUNT OR FOR FREE? (CHOOSE ALL THAT APPLY): PATIENT DECLINED

## 2024-11-05 ASSESSMENT — ANXIETY QUESTIONNAIRES
IF YOU CHECKED OFF ANY PROBLEMS ON THIS QUESTIONNAIRE, HOW DIFFICULT HAVE THESE PROBLEMS MADE IT FOR YOU TO DO YOUR WORK, TAKE CARE OF THINGS AT HOME, OR GET ALONG WITH OTHER PEOPLE: NOT DIFFICULT AT ALL
2. NOT BEING ABLE TO STOP OR CONTROL WORRYING: NOT AT ALL
5. BEING SO RESTLESS THAT IT IS HARD TO SIT STILL: NOT AT ALL
6. BECOMING EASILY ANNOYED OR IRRITABLE: NOT AT ALL
3. WORRYING TOO MUCH ABOUT DIFFERENT THINGS: NOT AT ALL
GAD7 TOTAL SCORE: 0
4. TROUBLE RELAXING: NOT AT ALL
7. FEELING AFRAID AS IF SOMETHING AWFUL MIGHT HAPPEN: NOT AT ALL
1. FEELING NERVOUS, ANXIOUS, OR ON EDGE: NOT AT ALL

## 2024-11-05 ASSESSMENT — LIFESTYLE VARIABLES
HOW OFTEN DO YOU HAVE A DRINK CONTAINING ALCOHOL: NEVER
HOW OFTEN DO YOU HAVE SIX OR MORE DRINKS ON ONE OCCASION: NEVER
AUDIT-C TOTAL SCORE: 0
SKIP TO QUESTIONS 9-10: 1
HOW MANY STANDARD DRINKS CONTAINING ALCOHOL DO YOU HAVE ON A TYPICAL DAY: PATIENT DOES NOT DRINK

## 2024-11-05 ASSESSMENT — ENCOUNTER SYMPTOMS
ALLERGIC/IMMUNOLOGIC NEGATIVE: 0
ENDOCRINE NEGATIVE: 1
NEUROLOGICAL NEGATIVE: 1
DEPRESSION: 0
MUSCULOSKELETAL NEGATIVE: 1
HEMATOLOGIC/LYMPHATIC NEGATIVE: 0
CONSTITUTIONAL NEGATIVE: 0
EYES NEGATIVE: 1
LOSS OF SENSATION IN FEET: 0
RESPIRATORY NEGATIVE: 0
ENDOCRINE NEGATIVE: 0
NEUROLOGICAL NEGATIVE: 0
MUSCULOSKELETAL NEGATIVE: 0
CARDIOVASCULAR NEGATIVE: 1
CARDIOVASCULAR NEGATIVE: 0
CONSTITUTIONAL NEGATIVE: 1
PSYCHIATRIC NEGATIVE: 1
EYES NEGATIVE: 0
PSYCHIATRIC NEGATIVE: 0
GASTROINTESTINAL NEGATIVE: 1
GASTROINTESTINAL NEGATIVE: 0
OCCASIONAL FEELINGS OF UNSTEADINESS: 0
RESPIRATORY NEGATIVE: 1

## 2024-11-05 ASSESSMENT — SOCIAL DETERMINANTS OF HEALTH (SDOH)
WITHIN THE LAST YEAR, HAVE TO BEEN RAPED OR FORCED TO HAVE ANY KIND OF SEXUAL ACTIVITY BY YOUR PARTNER OR EX-PARTNER?: NO
IN A TYPICAL WEEK, HOW MANY TIMES DO YOU TALK ON THE PHONE WITH FAMILY, FRIENDS, OR NEIGHBORS?: MORE THAN THREE TIMES A WEEK
WITHIN THE LAST YEAR, HAVE YOU BEEN KICKED, HIT, SLAPPED, OR OTHERWISE PHYSICALLY HURT BY YOUR PARTNER OR EX-PARTNER?: NO
HOW OFTEN DO YOU ATTEND CHURCH OR RELIGIOUS SERVICES?: NEVER
HOW OFTEN DO YOU ATTENT MEETINGS OF THE CLUB OR ORGANIZATION YOU BELONG TO?: NEVER
DO YOU BELONG TO ANY CLUBS OR ORGANIZATIONS SUCH AS CHURCH GROUPS UNIONS, FRATERNAL OR ATHLETIC GROUPS, OR SCHOOL GROUPS?: NO
IN THE PAST 12 MONTHS, HAS THE ELECTRIC, GAS, OIL, OR WATER COMPANY THREATENED TO SHUT OFF SERVICE IN YOUR HOME?: NO
WITHIN THE LAST YEAR, HAVE YOU BEEN AFRAID OF YOUR PARTNER OR EX-PARTNER?: NO
HOW OFTEN DO YOU GET TOGETHER WITH FRIENDS OR RELATIVES?: MORE THAN THREE TIMES A WEEK
HOW HARD IS IT FOR YOU TO PAY FOR THE VERY BASICS LIKE FOOD, HOUSING, MEDICAL CARE, AND HEATING?: NOT HARD AT ALL
WITHIN THE LAST YEAR, HAVE YOU BEEN HUMILIATED OR EMOTIONALLY ABUSED IN OTHER WAYS BY YOUR PARTNER OR EX-PARTNER?: NO

## 2024-11-05 NOTE — PATIENT INSTRUCTIONS
Coming in today for follow-up.     Cultures and labs were sent and results should be available in the next 24 to 48 hours.  You may call the office and select option #2 to speak with the nurse to obtain the results.      Review the information about vulvovaginal care.      Follow-up with your PCP and other healthcare specialist as needed.

## 2024-11-06 LAB
C TRACH RRNA SPEC QL NAA+PROBE: NEGATIVE
N GONORRHOEA DNA SPEC QL PROBE+SIG AMP: NEGATIVE
RPR SER QL: NONREACTIVE
T PALLIDUM AB SER QL AGGL: REACTIVE
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
## 2024-11-07 LAB
CLUE CELLS VAG LPF-#/AREA: PRESENT /[LPF]
NUGENT SCORE: 7
YEAST VAG WET PREP-#/AREA: PRESENT

## 2024-11-08 ENCOUNTER — TELEPHONE (OUTPATIENT)
Dept: OBSTETRICS AND GYNECOLOGY | Facility: CLINIC | Age: 59
End: 2024-11-08
Payer: COMMERCIAL

## 2024-11-08 DIAGNOSIS — N76.0 BACTERIAL VAGINOSIS: ICD-10-CM

## 2024-11-08 DIAGNOSIS — B96.89 BACTERIAL VAGINOSIS: ICD-10-CM

## 2024-11-08 DIAGNOSIS — B37.9 YEAST INFECTION: ICD-10-CM

## 2024-11-08 RX ORDER — FLUCONAZOLE 150 MG/1
150 TABLET ORAL ONCE
Qty: 1 TABLET | Refills: 0 | Status: SHIPPED | OUTPATIENT
Start: 2024-11-08 | End: 2024-11-08

## 2024-11-08 RX ORDER — METRONIDAZOLE 7.5 MG/G
GEL VAGINAL DAILY
Qty: 70 G | Refills: 0 | Status: SHIPPED | OUTPATIENT
Start: 2024-11-08 | End: 2024-11-13

## 2024-11-08 NOTE — TELEPHONE ENCOUNTER
Contacted patient to discuss lab results  Patient identified by name and .  Patient informed that she tested positive for Bacterial Vaginosis and Yeast  Explained that treatment options for BV are a vaginal gel inserted for 5 nights or an oral antibiotic taken twice daily for 7 days  Pt would like to have the Metrogel rx  Explained that treatment options for Yeast are a vaginal cream inserted for 3 nights or an oral antifungal taken one time  Pt would like to have the oral Diflucan rx  RN educated pt to take Diflucan rx after completion of the 5 day BV treatment d/t increase risk of yeast infection during antibiotic use  Medication sent to [facility]  Patient verbalized understanding and all questions and concerns were answered.  Encouraged patient to call office with any questions or concerns    ----- Message from Elma Hawkins sent at 2024 10:22 PM EST -----  Needs treatment for BV and yeast. Please send patient preferences.

## 2024-11-13 DIAGNOSIS — B96.89 BACTERIAL VAGINOSIS: ICD-10-CM

## 2024-11-13 DIAGNOSIS — N76.0 BACTERIAL VAGINOSIS: ICD-10-CM

## 2024-11-14 RX ORDER — METRONIDAZOLE 7.5 MG/G
GEL VAGINAL
Qty: 70 G | Refills: 10 | OUTPATIENT
Start: 2024-11-14

## 2024-11-17 NOTE — PROGRESS NOTES
Patient ID:   Linnette Rm is a 59 y.o. female with PMH remarkable for diabetes, HLD, HTN, CKD stage 2, and nicotine dependence who presents to the office today for med refills and lab results.  No chief complaint on file..    HEALTH MAINTENANCE: Establish Care  Previous PCP: Dr. Silvio Carr   Last Office Visit: 5/23/2024  Mammogram (40-75):   Pap smear (21-65, or hysterectomy q5yrs):  Last Labs: 10/31/2024  Colonoscopy (45-75 or age 40 with 1st degree relative dx colon ca):  Lung cancer screening (50-76 y/o + 20 pack year + smoking/quit in last 15 years):   Cardiac Calcium Scoring (55+, q 10 years):  DEXA (65+, q 2 years):       Virtual or Telephone Consent    A telephone visit (audio only) between the patient (at the originating site) and the provider (at the distant site) was utilized to provide this telehealth service.   Verbal consent was requested and obtained from Linnette Rm on this date, 11/19/24 for a telehealth visit.        HPI  Today she reports that she has not been feeling well for the last few months. She reports feeling nauseated, mouth is dry, urinating frequently.     Diabetes  A1C increased from 6.6 in February to 10.9 in October  Farxiga 10 mg daily   Trulicity 3 mg weekly -reccommended 4.5 mg weekly which she is agreeable to  She had been without the Trulicity for 3 weeks in June or July.      HLD  Last lipid panel from 10/31   -Triglycerides -> 184  Atorvastatin 40 mg daily     HTN  Amlodipine 10 mg daily  Metoprolol 50 mg daily   Minipress 1 mg at bedtime   Denies chest pain, sob, headaches, or dizziness     CKD stage 2  CMP from 10/31  -Creatinine -> 1.22  -GFR -> 52  Farxiga 10 mg     Vitamin D deficiency   Last vitamin D level checked from 10/31/2024 was low at 10  -Recommended supplement which she is agreeable  -will start 50,000 unit supplement to be taken weekly         Social History     Tobacco Use    Smoking status: Every Day     Current packs/day: 0.25      Types: Cigarettes     Passive exposure: Current    Smokeless tobacco: Never   Substance Use Topics    Alcohol use: Not Currently    Drug use: Not Currently       Immunization History   Administered Date(s) Administered    Flu vaccine (IIV4), preservative free *Check age/dose* 11/11/2019, 12/06/2021, 08/15/2023    Flu vaccine, quadrivalent, recombinant, preservative free, adult (FLUBLOK) 02/14/2023    Hepatitis B vaccine, adult *Check Product/Dose* 05/24/1996, 06/28/1996    Influenza, Unspecified 12/06/2016, 09/20/2017    Influenza, injectable, quadrivalent 11/11/2019, 12/06/2021    Influenza, seasonal, injectable 05/18/2016, 12/06/2016, 09/20/2017, 02/14/2023    Moderna SARS-CoV-2 Vaccination 03/27/2021, 04/24/2021, 12/17/2021    Pneumococcal conjugate vaccine, 20-valent (PREVNAR 20) 08/15/2023    Pneumococcal polysaccharide vaccine, 23-valent, age 2 years and older (PNEUMOVAX 23) 05/18/2016, 09/07/2018    Tdap vaccine, age 7 year and older (BOOSTRIX, ADACEL) 09/20/2017    Zoster vaccine, recombinant, adult (SHINGRIX) 09/07/2018       Review of Systems   Constitutional:  Positive for fatigue.   Respiratory: Negative.  Negative for cough and shortness of breath.    Cardiovascular: Negative.  Negative for chest pain and palpitations.   Gastrointestinal:  Positive for nausea. Negative for abdominal pain, constipation, diarrhea and vomiting.   Endocrine: Positive for polyuria.   Genitourinary: Negative.  Negative for dysuria and frequency.   Neurological: Negative.  Negative for dizziness and headaches.   Psychiatric/Behavioral: Negative.  The patient is not nervous/anxious.        No Known Allergies     Visit Vitals  LMP  (LMP Unknown)   OB Status Postmenopausal   Smoking Status Every Day       Physical Exam    Current Outpatient Medications   Medication Instructions    amLODIPine (NORVASC) 10 mg, oral, Daily    atorvastatin (LIPITOR) 40 mg, oral, Daily    blood sugar diagnostic (OneTouch Ultra Test) strip USE TO TEST  BLOOD SUGAR 4 TIMES DAILY    buPROPion XL (Wellbutrin XL) 150 mg 24 hr tablet 1 tablet, Daily    dapagliflozin propanediol (FARXIGA) 10 mg, oral, Daily    dulaglutide (TRULICITY) 1.5 mg, subcutaneous, Once Weekly    flash glucose sensor kit (FreeStyle Nanci 2 Sensor) kit USE AS DIRECTED TO TEST BLOOD GLUCOSE, CHANGE EVERY 14 DAYS    metoprolol succinate XL (TOPROL-XL) 50 mg, oral, Daily    omeprazole (PRILOSEC) 20 mg, oral, Daily before breakfast    prazosin (MINIPRESS) 1 mg, Nightly    traZODone (DESYREL) 200 mg, Nightly    varenicline (CHANTIX) 1 mg, oral, 2 times daily, Take with full glass of water       Lab Results   Component Value Date    WBC 7.1 10/31/2024    HGB 13.6 10/31/2024    HCT 43.6 10/31/2024     10/31/2024    CHOL 169 10/31/2024    TRIG 184 (H) 10/31/2024    HDL 49.9 10/31/2024    LDLDIRECT 82 02/22/2024    ALT 20 10/31/2024    AST 15 10/31/2024     10/31/2024    K 4.7 10/31/2024     10/31/2024    CREATININE 1.22 (H) 10/31/2024    BUN 21 10/31/2024    CO2 27 10/31/2024    TSH 1.20 10/31/2024    INR 1.0 05/25/2022    HGBA1C 10.9 (H) 10/31/2024       ASSESSMENT AND PLAN:  Assessment/Plan   Problem List Items Addressed This Visit             ICD-10-CM    Diabetes (Multi) E11.9    Relevant Medications    dapagliflozin propanediol (Farxiga) 10 mg    dulaglutide (Trulicity) 1.5 mg/0.5 mL pen injector injection    flash glucose sensor kit (FreeStyle Nanci 2 Sensor) kit    Other Relevant Orders    Hemoglobin A1c    Type 2 diabetes mellitus without retinopathy (Multi) E11.9     A1C has worsened from 6.6 to 10.9  -Increase Trulicity from 3 mg to 4.5 mg weekly  -Continue with Farxiga  -Message sent to Pharm D that she has been seeing for diabetes to help with management and improve outcomes  -repeat A1C in 3 months-order in place   -Encouraged to increase fiber intake   -encouraged a low-fat/cholesterol diet  -encouraged exercise as tolerated              Relevant Medications    blood sugar  diagnostic (OneTouch Ultra Test) strip    GERD (gastroesophageal reflux disease) K21.9    Relevant Medications    omeprazole (PriLOSEC) 20 mg DR capsule    Hyperlipidemia E78.5     Last lipid panel from 10/31   -Triglycerides -> 184  -continue with Atorvastatin 40 mg daily   -Encouraged to increase fiber intake   -encouraged a low-fat/cholesterol diet  -encouraged exercise as tolerated          Relevant Medications    atorvastatin (Lipitor) 40 mg tablet    Hypertension I10     Denies chest pain, SOB, headaches, or dizziness   -Continue with Amlodipine 10 mg daily   -Continue with Metoprolol 50 mg daily  -Continue with Minpress 1 mg at bedtime  -Encouraged a low-fat/cholesterol diet  -Encouraged exercise as tolerated          Relevant Medications    amLODIPine (Norvasc) 10 mg tablet    metoprolol succinate XL (Toprol-XL) 50 mg 24 hr tablet    CKD (chronic kidney disease) stage 2, GFR 60-89 ml/min N18.2     CMP from 10/31  -Creatinine -> 1.22  -GFR -> 52  -Continue with Farxiga 10 mg   -Counselled on low sodium diet and need to keep blood pressures and blood sugars well controlled  -Educated on avoidance of toxic medications such as NSAIDs (ibuprofen, Aleve, Motrin, diclofenac, Advil)  -Drink adequate quantities of water to remain hydrated.           Nicotine dependence, unspecified, uncomplicated F17.200    Relevant Medications    varenicline (Chantix) 1 mg tablet    Vitamin D deficiency - Primary E55.9    Relevant Medications    ergocalciferol (Vitamin D-2) 1.25 MG (14043 UT) capsule     Other Visit Diagnoses         Codes    Bacterial vaginosis     N76.0, B96.89    Yeast infection     B37.9    Relevant Medications    fluconazole (Diflucan) 150 mg tablet          Assessment/Plan   --------------------  Encouraged to find a new pcp in the interim that her pcp is out on leave

## 2024-11-19 ENCOUNTER — TELEMEDICINE (OUTPATIENT)
Dept: PRIMARY CARE | Facility: CLINIC | Age: 59
End: 2024-11-19
Payer: COMMERCIAL

## 2024-11-19 ENCOUNTER — APPOINTMENT (OUTPATIENT)
Dept: PRIMARY CARE | Facility: CLINIC | Age: 59
End: 2024-11-19
Payer: COMMERCIAL

## 2024-11-19 ENCOUNTER — TELEPHONE (OUTPATIENT)
Dept: PHARMACY | Facility: HOSPITAL | Age: 59
End: 2024-11-19

## 2024-11-19 DIAGNOSIS — Z79.4 TYPE 2 DIABETES MELLITUS WITH OTHER SPECIFIED COMPLICATION, WITH LONG-TERM CURRENT USE OF INSULIN: ICD-10-CM

## 2024-11-19 DIAGNOSIS — E11.69 TYPE 2 DIABETES MELLITUS WITH OTHER SPECIFIED COMPLICATION, WITH LONG-TERM CURRENT USE OF INSULIN: ICD-10-CM

## 2024-11-19 DIAGNOSIS — E11.65 TYPE 2 DIABETES MELLITUS WITH HYPERGLYCEMIA, WITHOUT LONG-TERM CURRENT USE OF INSULIN: Primary | ICD-10-CM

## 2024-11-19 DIAGNOSIS — E11.9 TYPE 2 DIABETES MELLITUS WITHOUT RETINOPATHY (MULTI): ICD-10-CM

## 2024-11-19 DIAGNOSIS — E78.5 HYPERLIPIDEMIA, UNSPECIFIED HYPERLIPIDEMIA TYPE: ICD-10-CM

## 2024-11-19 DIAGNOSIS — I10 PRIMARY HYPERTENSION: ICD-10-CM

## 2024-11-19 DIAGNOSIS — K21.9 GASTROESOPHAGEAL REFLUX DISEASE, UNSPECIFIED WHETHER ESOPHAGITIS PRESENT: ICD-10-CM

## 2024-11-19 DIAGNOSIS — N76.0 BACTERIAL VAGINOSIS: ICD-10-CM

## 2024-11-19 DIAGNOSIS — E11.65 TYPE 2 DIABETES MELLITUS WITH HYPERGLYCEMIA, WITHOUT LONG-TERM CURRENT USE OF INSULIN: ICD-10-CM

## 2024-11-19 DIAGNOSIS — N18.2 CKD (CHRONIC KIDNEY DISEASE) STAGE 2, GFR 60-89 ML/MIN: ICD-10-CM

## 2024-11-19 DIAGNOSIS — B96.89 BACTERIAL VAGINOSIS: ICD-10-CM

## 2024-11-19 DIAGNOSIS — F17.200 NICOTINE DEPENDENCE, UNCOMPLICATED, UNSPECIFIED NICOTINE PRODUCT TYPE: ICD-10-CM

## 2024-11-19 DIAGNOSIS — E55.9 VITAMIN D DEFICIENCY: Primary | ICD-10-CM

## 2024-11-19 DIAGNOSIS — B37.9 YEAST INFECTION: ICD-10-CM

## 2024-11-19 PROCEDURE — 99442 PR PHYS/QHP TELEPHONE EVALUATION 11-20 MIN: CPT

## 2024-11-19 PROCEDURE — 99406 BEHAV CHNG SMOKING 3-10 MIN: CPT

## 2024-11-19 PROCEDURE — 99213 OFFICE O/P EST LOW 20 MIN: CPT

## 2024-11-19 PROCEDURE — 3046F HEMOGLOBIN A1C LEVEL >9.0%: CPT

## 2024-11-19 PROCEDURE — 3048F LDL-C <100 MG/DL: CPT

## 2024-11-19 RX ORDER — ERGOCALCIFEROL 1.25 MG/1
50000 CAPSULE ORAL WEEKLY
Qty: 12 CAPSULE | Refills: 3 | Status: SHIPPED | OUTPATIENT
Start: 2024-11-19 | End: 2025-10-21

## 2024-11-19 RX ORDER — FLASH GLUCOSE SENSOR
KIT MISCELLANEOUS
Qty: 6 EACH | Refills: 1 | Status: SHIPPED | OUTPATIENT
Start: 2024-11-19

## 2024-11-19 RX ORDER — DAPAGLIFLOZIN 10 MG/1
10 TABLET, FILM COATED ORAL DAILY
Qty: 90 TABLET | Refills: 1 | Status: SHIPPED | OUTPATIENT
Start: 2024-11-19

## 2024-11-19 RX ORDER — METRONIDAZOLE 7.5 MG/G
GEL VAGINAL DAILY
Qty: 70 G | Refills: 0 | Status: CANCELLED | OUTPATIENT
Start: 2024-11-19 | End: 2024-11-24

## 2024-11-19 RX ORDER — FLUCONAZOLE 150 MG/1
150 TABLET ORAL ONCE
Qty: 1 TABLET | Refills: 1 | Status: SHIPPED | OUTPATIENT
Start: 2024-11-19 | End: 2024-11-19

## 2024-11-19 RX ORDER — AMLODIPINE BESYLATE 10 MG/1
10 TABLET ORAL DAILY
Qty: 90 TABLET | Refills: 1 | Status: SHIPPED | OUTPATIENT
Start: 2024-11-19

## 2024-11-19 RX ORDER — OMEPRAZOLE 20 MG/1
20 CAPSULE, DELAYED RELEASE ORAL
Qty: 90 CAPSULE | Refills: 1 | Status: SHIPPED | OUTPATIENT
Start: 2024-11-19

## 2024-11-19 RX ORDER — FLUCONAZOLE 150 MG/1
150 TABLET ORAL ONCE
COMMUNITY
Start: 2024-11-11 | End: 2024-11-19 | Stop reason: SDUPTHER

## 2024-11-19 RX ORDER — BLOOD SUGAR DIAGNOSTIC
STRIP MISCELLANEOUS
Qty: 400 STRIP | Refills: 1 | Status: SHIPPED | OUTPATIENT
Start: 2024-11-19

## 2024-11-19 RX ORDER — VARENICLINE TARTRATE 1 MG/1
1 TABLET, FILM COATED ORAL 2 TIMES DAILY
Qty: 60 TABLET | Refills: 2 | Status: SHIPPED | OUTPATIENT
Start: 2024-11-19

## 2024-11-19 RX ORDER — CYCLOBENZAPRINE HCL 10 MG
10 TABLET ORAL EVERY 8 HOURS PRN
COMMUNITY
Start: 2024-08-23

## 2024-11-19 RX ORDER — ATORVASTATIN CALCIUM 40 MG/1
40 TABLET, FILM COATED ORAL DAILY
Qty: 90 TABLET | Refills: 1 | Status: SHIPPED | OUTPATIENT
Start: 2024-11-19

## 2024-11-19 RX ORDER — DULAGLUTIDE 1.5 MG/.5ML
4.5 INJECTION, SOLUTION SUBCUTANEOUS
Qty: 3 ML | Refills: 3 | Status: SHIPPED | OUTPATIENT
Start: 2024-11-19 | End: 2024-11-19 | Stop reason: ENTERED-IN-ERROR

## 2024-11-19 RX ORDER — METOPROLOL SUCCINATE 50 MG/1
50 TABLET, EXTENDED RELEASE ORAL DAILY
Qty: 90 TABLET | Refills: 1 | Status: SHIPPED | OUTPATIENT
Start: 2024-11-19

## 2024-11-19 ASSESSMENT — COLUMBIA-SUICIDE SEVERITY RATING SCALE - C-SSRS
2. HAVE YOU ACTUALLY HAD ANY THOUGHTS OF KILLING YOURSELF?: NO
6. HAVE YOU EVER DONE ANYTHING, STARTED TO DO ANYTHING, OR PREPARED TO DO ANYTHING TO END YOUR LIFE?: NO
1. IN THE PAST MONTH, HAVE YOU WISHED YOU WERE DEAD OR WISHED YOU COULD GO TO SLEEP AND NOT WAKE UP?: NO

## 2024-11-19 ASSESSMENT — ENCOUNTER SYMPTOMS
HEADACHES: 0
DIARRHEA: 0
VOMITING: 0
NEUROLOGICAL NEGATIVE: 1
NERVOUS/ANXIOUS: 0
FATIGUE: 1
NAUSEA: 1
CARDIOVASCULAR NEGATIVE: 1
DYSURIA: 0
DIZZINESS: 0
SHORTNESS OF BREATH: 0
CONSTIPATION: 0
PSYCHIATRIC NEGATIVE: 1
RESPIRATORY NEGATIVE: 1
PALPITATIONS: 0
COUGH: 0
FREQUENCY: 0
ABDOMINAL PAIN: 0

## 2024-11-19 ASSESSMENT — PATIENT HEALTH QUESTIONNAIRE - PHQ9
SUM OF ALL RESPONSES TO PHQ9 QUESTIONS 1 AND 2: 0
2. FEELING DOWN, DEPRESSED OR HOPELESS: NOT AT ALL
1. LITTLE INTEREST OR PLEASURE IN DOING THINGS: NOT AT ALL

## 2024-11-19 NOTE — ASSESSMENT & PLAN NOTE
Last lipid panel from 10/31   -Triglycerides -> 184  -continue with Atorvastatin 40 mg daily   -Encouraged to increase fiber intake   -encouraged a low-fat/cholesterol diet  -encouraged exercise as tolerated

## 2024-11-19 NOTE — ASSESSMENT & PLAN NOTE
Denies chest pain, SOB, headaches, or dizziness   -Continue with Amlodipine 10 mg daily   -Continue with Metoprolol 50 mg daily  -Continue with Minpress 1 mg at bedtime  -Encouraged a low-fat/cholesterol diet  -Encouraged exercise as tolerated

## 2024-11-19 NOTE — TELEPHONE ENCOUNTER
Medication Refill: Trulicity    Spoke with the patient who reports that she has been using Trulicity 1.5mg weekly with the last dose on 11/16/24. She denies missing doses and has not started Trulicity 3mg. Per provider visit on 11/19/24, patient was to increase in Trulicity dose. Will send in Rx for Trulicity 3mg weekly instead of 4.5mg as protocol to avoid increased risk of GI issues and pancreatitis.

## 2024-11-19 NOTE — ASSESSMENT & PLAN NOTE
CMP from 10/31  -Creatinine -> 1.22  -GFR -> 52  -Continue with Farxiga 10 mg   -Counselled on low sodium diet and need to keep blood pressures and blood sugars well controlled  -Educated on avoidance of toxic medications such as NSAIDs (ibuprofen, Aleve, Motrin, diclofenac, Advil)  -Drink adequate quantities of water to remain hydrated.

## 2024-11-19 NOTE — ASSESSMENT & PLAN NOTE
A1C has worsened from 6.6 to 10.9  -Increase Trulicity from 3 mg to 4.5 mg weekly  -Continue with Farxiga  -Message sent to Pharm D that she has been seeing for diabetes to help with management and improve outcomes  -repeat A1C in 3 months-order in place   -Encouraged to increase fiber intake   -encouraged a low-fat/cholesterol diet  -encouraged exercise as tolerated

## 2024-11-20 DIAGNOSIS — E78.5 HYPERLIPIDEMIA, UNSPECIFIED HYPERLIPIDEMIA TYPE: ICD-10-CM

## 2024-11-20 DIAGNOSIS — I10 PRIMARY HYPERTENSION: ICD-10-CM

## 2024-11-20 DIAGNOSIS — K21.9 GASTROESOPHAGEAL REFLUX DISEASE, UNSPECIFIED WHETHER ESOPHAGITIS PRESENT: ICD-10-CM

## 2024-11-26 ENCOUNTER — APPOINTMENT (OUTPATIENT)
Dept: PHARMACY | Facility: HOSPITAL | Age: 59
End: 2024-11-26
Payer: COMMERCIAL

## 2024-12-04 RX ORDER — OMEPRAZOLE 20 MG/1
20 CAPSULE, DELAYED RELEASE ORAL
Qty: 30 CAPSULE | Refills: 10 | Status: SHIPPED | OUTPATIENT
Start: 2024-12-04

## 2024-12-04 RX ORDER — ATORVASTATIN CALCIUM 40 MG/1
40 TABLET, FILM COATED ORAL DAILY
Qty: 30 TABLET | Refills: 10 | Status: SHIPPED | OUTPATIENT
Start: 2024-12-04

## 2024-12-04 RX ORDER — AMLODIPINE BESYLATE 10 MG/1
10 TABLET ORAL DAILY
Qty: 30 TABLET | Refills: 2 | Status: SHIPPED | OUTPATIENT
Start: 2024-12-04

## 2024-12-05 ENCOUNTER — APPOINTMENT (OUTPATIENT)
Dept: PHARMACY | Facility: HOSPITAL | Age: 59
End: 2024-12-05
Payer: COMMERCIAL

## 2024-12-05 DIAGNOSIS — E11.65 TYPE 2 DIABETES MELLITUS WITH HYPERGLYCEMIA, WITHOUT LONG-TERM CURRENT USE OF INSULIN: ICD-10-CM

## 2024-12-05 DIAGNOSIS — K21.9 GASTROESOPHAGEAL REFLUX DISEASE, UNSPECIFIED WHETHER ESOPHAGITIS PRESENT: ICD-10-CM

## 2024-12-05 DIAGNOSIS — I10 PRIMARY HYPERTENSION: ICD-10-CM

## 2024-12-05 DIAGNOSIS — F17.210 CIGARETTE NICOTINE DEPENDENCE WITHOUT COMPLICATION: Primary | ICD-10-CM

## 2024-12-05 DIAGNOSIS — E78.5 HYPERLIPIDEMIA, UNSPECIFIED HYPERLIPIDEMIA TYPE: ICD-10-CM

## 2024-12-05 NOTE — Clinical Note
Increased Trulicity to further control BG and started her on nicotine 7mg patch in addition to Chantix for smoking cessation. Thanks! Binta Jimenez, PharmD

## 2024-12-05 NOTE — PROGRESS NOTES
Patient ID: Linnette Rm is a 59 y.o. female who presents for Diabetes.    PCP/Referring Provider: Silvio Carr MD - last visit: 5/23/24, next visit: 2/20/25    Subjective   No Known Allergies  HPI    Interval History: 2 years ago    Diabetes Mellitus Type 2  Known complications due to diabetes included chronic kidney disease and obesity    Current Diabetes Medications:   Farxiga 10mg daily  Trulicity 3mg weekly on Sun  No side effects  No decrease in appetite    Historical Diabetes Medications:  -medication, duration of therapy, reason discontinued:  lantus 8 units once daily (at night, maybe takes it 3-4 times per week)   humalog 6 units three times daily (patient takes it if she has starch in a meal, or if she has a big meal; she takes it maybe 2 times per week)   Metformin (afraid of side effects)    SMBG Measurements  Patient is using: both glucometer and continuous glucose monitor  The patient is currently checking the blood glucose >4 times per day.  Hypoglycemia  Patient reports none.  Hypoglycemia frequency: none  Hypoglycemia awareness: No   Reports 0 episodes of hypoglycemia for a while  Hyperglycemia  Patient reports excessive thirst, fatigue, polyuria, weakness, and nausea .  FBG (avg): 250s  lowest 197  PPBG (avg): 300s  Bedtime BG (avg): 200s    Diet: Eats 2 times throughout the day. Eats what she wants and a lot carbohydrates/starches lately  Breakfast: skips often  Brunch: hebert, eggs, ham, leftovers  Dinner: chicken, fish, takeout, fast food  Snacks: all day on chips, cookies, candy  Fluids: improved on water intake (4-8 oz of water per day), Pepsi (>24 oz x4 per day)    Physical Activity   None, mostly walking at work     Pertinent PMH Review:  PMH of Pancreatitis: No  PMH of Retinopathy: No  PMH of Urinary Tract Infections: Yes, currently. Frequently lately due to high BG.   PMH of MTC: No    Primary Prevention  The 10-year ASCVD risk score (Sandor MCNAIR, et al., 2019) is: 23.6%     Values used to calculate the score:      Age: 59 years      Sex: Female      Is Non- : Yes      Diabetic: Yes      Tobacco smoker: Yes      Systolic Blood Pressure: 130 mmHg      Is BP treated: Yes      HDL Cholesterol: 49.9 mg/dL      Total Cholesterol: 169 mg/dL    Statin? Yes - atorvastatin 40mg  ACE-I/ARB? No  Aspirin? No  Last eye exam: 2024  Last diabetic foot exam: >1 year  Has 0 sores/cuts on feet today    TOBACCO  Tobacco Use:  Patient currently reports usin cigarettes/day.    Current treatment: Chantix 1mg daily  Denies any vivid dreams  Historical: felt that nicotine patches were helpful    AFFORDABILITY/ADHERENCE   [cost or adherence barriers]  Trulicity had been on back order but is available now    Review of Systems    Objective   LMP  (LMP Unknown)    BP Readings from Last 4 Encounters:   24 130/72   24 136/80   24 130/80   23 136/80      There were no vitals filed for this visit.   LAB  Lab Results   Component Value Date    BILITOT 0.3 10/31/2024    CALCIUM 9.8 10/31/2024    CO2 27 10/31/2024     10/31/2024    CREATININE 1.22 (H) 10/31/2024    GLUCOSE 254 (H) 10/31/2024    ALKPHOS 133 (H) 10/31/2024    K 4.7 10/31/2024    PROT 7.8 10/31/2024     10/31/2024    AST 15 10/31/2024    ALT 20 10/31/2024    BUN 21 10/31/2024    ANIONGAP 16 10/31/2024    MG 2.07 10/31/2024    ALBUMIN 4.3 10/31/2024    GFRF 48 (A) 2023     Lab Results   Component Value Date    TRIG 184 (H) 10/31/2024    CHOL 169 10/31/2024    LDLCALC 82 10/31/2024    HDL 49.9 10/31/2024     Lab Results   Component Value Date    HGBA1C 10.9 (H) 10/31/2024     Current Outpatient Medications   Medication Instructions    amLODIPine (NORVASC) 10 mg, oral, Daily    atorvastatin (LIPITOR) 40 mg, oral, Daily    blood sugar diagnostic (OneTouch Ultra Test) strip USE TO TEST BLOOD SUGAR 4 TIMES DAILY    buPROPion XL (Wellbutrin XL) 150 mg 24 hr tablet 1 tablet, Daily     cyclobenzaprine (FLEXERIL) 10 mg, Every 8 hours PRN    dapagliflozin propanediol (FARXIGA) 10 mg, oral, Daily    ergocalciferol (VITAMIN D-2) 50,000 Units, oral, Weekly    flash glucose sensor kit (FreeStyle Nanci 2 Sensor) kit USE AS DIRECTED TO TEST BLOOD GLUCOSE, CHANGE EVERY 14 DAYS    metoprolol succinate XL (TOPROL-XL) 50 mg, oral, Daily    mv-min/iron/folic/calcium/vitK (WOMEN'S MULTIVITAMIN ORAL) 1 tablet, Daily    nicotine (Nicoderm CQ) 7 mg/24 hr patch 1 patch, transdermal, Every 24 hours    omeprazole (PRILOSEC) 20 mg, oral, Daily before breakfast    prazosin (MINIPRESS) 1 mg, Nightly    traZODone (DESYREL) 200 mg, Nightly    Trulicity 4.5 mg, subcutaneous, Weekly    varenicline (CHANTIX) 1 mg, oral, 2 times daily, Take with full glass of water        [facility] [id number] [place] [address]  Phone: [phone] Fax: [phone]    [facility] [address]  [address]  [address]  [facility] [address]  Phone: [phone] Fax: [phone]    [facility]  [address], [address]  [facility] [address]  Phone: [phone] Fax: [phone]      DRUG INTERACTIONS  None requiring intervention at this time    Assessment/Plan   Problem List Items Addressed This Visit       Diabetes (Multi)    Relevant Medications    dulaglutide (Trulicity) 4.5 mg/0.5 mL pen injector    nicotine (Nicoderm CQ) 7 mg/24 hr patch    Other Relevant Orders    Referral to Clinical Pharmacy    Nicotine dependence, unspecified, uncomplicated - Primary    Relevant Medications    nicotine (Nicoderm CQ) 7 mg/24 hr patch    Other Relevant Orders    Referral to Clinical Pharmacy   Patient identified self-management goal:  BG control, avoiding metformin  Patients diabetes is poorly controlled, needs to quit smoking, and needs to follow diet more regularly   Patient's goal A1c is < 7%.  Is pt at goal? No,  10.9% as of 10/31/24    Patient's SMBGs are significantly elevated  Complications: Obesity, neuropathy, smoker, alcohol dependence, CKD  Rationale for plan: Patient's current blood sugar significantly elevated due to increase in carb intake and less compliance in monitoring. No side effects with Trulicity. Will plan to increase Trulicity and continue Farxiga. Discussed potential use of metformin ER if BG is still uncontrolled.   Medication Changes:  CONTINUE:  Farxiga 10mg daily  START  Trulicity 4.5mg weekly on 12/22-  Compliance at present is estimated to be excellent. Efforts to improve compliance (if necessary) will be directed at dietary modifications: decreased carbohydrates, increased protein/vegetables/fiber, increased exercise, regular blood sugar monitoring: 3-4 times daily, and smoking cessation .  [facility] [place] invitation was sent to patient to connect  Smoking cessation  Continue Chantix 1mg twice daily  Start nicotine 7 mg patch every 24 hours  Education Provided to Patient: 15-15, smoking cessation, Libreview, diet and exercise     Pharm Follow-up: 12/26 @10 am     Binta Jimenez PharmD   Clinical Pharmacy Specialist  Phone: [phone]  Fax: [phone]    Continue all meds under the continuation of care with the referring provider and clinical pharmacy team.    Verbal consent to manage patient's drug therapy was obtained from the patient. They were informed they may decline to participate or withdraw from participation in pharmacy services at any time.

## 2024-12-10 RX ORDER — NICOTINE 7MG/24HR
1 PATCH, TRANSDERMAL 24 HOURS TRANSDERMAL EVERY 24 HOURS
Qty: 14 PATCH | Refills: 3 | Status: SHIPPED | OUTPATIENT
Start: 2024-12-10

## 2024-12-10 RX ORDER — DULAGLUTIDE 4.5 MG/.5ML
4.5 INJECTION, SOLUTION SUBCUTANEOUS WEEKLY
Qty: 2 ML | Refills: 3 | Status: SHIPPED | OUTPATIENT
Start: 2024-12-10

## 2024-12-11 RX ORDER — ATORVASTATIN CALCIUM 40 MG/1
40 TABLET, FILM COATED ORAL DAILY
Qty: 30 TABLET | Refills: 10 | Status: SHIPPED | OUTPATIENT
Start: 2024-12-11

## 2024-12-11 RX ORDER — OMEPRAZOLE 20 MG/1
20 CAPSULE, DELAYED RELEASE ORAL
Qty: 30 CAPSULE | Refills: 10 | Status: SHIPPED | OUTPATIENT
Start: 2024-12-11

## 2024-12-11 RX ORDER — AMLODIPINE BESYLATE 10 MG/1
10 TABLET ORAL DAILY
Qty: 30 TABLET | Refills: 10 | Status: SHIPPED | OUTPATIENT
Start: 2024-12-11

## 2024-12-26 ENCOUNTER — APPOINTMENT (OUTPATIENT)
Dept: PHARMACY | Facility: HOSPITAL | Age: 59
End: 2024-12-26
Payer: COMMERCIAL

## 2024-12-26 DIAGNOSIS — E11.65 TYPE 2 DIABETES MELLITUS WITH HYPERGLYCEMIA, WITHOUT LONG-TERM CURRENT USE OF INSULIN: Primary | ICD-10-CM

## 2024-12-26 DIAGNOSIS — F17.210 CIGARETTE NICOTINE DEPENDENCE WITHOUT COMPLICATION: ICD-10-CM

## 2024-12-26 RX ORDER — METFORMIN HYDROCHLORIDE 500 MG/1
500 TABLET, EXTENDED RELEASE ORAL 2 TIMES DAILY
Qty: 60 TABLET | Refills: 0 | Status: SHIPPED | OUTPATIENT
Start: 2024-12-26

## 2024-12-26 NOTE — Clinical Note
BG is improving but still not controlled. Added on metformin and will continue to monitor. Thanks! Binta Jimenez, PharmD

## 2024-12-26 NOTE — PROGRESS NOTES
Patient ID: Linnette Rm is a 59 y.o. female who presents for Diabetes and Nicotine Dependence.    PCP/Referring Provider: Silvio Carr MD - last visit: 5/23/24, next visit: 2/20/25    Subjective   No Known Allergies  HPI    Interval History: 2 years ago    Diabetes Mellitus Type 2  Known complications due to diabetes included chronic kidney disease and obesity    Current Diabetes Medications:   Farxiga 10mg daily  Trulicity 3mg weekly on Sun  No side effects  No decrease in appetite    Historical Diabetes Medications:  -medication, duration of therapy, reason discontinued:  lantus 8 units once daily (at night, maybe takes it 3-4 times per week)   humalog 6 units three times daily (patient takes it if she has starch in a meal, or if she has a big meal; she takes it maybe 2 times per week)   Metformin (afraid of side effects)    SMBG Measurements  Patient is using: both glucometer and continuous glucose monitor  The patient is currently checking the blood glucose >4 times per day.  Hypoglycemia  Patient reports none.  Hypoglycemia frequency: none  Hypoglycemia awareness: No   Reports 0 episodes of hypoglycemia for a while  Hyperglycemia  Patient reports excessive thirst, fatigue, polyuria, weakness, and nausea .  FBG (avg): 200s  lowest 197  PPBG (avg): 250-260s  Bedtime BG (avg): 200s    Diet: Eats 2 times throughout the day. Eats what she wants and a lot carbohydrates/starches lately  Breakfast: skips often  Brunch: hebert, eggs, ham, leftovers  Dinner: chicken, fish, takeout, fast food  Snacks: all day on chips, cookies, candy  Fluids: improved on water intake (4-8 oz of water per day), Pepsi (>24 oz x4 per day)    Physical Activity   None, mostly walking at work     Pertinent PMH Review:  PMH of Pancreatitis: No  PMH of Retinopathy: No  PMH of Urinary Tract Infections: Yes, currently. Frequently lately due to high BG.   PMH of MTC: No    Primary Prevention  The 10-year ASCVD risk score (Sandor MCNAIR,  et al., 2019) is: 23.6%    Values used to calculate the score:      Age: 59 years      Sex: Female      Is Non- : Yes      Diabetic: Yes      Tobacco smoker: Yes      Systolic Blood Pressure: 130 mmHg      Is BP treated: Yes      HDL Cholesterol: 49.9 mg/dL      Total Cholesterol: 169 mg/dL    Statin? Yes - atorvastatin 40mg  ACE-I/ARB? No  Aspirin? No  Last eye exam: 2024  Last diabetic foot exam: >1 year  Has 0 sores/cuts on feet today    TOBACCO  Tobacco Use:  Patient currently reports usin cigarettes/day. May have increased lately due to stress and losing her job. Hasn't started nicotine 7mg patch daily  Current treatment: Chantix 1mg daily  Denies any vivid dreams  Historical: felt that nicotine patches were helpful    AFFORDABILITY/ADHERENCE   [cost or adherence barriers]  Trulicity had been on back order but is available now    Review of Systems    Objective   LMP  (LMP Unknown)    BP Readings from Last 4 Encounters:   24 130/72   24 136/80   24 130/80   23 136/80      There were no vitals filed for this visit.   LAB  Lab Results   Component Value Date    BILITOT 0.3 10/31/2024    CALCIUM 9.8 10/31/2024    CO2 27 10/31/2024     10/31/2024    CREATININE 1.22 (H) 10/31/2024    GLUCOSE 254 (H) 10/31/2024    ALKPHOS 133 (H) 10/31/2024    K 4.7 10/31/2024    PROT 7.8 10/31/2024     10/31/2024    AST 15 10/31/2024    ALT 20 10/31/2024    BUN 21 10/31/2024    ANIONGAP 16 10/31/2024    MG 2.07 10/31/2024    ALBUMIN 4.3 10/31/2024    GFRF 48 (A) 2023     Lab Results   Component Value Date    TRIG 184 (H) 10/31/2024    CHOL 169 10/31/2024    LDLCALC 82 10/31/2024    HDL 49.9 10/31/2024     Lab Results   Component Value Date    HGBA1C 10.9 (H) 10/31/2024     Current Outpatient Medications   Medication Instructions    amLODIPine (NORVASC) 10 mg, oral, Daily    atorvastatin (LIPITOR) 40 mg, oral, Daily    blood sugar diagnostic (OneTouch Ultra  Test) strip USE TO TEST BLOOD SUGAR 4 TIMES DAILY    buPROPion XL (Wellbutrin XL) 150 mg 24 hr tablet 1 tablet, Daily    cyclobenzaprine (FLEXERIL) 10 mg, Every 8 hours PRN    dapagliflozin propanediol (FARXIGA) 10 mg, oral, Daily    ergocalciferol (VITAMIN D-2) 50,000 Units, oral, Weekly    flash glucose sensor kit (FreeStyle Nanci 2 Sensor) kit USE AS DIRECTED TO TEST BLOOD GLUCOSE, CHANGE EVERY 14 DAYS    metFORMIN XR (GLUCOPHAGE-XR) 500 mg, oral, 2 times daily, Take with food. Do not crush, chew, or split.    metoprolol succinate XL (TOPROL-XL) 50 mg, oral, Daily    mv-min/iron/folic/calcium/vitK (WOMEN'S MULTIVITAMIN ORAL) 1 tablet, Daily    nicotine (Nicoderm CQ) 7 mg/24 hr patch 1 patch, transdermal, Every 24 hours    omeprazole (PRILOSEC) 20 mg, oral, Daily before breakfast    prazosin (MINIPRESS) 1 mg, Nightly    traZODone (DESYREL) 200 mg, Nightly    Trulicity 4.5 mg, subcutaneous, Weekly    varenicline (CHANTIX) 1 mg, oral, 2 times daily, Take with full glass of water        [facility] [place] [place] [facility] [address] [address]  Phone: [phone] Fax: [phone]    [facility] [address]  [address]  [address]  [facility] [address]  Phone: [phone] Fax: [phone]    [facility]  [address], [address]  [facility] [address]  Phone: [phone] Fax: [phone]      DRUG INTERACTIONS  None requiring intervention at this time    Assessment/Plan   Problem List Items Addressed This Visit       Diabetes (Multi) - Primary    Relevant Medications    metFORMIN XR (Glucophage-XR) 500 mg 24 hr tablet    Other Relevant Orders    Referral to Clinical Pharmacy    Nicotine dependence, unspecified, uncomplicated    Relevant Orders    Referral to Clinical Pharmacy     Patient identified self-management goal:  BG control, avoiding metformin  Patients diabetes is poorly controlled, needs  to quit smoking, and needs to follow diet more regularly   Patient's goal A1c is < 7%.  Is pt at goal? No, 10.9% as of 10/31/24    Patient's SMBGs are significantly elevated  Complications: Obesity, neuropathy, smoker, alcohol dependence, CKD  Rationale for plan: Patient's current blood sugar significantly elevated due to increase in carb intake and less compliance in monitoring. No side effects with Trulicity and BG have improved. Will plan to continue Trulicity and Farxiga and start metformin ER 500mg BID for better BG control with additional monitoring.   Medication Changes:  CONTINUE:  Farxiga 10mg daily  Trulicity 4.5mg weekly on 12/22-  START:  Metformin ER 500mg BID with food   Compliance at present is estimated to be excellent. Efforts to improve compliance (if necessary) will be directed at dietary modifications: decreased carbohydrates, increased protein/vegetables/fiber, increased exercise, regular blood sugar monitoring: 3-4 times daily, and smoking cessation .  [employer] invitation was sent to patient to connect  Smoking cessation. Smoking has increased due to current stress after losing her job.   Continue Chantix 1mg twice daily  Start nicotine 7 mg patch every 24 hours  Education Provided to Patient: 15-15, smoking cessation, Libreview, diet and exercise     Pharm Follow-up: 1/24 @10 am     Binta Jimenez PharmD   Clinical Pharmacy Specialist  Phone: [phone]  Fax: [phone]    Continue all meds under the continuation of care with the referring provider and clinical pharmacy team.    Verbal consent to manage patient's drug therapy was obtained from the patient. They were informed they may decline to participate or withdraw from participation in pharmacy services at any time.

## 2025-01-21 DIAGNOSIS — N76.0 RECURRENT VAGINITIS: ICD-10-CM

## 2025-01-21 DIAGNOSIS — N89.8 VAGINAL DISCHARGE: ICD-10-CM

## 2025-01-24 ENCOUNTER — APPOINTMENT (OUTPATIENT)
Dept: PHARMACY | Facility: HOSPITAL | Age: 60
End: 2025-01-24
Payer: COMMERCIAL

## 2025-01-24 VITALS — WEIGHT: 200 LBS | BODY MASS INDEX: 34.33 KG/M2

## 2025-01-24 DIAGNOSIS — E11.65 TYPE 2 DIABETES MELLITUS WITH HYPERGLYCEMIA, WITHOUT LONG-TERM CURRENT USE OF INSULIN: ICD-10-CM

## 2025-01-24 DIAGNOSIS — F17.210 CIGARETTE NICOTINE DEPENDENCE WITHOUT COMPLICATION: ICD-10-CM

## 2025-01-24 RX ORDER — METFORMIN HYDROCHLORIDE 500 MG/1
1000 TABLET, EXTENDED RELEASE ORAL 2 TIMES DAILY
Qty: 120 TABLET | Refills: 0 | Status: SHIPPED | OUTPATIENT
Start: 2025-01-24

## 2025-01-24 NOTE — PROGRESS NOTES
Patient ID: Linnette Rm is a 59 y.o. female who presents for Diabetes.    PCP/Referring Provider: Silvio Carr MD - last visit: 5/23/24, next visit: 2/20/25    Subjective   No Known Allergies  HPI    Interval History: 2 years ago    Diabetes Mellitus Type 2  Known complications due to diabetes included chronic kidney disease and obesity    Current Diabetes Medications:   Farxiga 10mg daily  Trulicity 4.5mg weekly on Sun  No side effects  No decrease in appetite  Metformin ER 500mg BID with food  No noticeable side effects    Historical Diabetes Medications:  -medication, duration of therapy, reason discontinued:  lantus 8 units once daily (at night, maybe takes it 3-4 times per week)   humalog 6 units three times daily (patient takes it if she has starch in a meal, or if she has a big meal; she takes it maybe 2 times per week)   Metformin (afraid of side effects)    SMBG Measurements  Patient is using: both glucometer and continuous glucose monitor  The patient is currently checking the blood glucose >4 times per day.  Hypoglycemia  Patient reports none.  Hypoglycemia frequency: none  Hypoglycemia awareness: No   Reports 0 episodes of hypoglycemia for a while  Hyperglycemia  Patient reports excessive thirst, fatigue, polyuria, weakness, and nausea .  FBG (avg): 110s  highest 197  PPBG (avg): 250-260s  Bedtime BG (avg): 200s    Diet: Eats 2 times throughout the day. Eats what she wants and a lot carbohydrates/starches lately  Breakfast: skips often  Brunch: hebert, eggs, ham, leftovers  Dinner: chicken, fish, takeout, fast food  Snacks: all day on chips, cookies, candy  Fluids: improved on water intake (4-8 oz of water per day), still drinks Pepsi (>12 oz x4 per day)    Physical Activity   None, mostly walking to and at work (20 minutes)     Pertinent PMH Review:  PMH of Pancreatitis: No  PMH of Retinopathy: No  PMH of Urinary Tract Infections: Yes, currently. Frequently lately due to high BG.   PMH  of MTC: No    Primary Prevention  The 10-year ASCVD risk score (Sandor MCNAIR, et al., 2019) is: 23.6%    Values used to calculate the score:      Age: 59 years      Sex: Female      Is Non- : Yes      Diabetic: Yes      Tobacco smoker: Yes      Systolic Blood Pressure: 130 mmHg      Is BP treated: Yes      HDL Cholesterol: 49.9 mg/dL      Total Cholesterol: 169 mg/dL    Statin? Yes - atorvastatin 40mg  ACE-I/ARB? No  Aspirin? No  Last eye exam: 2024  Last diabetic foot exam: >1 year  Has 0 sores/cuts on feet today    TOBACCO  Tobacco Use:  Patient currently reports usin cigarettes/day. Has not increased or decreased lately. Hasn't started nicotine 7mg patch daily due to daily stress. Sometimes smokes within or after 30 min of waking up.   Current treatment: Chantix 1mg daily  Denies any vivid dreams  Historical: felt that nicotine patches were helpful but still hasn't used yet    AFFORDABILITY/ADHERENCE   [cost or adherence barriers]  Trulicity had been on back order but is available now    Review of Systems    Objective   Wt 90.7 kg (200 lb)   LMP  (LMP Unknown)   BMI 34.33 kg/m²    BP Readings from Last 4 Encounters:   24 130/72   24 136/80   24 130/80   23 136/80      Vitals:    25 1025   Weight: 90.7 kg (200 lb)      LAB  Lab Results   Component Value Date    BILITOT 0.3 10/31/2024    CALCIUM 9.8 10/31/2024    CO2 27 10/31/2024     10/31/2024    CREATININE 1.22 (H) 10/31/2024    GLUCOSE 254 (H) 10/31/2024    ALKPHOS 133 (H) 10/31/2024    K 4.7 10/31/2024    PROT 7.8 10/31/2024     10/31/2024    AST 15 10/31/2024    ALT 20 10/31/2024    BUN 21 10/31/2024    ANIONGAP 16 10/31/2024    MG 2.07 10/31/2024    ALBUMIN 4.3 10/31/2024    GFRF 48 (A) 2023     Lab Results   Component Value Date    TRIG 184 (H) 10/31/2024    CHOL 169 10/31/2024    LDLCALC 82 10/31/2024    HDL 49.9 10/31/2024     Lab Results   Component Value Date    HGBA1C 10.9  (H) 10/31/2024     Current Outpatient Medications   Medication Instructions    amLODIPine (NORVASC) 10 mg, oral, Daily    atorvastatin (LIPITOR) 40 mg, oral, Daily    blood sugar diagnostic (OneTouch Ultra Test) strip USE TO TEST BLOOD SUGAR 4 TIMES DAILY    buPROPion XL (Wellbutrin XL) 150 mg 24 hr tablet 1 tablet, Daily    cyclobenzaprine (FLEXERIL) 10 mg, Every 8 hours PRN    dapagliflozin propanediol (FARXIGA) 10 mg, oral, Daily    ergocalciferol (VITAMIN D-2) 50,000 Units, oral, Weekly    flash glucose sensor kit (FreeStyle Nanci 2 Sensor) kit USE AS DIRECTED TO TEST BLOOD GLUCOSE, CHANGE EVERY 14 DAYS    metFORMIN XR (GLUCOPHAGE-XR) 1,000 mg, oral, 2 times daily, Take with food. Do not crush, chew, or split.    metoprolol succinate XL (TOPROL-XL) 50 mg, oral, Daily    mv-min/iron/folic/calcium/vitK (WOMEN'S MULTIVITAMIN ORAL) 1 tablet, Daily    nicotine (Nicoderm CQ) 7 mg/24 hr patch 1 patch, transdermal, Every 24 hours    omeprazole (PRILOSEC) 20 mg, oral, Daily before breakfast    prazosin (MINIPRESS) 1 mg, Nightly    traZODone (DESYREL) 200 mg, Nightly    Trulicity 4.5 mg, subcutaneous, Weekly    varenicline tartrate (CHANTIX) 1 mg, oral, 2 times daily, Take with full glass of water        [facility] [place] [facility] [address]  [address] [id number]  Phone: [phone] Fax: [phone]    [place], [place] - [address]  [address]  [address]  [facility] [address]  Phone: [phone] Fax: [phone]    [facility]  [address], [address]  [facility] [address]  Phone: [phone] Fax: [phone]      DRUG INTERACTIONS  None requiring intervention at this time    Assessment/Plan   Problem List Items Addressed This Visit       Diabetes (Multi)    Relevant Medications    metFORMIN XR (Glucophage-XR) 500 mg 24 hr tablet    Other Relevant Orders    Referral to Clinical Pharmacy    Nicotine  dependence, unspecified, uncomplicated    Relevant Orders    Referral to Clinical Pharmacy       Patient identified self-management goal:  BG control, Patients diabetes is poorly controlled, needs to quit smoking, and needs to follow diet more regularly   Patient's goal A1c is < 7%.  Is pt at goal? No, 10.9% as of 10/31/24    Patient's SMBGs are significantly elevated  Complications: Obesity, neuropathy, smoker, alcohol dependence, CKD  Rationale for plan: Patient's current blood sugar significantly improved with addition of metformin ER 500mg BID but still experiences high PPBG. No side effects with Trulicity and metformin ER and BG have improved. Will plan to continue Trulicity and Farxiga and increase metformin ER for better BG control with additional monitoring.   Medication Changes:  CONTINUE:  Farxiga 10mg daily  Trulicity 4.5mg weekly on 12/22-  INCREASE:  Metformin ER 500mg - 2 tabs QAM and 1 tab QPM with food  Plan to increase to 2 tabs BID if tolerated and if BG is still elevated  Compliance at present is estimated to be excellent. Efforts to improve compliance (if necessary) will be directed at dietary modifications: decreased carbohydrates, increased protein/vegetables/fiber, increased exercise, regular blood sugar monitoring: 3-4 times daily, and smoking cessation .  [employer] invitation was sent to patient to connect  Smoking cessation. Smoking has increased due to current stress after losing her job.   Continue Chantix 1mg twice daily  Start nicotine 7 mg patch every 24 hours when ready  Education Provided to Patient: 15-15, smoking cessation, Libreview, diet and exercise     Pharm Follow-up: 2/7 @10 am     Binta Jimenez PharmD   Clinical Pharmacy Specialist  Phone: [phone]  Fax: [phone]    Continue all meds under the continuation of care with the referring provider and clinical pharmacy team.    Verbal consent to manage patient's drug therapy was obtained from the patient. They were  informed they may decline to participate or withdraw from participation in pharmacy services at any time.

## 2025-01-24 NOTE — Clinical Note
Patient's BG has improved with addition of metformin ER which we will continue to increase. She has not start nicotine patch yet but will start when ready. Thanks! Binta Jimenez, PharmD

## 2025-01-28 DIAGNOSIS — N89.8 VAGINAL DISCHARGE: ICD-10-CM

## 2025-01-28 DIAGNOSIS — N76.0 RECURRENT VAGINITIS: ICD-10-CM

## 2025-01-28 RX ORDER — TERCONAZOLE 8 MG/G
1 CREAM VAGINAL NIGHTLY
Qty: 0.3 G | Refills: 1 | Status: SHIPPED | OUTPATIENT
Start: 2025-01-28 | End: 2025-01-30

## 2025-01-30 RX ORDER — TERCONAZOLE 8 MG/G
1 CREAM VAGINAL NIGHTLY
Qty: 0.3 G | Refills: 0 | Status: SHIPPED | OUTPATIENT
Start: 2025-01-30 | End: 2025-02-02

## 2025-02-07 ENCOUNTER — APPOINTMENT (OUTPATIENT)
Dept: PHARMACY | Facility: HOSPITAL | Age: 60
End: 2025-02-07
Payer: COMMERCIAL

## 2025-02-11 ENCOUNTER — OFFICE VISIT (OUTPATIENT)
Dept: PRIMARY CARE | Facility: CLINIC | Age: 60
End: 2025-02-11
Payer: COMMERCIAL

## 2025-02-11 VITALS
DIASTOLIC BLOOD PRESSURE: 84 MMHG | TEMPERATURE: 96.9 F | HEART RATE: 73 BPM | BODY MASS INDEX: 33.28 KG/M2 | OXYGEN SATURATION: 97 % | HEIGHT: 65 IN | SYSTOLIC BLOOD PRESSURE: 124 MMHG

## 2025-02-11 DIAGNOSIS — K21.9 GASTROESOPHAGEAL REFLUX DISEASE, UNSPECIFIED WHETHER ESOPHAGITIS PRESENT: ICD-10-CM

## 2025-02-11 DIAGNOSIS — E11.65 TYPE 2 DIABETES MELLITUS WITH HYPERGLYCEMIA, WITHOUT LONG-TERM CURRENT USE OF INSULIN: ICD-10-CM

## 2025-02-11 DIAGNOSIS — E11.69 TYPE 2 DIABETES MELLITUS WITH OTHER SPECIFIED COMPLICATION, WITH LONG-TERM CURRENT USE OF INSULIN: Primary | ICD-10-CM

## 2025-02-11 DIAGNOSIS — Z79.4 TYPE 2 DIABETES MELLITUS WITH OTHER SPECIFIED COMPLICATION, WITH LONG-TERM CURRENT USE OF INSULIN: Primary | ICD-10-CM

## 2025-02-11 DIAGNOSIS — E55.9 VITAMIN D DEFICIENCY: ICD-10-CM

## 2025-02-11 DIAGNOSIS — E78.5 HYPERLIPIDEMIA, UNSPECIFIED HYPERLIPIDEMIA TYPE: ICD-10-CM

## 2025-02-11 DIAGNOSIS — I10 PRIMARY HYPERTENSION: ICD-10-CM

## 2025-02-11 DIAGNOSIS — M54.12 CERVICAL RADICULOPATHY: ICD-10-CM

## 2025-02-11 LAB — POC HEMOGLOBIN A1C: 8.6 % (ref 4.2–6.5)

## 2025-02-11 PROCEDURE — 83036 HEMOGLOBIN GLYCOSYLATED A1C: CPT | Performed by: INTERNAL MEDICINE

## 2025-02-11 PROCEDURE — 3079F DIAST BP 80-89 MM HG: CPT | Performed by: INTERNAL MEDICINE

## 2025-02-11 PROCEDURE — 99204 OFFICE O/P NEW MOD 45 MIN: CPT | Performed by: INTERNAL MEDICINE

## 2025-02-11 PROCEDURE — 3074F SYST BP LT 130 MM HG: CPT | Performed by: INTERNAL MEDICINE

## 2025-02-11 PROCEDURE — 99214 OFFICE O/P EST MOD 30 MIN: CPT | Performed by: INTERNAL MEDICINE

## 2025-02-11 RX ORDER — DAPAGLIFLOZIN 10 MG/1
10 TABLET, FILM COATED ORAL DAILY
Qty: 90 TABLET | Refills: 1 | Status: SHIPPED | OUTPATIENT
Start: 2025-02-11

## 2025-02-11 RX ORDER — ATORVASTATIN CALCIUM 40 MG/1
40 TABLET, FILM COATED ORAL DAILY
Qty: 90 TABLET | Refills: 1 | Status: SHIPPED | OUTPATIENT
Start: 2025-02-11

## 2025-02-11 RX ORDER — DULAGLUTIDE 4.5 MG/.5ML
4.5 INJECTION, SOLUTION SUBCUTANEOUS WEEKLY
Qty: 2 ML | Refills: 3 | Status: SHIPPED | OUTPATIENT
Start: 2025-02-11

## 2025-02-11 RX ORDER — FLASH GLUCOSE SENSOR
KIT MISCELLANEOUS
Qty: 6 EACH | Refills: 1 | Status: SHIPPED | OUTPATIENT
Start: 2025-02-11

## 2025-02-11 RX ORDER — AMLODIPINE BESYLATE 10 MG/1
10 TABLET ORAL DAILY
Qty: 90 TABLET | Refills: 1 | Status: SHIPPED | OUTPATIENT
Start: 2025-02-11

## 2025-02-11 RX ORDER — CYCLOBENZAPRINE HCL 10 MG
10 TABLET ORAL EVERY 8 HOURS PRN
Qty: 90 TABLET | Refills: 0 | Status: SHIPPED | OUTPATIENT
Start: 2025-02-11

## 2025-02-11 RX ORDER — CYCLOBENZAPRINE HCL 10 MG
10 TABLET ORAL EVERY 8 HOURS PRN
Qty: 90 TABLET | Refills: 1 | Status: CANCELLED | OUTPATIENT
Start: 2025-02-11

## 2025-02-11 RX ORDER — ERGOCALCIFEROL 1.25 MG/1
50000 CAPSULE ORAL WEEKLY
Qty: 12 CAPSULE | Refills: 3 | Status: SHIPPED | OUTPATIENT
Start: 2025-02-11 | End: 2026-01-13

## 2025-02-11 RX ORDER — CYCLOBENZAPRINE HCL 10 MG
10 TABLET ORAL EVERY 8 HOURS PRN
Qty: 90 TABLET | Refills: 0 | Status: SHIPPED | OUTPATIENT
Start: 2025-02-11 | End: 2025-02-11

## 2025-02-11 RX ORDER — METOPROLOL SUCCINATE 50 MG/1
50 TABLET, EXTENDED RELEASE ORAL DAILY
Qty: 90 TABLET | Refills: 1 | Status: SHIPPED | OUTPATIENT
Start: 2025-02-11

## 2025-02-11 RX ORDER — METFORMIN HYDROCHLORIDE 500 MG/1
1000 TABLET, EXTENDED RELEASE ORAL 2 TIMES DAILY
Qty: 120 TABLET | Refills: 0 | Status: SHIPPED | OUTPATIENT
Start: 2025-02-11

## 2025-02-11 RX ORDER — GABAPENTIN 300 MG/1
300 CAPSULE ORAL NIGHTLY
Qty: 30 CAPSULE | Refills: 1 | Status: SHIPPED | OUTPATIENT
Start: 2025-02-11 | End: 2025-04-12

## 2025-02-11 RX ORDER — OMEPRAZOLE 20 MG/1
20 CAPSULE, DELAYED RELEASE ORAL
Qty: 90 CAPSULE | Refills: 1 | Status: SHIPPED | OUTPATIENT
Start: 2025-02-11

## 2025-02-11 RX ORDER — GABAPENTIN 300 MG/1
300 CAPSULE ORAL NIGHTLY
Qty: 30 CAPSULE | Refills: 1 | Status: SHIPPED | OUTPATIENT
Start: 2025-02-11 | End: 2025-02-11

## 2025-02-11 ASSESSMENT — PAIN SCALES - GENERAL: PAINLEVEL_OUTOF10: 3

## 2025-02-11 ASSESSMENT — ENCOUNTER SYMPTOMS
DEPRESSION: 0
OCCASIONAL FEELINGS OF UNSTEADINESS: 0
LOSS OF SENSATION IN FEET: 0

## 2025-02-11 ASSESSMENT — COLUMBIA-SUICIDE SEVERITY RATING SCALE - C-SSRS
6. HAVE YOU EVER DONE ANYTHING, STARTED TO DO ANYTHING, OR PREPARED TO DO ANYTHING TO END YOUR LIFE?: NO
1. IN THE PAST MONTH, HAVE YOU WISHED YOU WERE DEAD OR WISHED YOU COULD GO TO SLEEP AND NOT WAKE UP?: NO
2. HAVE YOU ACTUALLY HAD ANY THOUGHTS OF KILLING YOURSELF?: NO

## 2025-02-11 NOTE — PROGRESS NOTES
"Subjective   Patient ID: Linnette Rm is a 59 y.o. female who presents for Establish Care.    HPI      PMH remarkable for diabetes, HLD, HTN, CKD stage 2, depression and nicotine dependence     H/O diabetes mellitus- her last A1c was 10.9 in 2024, FBS range from 120-200  Deneid any hypoglycemic episodes    H/O tobacco dependence- smokes 2-3 cigarettes a day since last efw months    Left upper arm pain since 2024, she saw orthopedics, who ordered US of bicpes tendon, has left sided neck and shoulder blade pain  Took predniosne and flexeril with no releif  Not sure what started her pain    CKD- sees nephrologist    Review of Systems    Objective   /84 (BP Location: Left arm, Patient Position: Sitting, BP Cuff Size: Large adult)   Pulse 73   Temp 36.1 °C (96.9 °F) (Temporal)   Ht 1.651 m (5' 5\")   LMP  (LMP Unknown)   SpO2 97%   BMI 33.28 kg/m²     Physical Exam    Assessment/Plan   {Assess/PlanSmartLinks:70096}      She would like to establish bwith one closer to her hosue in enar future    Past Medical History:   Diagnosis Date    Acute vaginitis 2018    BV (bacterial vaginosis)    History of uterine scar from previous surgery 2016    History of 2  sections    Pain in left knee 2020    Knee pain, left    Patellofemoral disorders, left knee 2019    Patellofemoral syndrome, left    Personal history of other diseases of the female genital tract 2018    History of vaginal discharge    Personal history of other specified conditions 2021    History of fatigue    Pityriasis versicolor 2017    Tinea versicolor    Superficial mycosis, unspecified 2016    Fungal dermatitis       Past Surgical History:   Procedure Laterality Date     SECTION, CLASSIC  2016     Section    MR HEAD ANGIO WO IV CONTRAST  2020    MR HEAD ANGIO WO IV CONTRAST 2020 [facility] [place] [facility]    MR NECK ANGIO WO IV CONTRAST  2020 "    MR NECK ANGIO WO IV CONTRAST 2/12/2020 [facility] [place] [facility]           [facility]  Outside Information  Results  CT cervical spine wo IV contrast (Order [id number])     CT cervical spine wo IV contrast  Order: [id number]  Impression    IMPRESSION:    1.  No acute intracranial findings.  2.  No acute fracture of the cervical spine.  3.  No acute fracture of the thoracic spine.  4.  No acute fracture of the lumbar spine.        Transcribed Using Voice Recognition  Transcribe Date/Time: Nov 22 2024  3:17P    Dictated by: SYD ESCOBAR MD    This examination was interpreted and the report reviewed and  electronically signed by:  SYD ESCOBAR MD on Nov 22 2024  4:03PM  EST  Narrative    * * *Final Report* * *    DATE OF EXAM: Nov 22 2024  3:04PM      [facility]   0505  -  CT CERVICAL SPINE WO IVCON  / ACCESSION #  [id number]    PROCEDURE REASON: Spine fracture, cervical, traumatic        * * * * Physician Interpretation * * * *    RESULT: EXAMINATION: CT BRAIN WO IVCON, CT CERVICAL SPINE WO IVCON, CT  LUMBAR SPINE WO IVCON, CT THORACIC SPINE WO IVCON    CLINICAL HISTORY: Fall.    TECHNIQUE:  Serial axial images without IV contrast were obtained from  the vertex to the sacrum with sagittal and coronal planar reconstructions.    CT Radiation dose: Integrated Dose-Length Product (DLP) for this visit =  2733  mGy*cm  CT Dose Reduction Employed: Automated exposure control(AEC) and iterative  recon    COMPARISON:    Partial comparison to CT dated 05/03/2012.    FINDINGS:    There is no abnormal extra-axial fluid, intracranial hemorrhage, mass,  mass effect, or midline shift.  The ventricular system is unremarkable.    Cisterns are patent.  Gray-white differentiation is maintained.  Mild  right greater than left deep white matter hypodensities.  Paranasal  sinuses and mastoid air cells are unopacified.  Calvarium is intact.    There is no prevertebral soft tissue edema.    The atlantooccipital and atlantoaxial articulations are  maintained.    No significant listhesis.    Vertebral body heights are maintained.    Mild multilevel loss of disc height most pronounced C5-C6 with associated  minimal endplate remodeling.    Facet alignment is maintained.    The posterior elements are intact.    Partially visualized sacrum and sacroiliac joints are maintained.  Exam End: 11/22/24 15:04    Specimen Collected: 11/22/24 15:04 Last Resulted: 11/22/24 16:05   Received From: [facility]  Result Received: 12/05/24 10:02        [facility]  Outside Information  Results  CT thoracic spine wo IV contrast (Order [id number])     CT thoracic spine wo IV contrast  Order: [id number]  Impression    IMPRESSION:    1.  No acute intracranial findings.  2.  No acute fracture of the cervical spine.  3.  No acute fracture of the thoracic spine.  4.  No acute fracture of the lumbar spine.        Transcribed Using Voice Recognition  Transcribe Date/Time: Nov 22 2024  3:17P    Dictated by: SYD ESCOBAR MD    This examination was interpreted and the report reviewed and  electronically signed by:  SYD ESCOBAR MD on Nov 22 2024  4:03PM  EST  Narrative    * * *Final Report* * *    DATE OF EXAM: Nov 22 2024  3:04PM      [facility]   0514  -  CT THORACIC SPINE WO IVCON  / ACCESSION #  [id number]    PROCEDURE REASON: Spine fracture, thoracic, traumatic        * * * * Physician Interpretation * * * *    RESULT: EXAMINATION: CT BRAIN WO IVCON, CT CERVICAL SPINE WO IVCON, CT  LUMBAR SPINE WO IVCON, CT THORACIC SPINE WO IVCON    CLINICAL HISTORY: Fall.    TECHNIQUE:  Serial axial images without IV contrast were obtained from  the vertex to the sacrum with sagittal and coronal planar reconstructions.    CT Radiation dose: Integrated Dose-Length Product (DLP) for this visit =  2733  mGy*cm  CT Dose Reduction Employed: Automated exposure control(AEC) and iterative  recon    COMPARISON:    Partial comparison to CT dated 05/03/2012.    FINDINGS:    There is no abnormal extra-axial  fluid, intracranial hemorrhage, mass,  mass effect, or midline shift.  The ventricular system is unremarkable.    Cisterns are patent.  Gray-white differentiation is maintained.  Mild  right greater than left deep white matter hypodensities.  Paranasal  sinuses and mastoid air cells are unopacified.  Calvarium is intact.    There is no prevertebral soft tissue edema.    The atlantooccipital and atlantoaxial articulations are maintained.    No significant listhesis.    Vertebral body heights are maintained.    Mild multilevel loss of disc height most pronounced C5-C6 with associated  minimal endplate remodeling.    Facet alignment is maintained.    The posterior elements are intact.    Partially visualized sacrum and sacroiliac joints are maintained.  Exam End: 11/22/24 15:04    Specimen Collected: 11/22/24 15:04 Last Resulted: 11/22/24 16:05   Received From: [facility]  Result Received: 12/05/24 10:02         Lab Results   Component Value Date    HGBA1C 8.6 (A) 02/11/2025        fracture of the thoracic spine.  4.  No acute fracture of the lumbar spine.        Transcribed Using Voice Recognition  Transcribe Date/Time: Nov 22 2024  3:17P    Dictated by: SYD ESCOBAR MD    This examination was interpreted and the report reviewed and  electronically signed by:  SYD ESCOBAR MD on Nov 22 2024  4:03PM  EST  Narrative    * * *Final Report* * *    DATE OF EXAM: Nov 22 2024  3:04PM      [facility]   0514  -  CT THORACIC SPINE WO IVCON  / ACCESSION #  [id number]    PROCEDURE REASON: Spine fracture, thoracic, traumatic        * * * * Physician Interpretation * * * *    RESULT: EXAMINATION: CT BRAIN WO IVCON, CT CERVICAL SPINE WO IVCON, CT  LUMBAR SPINE WO IVCON, CT THORACIC SPINE WO IVCON    CLINICAL HISTORY: Fall.    TECHNIQUE:  Serial axial images without IV contrast were obtained from  the vertex to the sacrum with sagittal and coronal planar reconstructions.    CT Radiation dose: Integrated Dose-Length Product (DLP) for this visit =  2733  mGy*cm  CT Dose Reduction Employed: Automated exposure control(AEC) and iterative  recon    COMPARISON:    Partial comparison to CT dated 05/03/2012.    FINDINGS:    There is no abnormal extra-axial fluid, intracranial hemorrhage, mass,  mass effect, or midline shift.  The ventricular system is unremarkable.    Cisterns are patent.  Gray-white differentiation is maintained.  Mild  right greater than left deep white matter hypodensities.  Paranasal  sinuses and mastoid air cells are unopacified.  Calvarium is intact.    There is no prevertebral soft tissue edema.    The atlantooccipital and atlantoaxial articulations are maintained.    No significant listhesis.    Vertebral body heights are maintained.    Mild multilevel loss of disc height most pronounced C5-C6 with associated  minimal endplate remodeling.    Facet alignment is maintained.    The posterior elements are intact.    Partially visualized sacrum and sacroiliac joints are maintained.  Exam End: 11/22/24  15:04    Specimen Collected: 11/22/24 15:04 Last Resulted: 11/22/24 16:05   Received From: [facility]  Result Received: 12/05/24 10:02         Lab Results   Component Value Date    HGBA1C 8.6 (A) 02/11/2025       Current Outpatient Medications   Medication Instructions    amLODIPine (NORVASC) 10 mg, oral, Daily    atorvastatin (LIPITOR) 40 mg, oral, Daily    blood sugar diagnostic (OneTouch Ultra Test) strip USE TO TEST BLOOD SUGAR 4 TIMES DAILY    buPROPion XL (Wellbutrin XL) 150 mg 24 hr tablet 1 tablet, Daily    cyclobenzaprine (FLEXERIL) 10 mg, oral, Every 8 hours PRN    dapagliflozin propanediol (FARXIGA) 10 mg, oral, Daily    dulaglutide (TRULICITY) 4.5 mg, subcutaneous, Weekly    ergocalciferol (VITAMIN D-2) 50,000 Units, oral, Weekly    flash glucose sensor kit (FreeStyle Nanci 2 Sensor) kit USE AS DIRECTED TO TEST BLOOD GLUCOSE, CHANGE EVERY 14 DAYS    gabapentin (NEURONTIN) 300 mg, oral, Nightly    metFORMIN XR (GLUCOPHAGE-XR) 1,000 mg, oral, 2 times daily, Take with food. Do not crush, chew, or split.    metoprolol succinate XL (TOPROL-XL) 50 mg, oral, Daily    mv-min/iron/folic/calcium/vitK (WOMEN'S MULTIVITAMIN ORAL) 1 tablet, Daily    nicotine (Nicoderm CQ) 7 mg/24 hr patch 1 patch, transdermal, Every 24 hours    omeprazole (PRILOSEC) 20 mg, oral, Daily before breakfast    prazosin (MINIPRESS) 1 mg, Nightly    traZODone (DESYREL) 200 mg, Nightly    varenicline tartrate (CHANTIX) 1 mg, oral, 2 times daily, Take with full glass of water

## 2025-02-13 ENCOUNTER — APPOINTMENT (OUTPATIENT)
Dept: PHARMACY | Facility: HOSPITAL | Age: 60
End: 2025-02-13
Payer: COMMERCIAL

## 2025-02-13 NOTE — PROGRESS NOTES
Patient ID: Linnette Rm is a 59 y.o. female who presents for No chief complaint on file..    PCP/Referring Provider: Silvio Carr MD - last visit: 5/23/24, next visit: 2/20/25    Subjective   No Known Allergies  HPI    Interval History: 2 years ago    Diabetes Mellitus Type 2  Known complications due to diabetes included chronic kidney disease and obesity    Current Diabetes Medications:   Farxiga 10mg daily  Trulicity 4.5mg weekly on Sun  No side effects  No decrease in appetite  Metformin ER 500mg - 2 tabs QAM and 1 tab QPM with food  No noticeable side effects    Historical Diabetes Medications:  -medication, duration of therapy, reason discontinued:  lantus 8 units once daily (at night, maybe takes it 3-4 times per week)   humalog 6 units three times daily (patient takes it if she has starch in a meal, or if she has a big meal; she takes it maybe 2 times per week)   Metformin (afraid of side effects)    SMBG Measurements  Patient is using: both glucometer and continuous glucose monitor  The patient is currently checking the blood glucose >4 times per day.  Hypoglycemia  Patient reports none.  Hypoglycemia frequency: none  Hypoglycemia awareness: No   Reports 0 episodes of hypoglycemia for a while  Hyperglycemia  Patient reports excessive thirst, fatigue, polyuria, weakness, and nausea .  FBG (avg): 110s  highest 197  PPBG (avg): 250-260s  Bedtime BG (avg): 200s    Diet: Eats 2 times throughout the day. Eats what she wants and a lot carbohydrates/starches lately  Breakfast: skips often  Brunch: hebert, eggs, ham, leftovers  Dinner: chicken, fish, takeout, fast food  Snacks: all day on chips, cookies, candy  Fluids: improved on water intake (4-8 oz of water per day), still drinks Pepsi (>12 oz x4 per day)    Physical Activity   None, mostly walking to and at work (20 minutes)     Pertinent PMH Review:  PMH of Pancreatitis: No  PMH of Retinopathy: No  PMH of Urinary Tract Infections: Yes,  Diabetes currently. Frequently lately due to high BG.   PMH of MTC: No    Primary Prevention  The 10-year ASCVD risk score (Sandor MCNAIR, et al., 2019) is: 20.6%    Values used to calculate the score:      Age: 59 years      Sex: Female      Is Non- : Yes      Diabetic: Yes      Tobacco smoker: Yes      Systolic Blood Pressure: 124 mmHg      Is BP treated: Yes      HDL Cholesterol: 49.9 mg/dL      Total Cholesterol: 169 mg/dL    Statin? Yes - atorvastatin 40mg  ACE-I/ARB? No  Aspirin? No  Last eye exam: 2024  Last diabetic foot exam: >1 year  Has 0 sores/cuts on feet today    TOBACCO  Tobacco Use:  Patient currently reports usin cigarettes/day. Has not increased or decreased lately. Hasn't started nicotine 7mg patch daily due to daily stress. Sometimes smokes within or after 30 min of waking up.   Current treatment: Chantix 1mg daily  Denies any vivid dreams  Historical: felt that nicotine patches were helpful but still hasn't used yet    AFFORDABILITY/ADHERENCE   [cost or adherence barriers]  Trulicity had been on back order but is available now    Review of Systems    Objective   LMP  (LMP Unknown)    BP Readings from Last 4 Encounters:   25 124/84   24 130/72   24 136/80   24 130/80      There were no vitals filed for this visit.     LAB  Lab Results   Component Value Date    BILITOT 0.3 10/31/2024    CALCIUM 9.8 10/31/2024    CO2 27 10/31/2024     10/31/2024    CREATININE 1.22 (H) 10/31/2024    GLUCOSE 254 (H) 10/31/2024    ALKPHOS 133 (H) 10/31/2024    K 4.7 10/31/2024    PROT 7.8 10/31/2024     10/31/2024    AST 15 10/31/2024    ALT 20 10/31/2024    BUN 21 10/31/2024    ANIONGAP 16 10/31/2024    MG 2.07 10/31/2024    ALBUMIN 4.3 10/31/2024    GFRF 48 (A) 2023     Lab Results   Component Value Date    TRIG 184 (H) 10/31/2024    CHOL 169 10/31/2024    LDLCALC 82 10/31/2024    HDL 49.9 10/31/2024     Lab Results   Component Value Date    HGBA1C 8.6 (A)  02/11/2025     Current Outpatient Medications   Medication Instructions    amLODIPine (NORVASC) 10 mg, oral, Daily    atorvastatin (LIPITOR) 40 mg, oral, Daily    blood sugar diagnostic (OneTouch Ultra Test) strip USE TO TEST BLOOD SUGAR 4 TIMES DAILY    buPROPion XL (Wellbutrin XL) 150 mg 24 hr tablet 1 tablet, Daily    cyclobenzaprine (FLEXERIL) 10 mg, oral, Every 8 hours PRN    dapagliflozin propanediol (FARXIGA) 10 mg, oral, Daily    ergocalciferol (VITAMIN D-2) 50,000 Units, oral, Weekly    flash glucose sensor kit (FreeStyle Nanci 2 Sensor) kit USE AS DIRECTED TO TEST BLOOD GLUCOSE, CHANGE EVERY 14 DAYS    gabapentin (NEURONTIN) 300 mg, oral, Nightly    metFORMIN XR (GLUCOPHAGE-XR) 1,000 mg, oral, 2 times daily, Take with food. Do not crush, chew, or split.    metoprolol succinate XL (TOPROL-XL) 50 mg, oral, Daily    mv-min/iron/folic/calcium/vitK (WOMEN'S MULTIVITAMIN ORAL) 1 tablet, Daily    nicotine (Nicoderm CQ) 7 mg/24 hr patch 1 patch, transdermal, Every 24 hours    omeprazole (PRILOSEC) 20 mg, oral, Daily before breakfast    prazosin (MINIPRESS) 1 mg, Nightly    traZODone (DESYREL) 200 mg, Nightly    Trulicity 4.5 mg, subcutaneous, Weekly    varenicline tartrate (CHANTIX) 1 mg, oral, 2 times daily, Take with full glass of water        [facility] [place] [facility] [address]  [address] [id number]  Phone: [phone] Fax: [phone]    [place], [place] - [address]  [address]  [address]  [facility] [address]  Phone: [phone] Fax: [phone]    [facility]  [address]  [facility] [address]  Phone: [phone] Fax: [phone]    [facility]/pharmacy #[id number] - [place], [place] - [address]  [address] [facility] [address] [facility] [address] [address]  Phone: [phone] Fax: [phone]      DRUG INTERACTIONS  None requiring intervention at this time    Assessment/Plan   Problem List Items Addressed  This Visit    None        Patient identified self-management goal:  BG control, Patients diabetes is poorly controlled, needs to quit smoking, and needs to follow diet more regularly   Patient's goal A1c is < 7%.  Is pt at goal? No, 10.9% as of 10/31/24    Patient's SMBGs are significantly elevated  Complications: Obesity, neuropathy, smoker, alcohol dependence, CKD  Rationale for plan: Patient's current blood sugar significantly improved with addition of metformin ER 500mg BID but still experiences high PPBG. No side effects with Trulicity and metformin ER and BG have improved. Will plan to continue Trulicity and Farxiga and increase metformin ER for better BG control with additional monitoring.   Medication Changes:  CONTINUE:  Farxiga 10mg daily  Trulicity 4.5mg weekly on 12/22-  INCREASE:  Metformin ER 500mg - 2 tabs QAM and 1 tab QPM with food  Plan to increase to 2 tabs BID if tolerated and if BG is still elevated  Compliance at present is estimated to be excellent. Efforts to improve compliance (if necessary) will be directed at dietary modifications: decreased carbohydrates, increased protein/vegetables/fiber, increased exercise, regular blood sugar monitoring: 3-4 times daily, and smoking cessation .  Wami invitation was sent to patient to connect  Smoking cessation. Smoking has increased due to current stress after losing her job.   Continue Chantix 1mg twice daily  Start nicotine 7 mg patch every 24 hours when ready  Education Provided to Patient: 15-15, smoking cessation, Libreview, diet and exercise     Pharm Follow-up: 2/7 @10 am     Binta Jimenez PharmD   Clinical Pharmacy Specialist  Phone: [phone]  Fax: [phone]    Continue all meds under the continuation of care with the referring provider and clinical pharmacy team.    Verbal consent to manage patient's drug therapy was obtained from the patient. They were informed they may decline to participate or withdraw from  participation in pharmacy services at any time.

## 2025-02-17 ASSESSMENT — ENCOUNTER SYMPTOMS
TREMORS: 0
TROUBLE SWALLOWING: 0
SINUS PRESSURE: 0
COUGH: 0
FATIGUE: 0
SEIZURES: 0
FREQUENCY: 0
WHEEZING: 0
BLOOD IN STOOL: 0
NAUSEA: 0
NUMBNESS: 0
PALPITATIONS: 0
SHORTNESS OF BREATH: 0
BACK PAIN: 0
DYSURIA: 0
ARTHRALGIAS: 1
VOMITING: 0
MYALGIAS: 0
UNEXPECTED WEIGHT CHANGE: 0
CHILLS: 0
ABDOMINAL PAIN: 0

## 2025-02-19 DIAGNOSIS — F17.200 NICOTINE DEPENDENCE, UNCOMPLICATED, UNSPECIFIED NICOTINE PRODUCT TYPE: ICD-10-CM

## 2025-02-20 ENCOUNTER — APPOINTMENT (OUTPATIENT)
Dept: PRIMARY CARE | Facility: CLINIC | Age: 60
End: 2025-02-20
Payer: COMMERCIAL

## 2025-02-24 RX ORDER — VARENICLINE TARTRATE 1 MG/1
1 TABLET, FILM COATED ORAL 2 TIMES DAILY
Qty: 60 TABLET | Refills: 5 | Status: SHIPPED | OUTPATIENT
Start: 2025-02-24

## 2025-02-25 ENCOUNTER — APPOINTMENT (OUTPATIENT)
Dept: PHARMACY | Facility: HOSPITAL | Age: 60
End: 2025-02-25
Payer: COMMERCIAL

## 2025-02-25 DIAGNOSIS — F17.210 CIGARETTE NICOTINE DEPENDENCE WITHOUT COMPLICATION: ICD-10-CM

## 2025-02-25 DIAGNOSIS — E11.65 TYPE 2 DIABETES MELLITUS WITH HYPERGLYCEMIA, WITHOUT LONG-TERM CURRENT USE OF INSULIN: ICD-10-CM

## 2025-02-25 DIAGNOSIS — F17.200 NICOTINE DEPENDENCE, UNCOMPLICATED, UNSPECIFIED NICOTINE PRODUCT TYPE: ICD-10-CM

## 2025-02-25 RX ORDER — BLOOD-GLUCOSE SENSOR
EACH MISCELLANEOUS
Qty: 6 EACH | Refills: 3 | Status: SHIPPED | OUTPATIENT
Start: 2025-02-25

## 2025-02-25 RX ORDER — METFORMIN HYDROCHLORIDE 500 MG/1
TABLET, EXTENDED RELEASE ORAL
Qty: 90 TABLET | Refills: 0 | Status: SHIPPED | OUTPATIENT
Start: 2025-02-25

## 2025-02-25 NOTE — PROGRESS NOTES
Patient ID: Linnette Rm is a 59 y.o. female who presents for Diabetes.    PCP/Referring Provider: Silvio Carr MD - last visit: 5/23/24, next visit: 2/20/25. 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 She is not sure if she will be establishing with her yet due to distance    Subjective   No Known Allergies  HPI    Interval History: 2 years ago    Diabetes Mellitus Type 2  Known complications due to diabetes included chronic kidney disease and obesity    Current Diabetes Medications:   Farxiga 10mg daily  Trulicity 4.5mg weekly on Sun  No side effects  No decrease in appetite  Metformin ER 500mg BID with food  No noticeable side effects    Historical Diabetes Medications:  -medication, duration of therapy, reason discontinued:  lantus 8 units once daily (at night, maybe takes it 3-4 times per week)   humalog 6 units three times daily (patient takes it if she has starch in a meal, or if she has a big meal; she takes it maybe 2 times per week)   Metformin (afraid of side effects)    SMBG Measurements  Patient is using: both glucometer and continuous glucose monitor Freestyle Nanci 2  The patient is currently checking the blood glucose >4 times per day.  Hypoglycemia  Patient reports none.  Hypoglycemia frequency: none  Hypoglycemia awareness: No   Reports 0 episodes of hypoglycemia for a while  Hyperglycemia  Patient reports excessive thirst, fatigue, polyuria, weakness, and nausea .  FBG (avg): 120s  highest 160  PPBG (avg): 180  highest   Bedtime BG (avg): unknown, previously 200s    Diet: Eats 2 times throughout the day. Eats what she wants and a lot carbohydrates/starches lately  Breakfast: skips often  Brunch: hebert, eggs, ham, leftovers  Dinner: chicken, fish, takeout, fast food  Snacks: all day on chips, cookies, candy  Fluids: improved on water intake (several large cups oz of water per day), still drinks Pepsi 24 oz x1 per  day)    Physical Activity   None, mostly walking to and at work (20 minutes)     Pertinent PMH Review:  PMH of Pancreatitis: No  PMH of Retinopathy: No  PMH of Urinary Tract Infections: Yes, currently. Frequently lately due to high BG.   PMH of MTC: No    Primary Prevention  The 10-year ASCVD risk score (Sandor MCNAIR, et al., 2019) is: 20.6%    Values used to calculate the score:      Age: 59 years      Sex: Female      Is Non- : Yes      Diabetic: Yes      Tobacco smoker: Yes      Systolic Blood Pressure: 124 mmHg      Is BP treated: Yes      HDL Cholesterol: 49.9 mg/dL      Total Cholesterol: 169 mg/dL    Statin? Yes - atorvastatin 40mg  ACE-I/ARB? No  Aspirin? No  Last eye exam: 2024  Last diabetic foot exam: >1 year  Has 0 sores/cuts on feet today    TOBACCO  Tobacco Use:  Patient currently reports usin cigarettes/day. When she works, she only smokes 1 cig. Used Nicotine 7mg patch a few days but hasn't paid attention if it works. Sometimes smokes within or after 30 min of waking up.   Current treatment: Chantix 1mg daily  Denies any vivid dreams  Notices that when she's busy, she smokes less. Typically just smokes when she is home    AFFORDABILITY/ADHERENCE   [cost or adherence barriers]  Trulicity had been on back order but is available now    Review of Systems    Objective   LMP  (LMP Unknown)    BP Readings from Last 4 Encounters:   25 124/84   24 130/72   24 136/80   24 130/80      There were no vitals filed for this visit.     LAB  Lab Results   Component Value Date    BILITOT 0.3 10/31/2024    CALCIUM 9.8 10/31/2024    CO2 27 10/31/2024     10/31/2024    CREATININE 1.22 (H) 10/31/2024    GLUCOSE 254 (H) 10/31/2024    ALKPHOS 133 (H) 10/31/2024    K 4.7 10/31/2024    PROT 7.8 10/31/2024     10/31/2024    AST 15 10/31/2024    ALT 20 10/31/2024    BUN 21 10/31/2024    ANIONGAP 16 10/31/2024    MG 2.07 10/31/2024    ALBUMIN 4.3 10/31/2024    GFRF  48 (A) 08/17/2023     Lab Results   Component Value Date    TRIG 184 (H) 10/31/2024    CHOL 169 10/31/2024    LDLCALC 82 10/31/2024    HDL 49.9 10/31/2024     Lab Results   Component Value Date    HGBA1C 8.6 (A) 02/11/2025     Current Outpatient Medications   Medication Instructions    amLODIPine (NORVASC) 10 mg, oral, Daily    atorvastatin (LIPITOR) 40 mg, oral, Daily    blood sugar diagnostic (OneTouch Ultra Test) strip USE TO TEST BLOOD SUGAR 4 TIMES DAILY    blood-glucose sensor (FreeStyle Nanci 2 Plus Sensor) device Used as instructed to check blood sugar and change every 15 days    buPROPion XL (Wellbutrin XL) 150 mg 24 hr tablet 1 tablet, Daily    cyclobenzaprine (FLEXERIL) 10 mg, oral, Every 8 hours PRN    dapagliflozin propanediol (FARXIGA) 10 mg, oral, Daily    dulaglutide (TRULICITY) 4.5 mg, subcutaneous, Weekly    ergocalciferol (VITAMIN D-2) 50,000 Units, oral, Weekly    flash glucose sensor kit (FreeStyle Nanci 2 Sensor) kit USE AS DIRECTED TO TEST BLOOD GLUCOSE, CHANGE EVERY 14 DAYS    gabapentin (NEURONTIN) 300 mg, oral, Nightly    metFORMIN XR (Glucophage-XR) 500 mg 24 hr tablet Take 2 tablets (1,000 mg) by mouth once daily with breakfast AND 1 tablet (500 mg) once daily in the evening. Take with meals. Do not crush, chew, or split..    metoprolol succinate XL (TOPROL-XL) 50 mg, oral, Daily    mv-min/iron/folic/calcium/vitK (WOMEN'S MULTIVITAMIN ORAL) 1 tablet, Daily    nicotine (Nicoderm CQ) 7 mg/24 hr patch 1 patch, transdermal, Every 24 hours    omeprazole (PRILOSEC) 20 mg, oral, Daily before breakfast    prazosin (MINIPRESS) 1 mg, Nightly    traZODone (DESYREL) 200 mg, Nightly    varenicline tartrate (CHANTIX) 1 mg, oral, 2 times daily, Take with full glass of water        [facility] [place], [place] - [id number] [facility] [address]  Phone: [phone] Fax: [phone]    [place], [place] - [address] [facility]  [address]  [facility]  [address]  [facility] [address]  Phone: [phone] Fax: [phone]    [facility]  [address], [address]  [facility] [address]  Phone: [phone] Fax: [phone]    [id number] - [place], [place] - [address]  [address]  [place] [address]  Phone: [phone] Fax: [phone]      DRUG INTERACTIONS  None requiring intervention at this time    Assessment/Plan   Problem List Items Addressed This Visit       Diabetes (Multi)    Relevant Medications    metFORMIN XR (Glucophage-XR) 500 mg 24 hr tablet    blood-glucose sensor (FreeStyle Nanci 2 Plus Sensor) device    Other Relevant Orders    Referral to Clinical Pharmacy    Nicotine dependence, unspecified, uncomplicated    Relevant Orders    Referral to Clinical Pharmacy     Patient identified self-management goal:  BG control, Patients diabetes is poorly controlled, needs to quit smoking, and needs to follow diet more regularly   Patient's goal A1c is < 7%.  Is pt at goal? No, 8.6% as of 2/11/24    Patient's SMBGs are improved but still elevated  Complications: Obesity, neuropathy, smoker, alcohol dependence, CKD  Rationale for plan: Patient's current blood sugar significantly improved with addition of metformin ER 500mg BID but still experiences high FBG. She is not completely sure about PPBG but it had previously been elevated at bedtime. No side effects with current regimen, and BG have improved. Will plan to continue Trulicity and Farxiga and increase metformin ER for better BG control with additional monitoring.   Medication Changes:  CONTINUE:  Farxiga 10mg daily  Trulicity 4.5mg weekly (12/22-)  INCREASE:  Metformin ER 500mg - 2 tabs QAM and 1 tab QPM with food  Plan to increase to 2 tabs BID if tolerated and if BG is still elevated  Compliance at present is estimated to be excellent. Efforts to improve compliance (if necessary) will be directed at dietary modifications: decreased carbohydrates,  increased protein/vegetables/fiber, increased exercise, regular blood sugar monitorin-3 times daily, and smoking cessation .  Libreview invitation was sent to patient to connect  Manufacture of Nanci 2 sensor will be discontinued. So will switch and send over new Rx for Nanci 2 plus sensors  Patient confirmed receipt of email but has not tried to set up due to tendonitis pain  Smoking cessation. Smoking has remained the same at 1-2 cig/day.   Continue Chantix 1mg twice daily  Continue nicotine 7 mg patch every 24 hours (use consistently)  Education Provided to Patient: 15-15, smoking cessation, Libreview, diet and exercise.      Pharm Follow-up: 3/18 @9 am for BG control and metformin tolerance. Due for labs and check renal function     Binta Jimenez, Joyce   Clinical Pharmacy Specialist  Phone: [phone]  Fax: [phone]    Continue all meds under the continuation of care with the referring provider and clinical pharmacy team.    Verbal consent to manage patient's drug therapy was obtained from the patient. They were informed they may decline to participate or withdraw from participation in pharmacy services at any time.

## 2025-02-25 NOTE — Clinical Note
Given elevated BG, will increase metformin and follow-up to check tolerance and BG control. Discussed smoking cessation and recommended regular use of patch to stop smoking. Thanks! Binta Jimenez, PharmD

## 2025-02-26 RX ORDER — VARENICLINE TARTRATE 1 MG/1
1 TABLET, FILM COATED ORAL 2 TIMES DAILY
Qty: 60 TABLET | Refills: 10 | OUTPATIENT
Start: 2025-02-26

## 2025-03-10 NOTE — PROGRESS NOTES
Subjective      Chief Complaint   Patient presents with    Left Arm - Follow-up        No surgery found     HPI  This 59 year old patient presents for evaluation and treatment of left shoulder/upper arm pain. She was evaluated in the [facility] Emergency Department on 1/17/25 for complaints of left upper arm pain that had been present since August 2024. Her pain had flared up the week prior to her ER visit, primarily with overhead activity. She currently rates left shoulder/upper extremity pain at 7/10. She denies any injury or trauma to her left upper extremity. She did have a vascular US performed on 1/17/25 which were normal, and xrays performed 8/2024 were negative for fracture. Additionally, she has had CT scans of her cervical, thoracic, and lumbar spine in November 2024.  She denies any chest pain or shortness of breath.     CARDIOLOGY:   Negative for chest pain, shortness of breath.   RESPIRATORY:   Negative for chest pain, shortness of breath.   MUSCULOSKELETAL:   See HPI for details.   NEUROLOGY:   Negative for tingling, numbness, weakness.    Objective    There were no vitals filed for this visit.    Physical Exam  GENERAL:          General Appearance:  This is a pleasant patient with appropriate affect, in no acute distress.   DERMATOLOGY:          Skin: skin at the neck, upper and lower back, and trunk is intact. There is no evidence of skin rash, skin breakdown or ulceration, or atrophic skin change.   EXTREMITIES:          Vascular:  Right, left hands and feet are warm with good color and pulses. Right and left calf and thigh are nontender and nonswollen.   NEUROLOGICAL:          Orientation:  Patient is alert and oriented to person, place, time and situation. Right and left upper and lower extremity motor and sensory examinations are intact.      MUSCULOSKELETAL: Neck: Nontender. No pain with range of motion. Back: No tenderness. Straight leg test negative bilaterally. Right and left hips: Nontender. No  pain or limitation with ROM. Left  shoulder: There is tenderness anteriorly and laterally. Active abduction and active flexion are 0-110 degrees but with pain and guarding. The distal biceps is palpable. There is pain with and limitation of active and passive internal and external rotation. Right shoulder: nontender. No pain or limitation with ROM.     X-rays of the left shoulder done on 8- show: IMPRESSION:   No acute fracture or dislocation.  Degenerative changes of the left   shoulder.     CT of the cervical spine done on 11-:  IMPRESSION:     1. No acute intracranial findings.   2.  No acute fracture of the cervical spine.   3.  No acute fracture of the thoracic spine.   4.  No acute fracture of the lumbar spine.     Patient ID: Linnette Rm is a 59 y.o. female.    L Inj/Asp (Along the left biceps tendon sheath) on 3/11/2025 8:55 AM  Indications: pain  Details: 22 G needle, lateral approach  Medications: 1 mL lidocaine 10 mg/mL (1 %); 10 mg triamcinolone acetonide 40 mg/mL  Outcome: tolerated well, no immediate complications  Procedure, treatment alternatives, risks and benefits explained, specific risks discussed. Immediately prior to procedure a time out was called to verify the correct patient, procedure, equipment, support staff and site/side marked as required. Patient was prepped and draped in the usual sterile fashion.             Linnette was seen today for follow-up.  Diagnoses and all orders for this visit:  Chronic left shoulder pain (Primary)  -     Referral to Physical Therapy; Future  Primary osteoarthritis, left shoulder  -     Referral to Physical Therapy; Future  Primary osteoarthritis of cervical spine  -     Referral to Physical Therapy; Future  Biceps tendonitis on left  -     Referral to Physical Therapy; Future     Options are discussed with the patient in detail.  The patient was given a prescription for physical therapy.  The patient is instructed regarding  activity modification, ice, provider directed at home gentle strengthening and ROM exercises, and the appropriate use of Tylenol as needed for pain with its potential adverse reactions and side effects. The patient understands. The patient states that despite all the treatment listed above that this left shoulder pain is debilitating and  requests a discussion of further options. Cortisone injection to the left shoulder is discussed in the office today. This is done in the office today. See procedures below. Return as needed, Please note that this report has been produced using speech recognition software.  It may contain errors related to grammar, punctuation or spelling.  Electronically signed, but not reviewed.  Lindsey Goode PA-C

## 2025-03-11 ENCOUNTER — OFFICE VISIT (OUTPATIENT)
Dept: ORTHOPEDIC SURGERY | Facility: CLINIC | Age: 60
End: 2025-03-11
Payer: COMMERCIAL

## 2025-03-11 VITALS — BODY MASS INDEX: 34.32 KG/M2 | HEIGHT: 65 IN | WEIGHT: 206 LBS

## 2025-03-11 DIAGNOSIS — M19.012 PRIMARY OSTEOARTHRITIS, LEFT SHOULDER: ICD-10-CM

## 2025-03-11 DIAGNOSIS — G89.29 CHRONIC LEFT SHOULDER PAIN: Primary | ICD-10-CM

## 2025-03-11 DIAGNOSIS — M25.512 CHRONIC LEFT SHOULDER PAIN: Primary | ICD-10-CM

## 2025-03-11 DIAGNOSIS — M47.812 PRIMARY OSTEOARTHRITIS OF CERVICAL SPINE: ICD-10-CM

## 2025-03-11 DIAGNOSIS — M75.22 BICEPS TENDONITIS ON LEFT: ICD-10-CM

## 2025-03-11 PROCEDURE — 99203 OFFICE O/P NEW LOW 30 MIN: CPT | Performed by: PHYSICIAN ASSISTANT

## 2025-03-11 PROCEDURE — 2500000004 HC RX 250 GENERAL PHARMACY W/ HCPCS (ALT 636 FOR OP/ED): Performed by: PHYSICIAN ASSISTANT

## 2025-03-11 PROCEDURE — 99213 OFFICE O/P EST LOW 20 MIN: CPT | Mod: 25 | Performed by: PHYSICIAN ASSISTANT

## 2025-03-11 PROCEDURE — 3008F BODY MASS INDEX DOCD: CPT | Performed by: PHYSICIAN ASSISTANT

## 2025-03-11 RX ORDER — LIDOCAINE HYDROCHLORIDE 10 MG/ML
1 INJECTION, SOLUTION INFILTRATION; PERINEURAL
Status: COMPLETED | OUTPATIENT
Start: 2025-03-11 | End: 2025-03-11

## 2025-03-11 RX ORDER — TRIAMCINOLONE ACETONIDE 40 MG/ML
10 INJECTION, SUSPENSION INTRA-ARTICULAR; INTRAMUSCULAR
Status: COMPLETED | OUTPATIENT
Start: 2025-03-11 | End: 2025-03-11

## 2025-03-11 RX ADMIN — LIDOCAINE HYDROCHLORIDE 1 ML: 10 INJECTION, SOLUTION INFILTRATION; PERINEURAL at 08:55

## 2025-03-11 RX ADMIN — TRIAMCINOLONE ACETONIDE 10 MG: 40 INJECTION, SUSPENSION INTRA-ARTICULAR; INTRAMUSCULAR at 08:55

## 2025-03-11 ASSESSMENT — LIFESTYLE VARIABLES
HOW OFTEN DURING THE LAST YEAR HAVE YOU NEEDED AN ALCOHOLIC DRINK FIRST THING IN THE MORNING TO GET YOURSELF GOING AFTER A NIGHT OF HEAVY DRINKING: NEVER
HOW OFTEN DO YOU HAVE SIX OR MORE DRINKS ON ONE OCCASION: NEVER
HOW OFTEN DURING THE LAST YEAR HAVE YOU FOUND THAT YOU WERE NOT ABLE TO STOP DRINKING ONCE YOU HAD STARTED: NEVER
AUDIT-C TOTAL SCORE: 0
AUDIT TOTAL SCORE: 0
SKIP TO QUESTIONS 9-10: 1
HOW MANY STANDARD DRINKS CONTAINING ALCOHOL DO YOU HAVE ON A TYPICAL DAY: PATIENT DOES NOT DRINK
HOW OFTEN DURING THE LAST YEAR HAVE YOU HAD A FEELING OF GUILT OR REMORSE AFTER DRINKING: NEVER
HOW OFTEN DURING THE LAST YEAR HAVE YOU FAILED TO DO WHAT WAS NORMALLY EXPECTED FROM YOU BECAUSE OF DRINKING: NEVER
HAVE YOU OR SOMEONE ELSE BEEN INJURED AS A RESULT OF YOUR DRINKING: NO
HAS A RELATIVE, FRIEND, DOCTOR, OR ANOTHER HEALTH PROFESSIONAL EXPRESSED CONCERN ABOUT YOUR DRINKING OR SUGGESTED YOU CUT DOWN: NO
HOW OFTEN DURING THE LAST YEAR HAVE YOU BEEN UNABLE TO REMEMBER WHAT HAPPENED THE NIGHT BEFORE BECAUSE YOU HAD BEEN DRINKING: NEVER
HOW OFTEN DO YOU HAVE A DRINK CONTAINING ALCOHOL: NEVER

## 2025-03-11 ASSESSMENT — ENCOUNTER SYMPTOMS
LOSS OF SENSATION IN FEET: 0
OCCASIONAL FEELINGS OF UNSTEADINESS: 0
DEPRESSION: 0

## 2025-03-11 ASSESSMENT — PAIN DESCRIPTION - DESCRIPTORS: DESCRIPTORS: SHARP;THROBBING

## 2025-03-11 ASSESSMENT — PAIN SCALES - GENERAL
PAINLEVEL_OUTOF10: 7
PAINLEVEL_OUTOF10: 7

## 2025-03-11 ASSESSMENT — PAIN - FUNCTIONAL ASSESSMENT: PAIN_FUNCTIONAL_ASSESSMENT: 0-10

## 2025-03-11 ASSESSMENT — PATIENT HEALTH QUESTIONNAIRE - PHQ9
1. LITTLE INTEREST OR PLEASURE IN DOING THINGS: NOT AT ALL
SUM OF ALL RESPONSES TO PHQ9 QUESTIONS 1 AND 2: 0
2. FEELING DOWN, DEPRESSED OR HOPELESS: NOT AT ALL

## 2025-03-11 ASSESSMENT — COLUMBIA-SUICIDE SEVERITY RATING SCALE - C-SSRS
6. HAVE YOU EVER DONE ANYTHING, STARTED TO DO ANYTHING, OR PREPARED TO DO ANYTHING TO END YOUR LIFE?: NO
2. HAVE YOU ACTUALLY HAD ANY THOUGHTS OF KILLING YOURSELF?: NO
1. IN THE PAST MONTH, HAVE YOU WISHED YOU WERE DEAD OR WISHED YOU COULD GO TO SLEEP AND NOT WAKE UP?: NO

## 2025-03-11 NOTE — PATIENT INSTRUCTIONS
Thank you for coming to see us today!     We are going to give you a referral for physical therapy   Please call central scheduling to make this appointment.     Please follow up with us IN 4 WEEKS

## 2025-03-18 ENCOUNTER — APPOINTMENT (OUTPATIENT)
Dept: PHARMACY | Facility: HOSPITAL | Age: 60
End: 2025-03-18
Payer: COMMERCIAL

## 2025-03-21 DIAGNOSIS — F17.210 CIGARETTE NICOTINE DEPENDENCE WITHOUT COMPLICATION: ICD-10-CM

## 2025-03-21 DIAGNOSIS — E11.65 TYPE 2 DIABETES MELLITUS WITH HYPERGLYCEMIA, WITHOUT LONG-TERM CURRENT USE OF INSULIN: ICD-10-CM

## 2025-04-03 ENCOUNTER — APPOINTMENT (OUTPATIENT)
Dept: PAIN MEDICINE | Facility: CLINIC | Age: 60
End: 2025-04-03
Payer: COMMERCIAL

## 2025-04-07 ENCOUNTER — TELEPHONE (OUTPATIENT)
Dept: PRIMARY CARE | Facility: CLINIC | Age: 60
End: 2025-04-07
Payer: COMMERCIAL

## 2025-04-07 NOTE — TELEPHONE ENCOUNTER
Patient is requesting to re-establish care she was last seen in 2022 and can be reached at [phone].

## 2025-04-07 NOTE — PROGRESS NOTES
Subjective      Chief Complaint   Patient presents with    Left Arm - Follow-up        HPI  This 59 year old patient presents for re-evaluation and treatment of left shoulder/upper arm pain. She was evaluated in the [facility] Emergency Department on 1/17/25 for complaints of left upper arm pain that had been present since August 2024. Her pain had flared up the week prior to her ER visit, primarily with overhead activity. She currently rates left shoulder/upper extremity pain at 8/10. She denies any injury or trauma to her left upper extremity. She did have a vascular US performed on 1/17/25 which were normal, and xrays performed 8/2024 were negative for fracture. Additionally, she has had CT scans of her cervical, thoracic, and lumbar spine in November 2024.  She received a cortisone injection into her left shoulder at her last office visit on 3/11/25 and reports that she had minimal relief of symptoms s/p injection for about 1 week. She presents today to discuss further treatment options. She feels persistent left shoulder instability, and has difficulty performing her normal activities of daily living including her duties at her job. There has been no improvement in pain since the last visit. She was attending physical therapy but stopped going because it was not alleviating her pain.     CARDIOLOGY:   Negative for chest pain, shortness of breath.   RESPIRATORY:   Negative for chest pain, shortness of breath.   MUSCULOSKELETAL:   See HPI for details.   NEUROLOGY:   Negative for tingling, numbness, weakness.    Objective    There were no vitals filed for this visit.    Physical Exam  GENERAL:          General Appearance:  This is a pleasant patient with appropriate affect, in no acute distress.   DERMATOLOGY:          Skin: skin at the neck, upper and lower back, and trunk is intact. There is no evidence of skin rash, skin breakdown or ulceration, or atrophic skin change.   EXTREMITIES:          Vascular:  Right, left  hands and feet are warm with good color and pulses. Right and left calf and thigh are nontender and nonswollen.   NEUROLOGICAL:          Orientation:  Patient is alert and oriented to person, place, time and situation. Right and left upper and lower extremity motor and sensory examinations are intact.      MUSCULOSKELETAL: Neck: Nontender. No pain with range of motion. Back: No tenderness. Straight leg test negative bilaterally. Right and left hips: Nontender. No pain or limitation with ROM. Left  shoulder: There is tenderness anteriorly and laterally. Active abduction and active flexion are 0-90 degrees but with pain and guarding. Empty can test is positive. Drop arm test is positive. The distal biceps is palpable. There is pain with and limitation of active and passive internal and external rotation. Right shoulder: nontender. No pain or limitation with ROM.     X-rays of the left shoulder done on 8- show: IMPRESSION:   No acute fracture or dislocation.  Degenerative changes of the left   shoulder.     CT of the cervical spine done on 11-:  IMPRESSION:     1. No acute intracranial findings.   2.  No acute fracture of the cervical spine.   3.  No acute fracture of the thoracic spine.   4.  No acute fracture of the lumbar spine.     Patient ID: Linnette Rm is a 59 y.o. female.    Procedures      Linnette was seen today for follow-up.  Diagnoses and all orders for this visit:  Chronic left shoulder pain (Primary)  -     MR shoulder left wo IV contrast; Future  -     naproxen (Naprosyn) 500 mg tablet; Take 1 tablet (500 mg) by mouth 2 times a day.  Primary osteoarthritis, left shoulder  -     MR shoulder left wo IV contrast; Future  -     naproxen (Naprosyn) 500 mg tablet; Take 1 tablet (500 mg) by mouth 2 times a day.  Primary osteoarthritis of cervical spine  -     MR shoulder left wo IV contrast; Future  -     naproxen (Naprosyn) 500 mg tablet; Take 1 tablet (500 mg) by mouth 2 times a  day.  Biceps tendonitis on left  -     MR shoulder left wo IV contrast; Future  -     naproxen (Naprosyn) 500 mg tablet; Take 1 tablet (500 mg) by mouth 2 times a day.  Sprain of left rotator cuff capsule, subsequent encounter  -     naproxen (Naprosyn) 500 mg tablet; Take 1 tablet (500 mg) by mouth 2 times a day.  Acute bursitis of left shoulder  -     naproxen (Naprosyn) 500 mg tablet; Take 1 tablet (500 mg) by mouth 2 times a day.       Options are discussed with the patient in detail.  The patient is given a referral for MRI of the left shoulder. The patient is instructed regarding activity modification, ice, provider directed at home gentle strengthening and ROM exercises, and the appropriate use of Tylenol as needed for pain with its potential adverse reactions and side effects. The patient understands. Return after MRI is complete or sooner as needed, Please note that this report has been produced using speech recognition software.  It may contain errors related to grammar, punctuation or spelling.  Electronically signed, but not reviewed.  Lindsey Goode PA-C

## 2025-04-08 ENCOUNTER — OFFICE VISIT (OUTPATIENT)
Dept: ORTHOPEDIC SURGERY | Facility: CLINIC | Age: 60
End: 2025-04-08
Payer: COMMERCIAL

## 2025-04-08 VITALS — HEIGHT: 65 IN | WEIGHT: 206 LBS | BODY MASS INDEX: 34.32 KG/M2

## 2025-04-08 DIAGNOSIS — G89.29 CHRONIC LEFT SHOULDER PAIN: Primary | ICD-10-CM

## 2025-04-08 DIAGNOSIS — M75.52 ACUTE BURSITIS OF LEFT SHOULDER: ICD-10-CM

## 2025-04-08 DIAGNOSIS — M19.012 PRIMARY OSTEOARTHRITIS, LEFT SHOULDER: ICD-10-CM

## 2025-04-08 DIAGNOSIS — M75.22 BICEPS TENDONITIS ON LEFT: ICD-10-CM

## 2025-04-08 DIAGNOSIS — M47.812 PRIMARY OSTEOARTHRITIS OF CERVICAL SPINE: ICD-10-CM

## 2025-04-08 DIAGNOSIS — S43.422D SPRAIN OF LEFT ROTATOR CUFF CAPSULE, SUBSEQUENT ENCOUNTER: ICD-10-CM

## 2025-04-08 DIAGNOSIS — M25.512 CHRONIC LEFT SHOULDER PAIN: Primary | ICD-10-CM

## 2025-04-08 PROBLEM — S43.422A SPRAIN OF LEFT ROTATOR CUFF CAPSULE: Status: ACTIVE | Noted: 2025-04-08

## 2025-04-08 PROCEDURE — 99213 OFFICE O/P EST LOW 20 MIN: CPT | Performed by: PHYSICIAN ASSISTANT

## 2025-04-08 PROCEDURE — 3008F BODY MASS INDEX DOCD: CPT | Performed by: PHYSICIAN ASSISTANT

## 2025-04-08 RX ORDER — NAPROXEN 500 MG/1
500 TABLET ORAL 2 TIMES DAILY
Qty: 60 TABLET | Refills: 0 | Status: SHIPPED | OUTPATIENT
Start: 2025-04-08 | End: 2025-05-08

## 2025-04-08 ASSESSMENT — LIFESTYLE VARIABLES
HOW OFTEN DO YOU HAVE SIX OR MORE DRINKS ON ONE OCCASION: NEVER
HOW OFTEN DO YOU HAVE A DRINK CONTAINING ALCOHOL: NEVER

## 2025-04-08 ASSESSMENT — PAIN SCALES - GENERAL
PAINLEVEL_OUTOF10: 6
PAINLEVEL_OUTOF10: 6

## 2025-04-08 ASSESSMENT — COLUMBIA-SUICIDE SEVERITY RATING SCALE - C-SSRS
2. HAVE YOU ACTUALLY HAD ANY THOUGHTS OF KILLING YOURSELF?: NO
1. IN THE PAST MONTH, HAVE YOU WISHED YOU WERE DEAD OR WISHED YOU COULD GO TO SLEEP AND NOT WAKE UP?: NO
6. HAVE YOU EVER DONE ANYTHING, STARTED TO DO ANYTHING, OR PREPARED TO DO ANYTHING TO END YOUR LIFE?: NO

## 2025-04-08 ASSESSMENT — PAIN - FUNCTIONAL ASSESSMENT: PAIN_FUNCTIONAL_ASSESSMENT: 0-10

## 2025-04-08 ASSESSMENT — ENCOUNTER SYMPTOMS
OCCASIONAL FEELINGS OF UNSTEADINESS: 0
DEPRESSION: 0
LOSS OF SENSATION IN FEET: 0

## 2025-04-08 NOTE — PATIENT INSTRUCTIONS
Received patient transfer from [facility]; Oriented to room via  Emil # [id number], call light w/in reach; safety measures all in  Placed.   Thank you for coming to see us today!     Continue to rest, ice, and elevate  Tylenol for pain control  We are ordering an MRI in office today.     Please call central scheduling to schedule your MRI  Once you have a date for your MRI, call our office to schedule a follow up with Dr. Worrell

## 2025-04-10 RX ORDER — NICOTINE 7MG/24HR
1 PATCH, TRANSDERMAL 24 HOURS TRANSDERMAL
Qty: 14 PATCH | Refills: 0 | Status: SHIPPED | OUTPATIENT
Start: 2025-04-10

## 2025-04-18 DIAGNOSIS — N76.0 RECURRENT VAGINITIS: ICD-10-CM

## 2025-04-18 DIAGNOSIS — N89.8 VAGINAL DISCHARGE: ICD-10-CM

## 2025-04-18 DIAGNOSIS — Z79.4 TYPE 2 DIABETES MELLITUS WITH OTHER SPECIFIED COMPLICATION, WITH LONG-TERM CURRENT USE OF INSULIN: ICD-10-CM

## 2025-04-18 DIAGNOSIS — E11.65 TYPE 2 DIABETES MELLITUS WITH HYPERGLYCEMIA, WITHOUT LONG-TERM CURRENT USE OF INSULIN: ICD-10-CM

## 2025-04-18 DIAGNOSIS — E11.69 TYPE 2 DIABETES MELLITUS WITH OTHER SPECIFIED COMPLICATION, WITH LONG-TERM CURRENT USE OF INSULIN: ICD-10-CM

## 2025-04-21 DIAGNOSIS — E11.65 TYPE 2 DIABETES MELLITUS WITH HYPERGLYCEMIA, WITHOUT LONG-TERM CURRENT USE OF INSULIN: ICD-10-CM

## 2025-04-21 DIAGNOSIS — F17.210 CIGARETTE NICOTINE DEPENDENCE WITHOUT COMPLICATION: ICD-10-CM

## 2025-04-21 RX ORDER — TERCONAZOLE 8 MG/G
CREAM VAGINAL
Qty: 20 G | Refills: 11 | OUTPATIENT
Start: 2025-04-21

## 2025-04-22 ENCOUNTER — HOSPITAL ENCOUNTER (OUTPATIENT)
Dept: RADIOLOGY | Facility: HOSPITAL | Age: 60
Discharge: HOME | End: 2025-04-22
Payer: COMMERCIAL

## 2025-04-22 DIAGNOSIS — M75.22 BICEPS TENDONITIS ON LEFT: ICD-10-CM

## 2025-04-22 DIAGNOSIS — G89.29 CHRONIC LEFT SHOULDER PAIN: ICD-10-CM

## 2025-04-22 DIAGNOSIS — M19.012 PRIMARY OSTEOARTHRITIS, LEFT SHOULDER: ICD-10-CM

## 2025-04-22 DIAGNOSIS — M25.512 CHRONIC LEFT SHOULDER PAIN: ICD-10-CM

## 2025-04-22 DIAGNOSIS — M47.812 PRIMARY OSTEOARTHRITIS OF CERVICAL SPINE: ICD-10-CM

## 2025-04-22 PROCEDURE — 73221 MRI JOINT UPR EXTREM W/O DYE: CPT | Mod: LEFT SIDE | Performed by: RADIOLOGY

## 2025-04-22 PROCEDURE — 73221 MRI JOINT UPR EXTREM W/O DYE: CPT | Mod: LT

## 2025-04-25 RX ORDER — FLASH GLUCOSE SENSOR
KIT MISCELLANEOUS
Qty: 4 EACH | Refills: 1 | Status: SHIPPED | OUTPATIENT
Start: 2025-04-25

## 2025-05-06 ENCOUNTER — APPOINTMENT (OUTPATIENT)
Dept: PRIMARY CARE | Facility: CLINIC | Age: 60
End: 2025-05-06
Payer: COMMERCIAL

## 2025-05-06 ENCOUNTER — TELEMEDICINE (OUTPATIENT)
Dept: PHARMACY | Facility: HOSPITAL | Age: 60
End: 2025-05-06
Payer: COMMERCIAL

## 2025-05-06 DIAGNOSIS — E11.9 TYPE 2 DIABETES MELLITUS WITHOUT RETINOPATHY (MULTI): ICD-10-CM

## 2025-05-06 DIAGNOSIS — E11.69 TYPE 2 DIABETES MELLITUS WITH OTHER SPECIFIED COMPLICATION, WITH LONG-TERM CURRENT USE OF INSULIN: ICD-10-CM

## 2025-05-06 DIAGNOSIS — F17.210 CIGARETTE NICOTINE DEPENDENCE WITHOUT COMPLICATION: ICD-10-CM

## 2025-05-06 DIAGNOSIS — E11.65 TYPE 2 DIABETES MELLITUS WITH HYPERGLYCEMIA, WITHOUT LONG-TERM CURRENT USE OF INSULIN: ICD-10-CM

## 2025-05-06 DIAGNOSIS — Z79.4 TYPE 2 DIABETES MELLITUS WITH OTHER SPECIFIED COMPLICATION, WITH LONG-TERM CURRENT USE OF INSULIN: ICD-10-CM

## 2025-05-06 RX ORDER — NICOTINE 7MG/24HR
PATCH, TRANSDERMAL 24 HOURS TRANSDERMAL
Refills: 11 | OUTPATIENT
Start: 2025-05-06

## 2025-05-06 RX ORDER — DAPAGLIFLOZIN 10 MG/1
10 TABLET, FILM COATED ORAL DAILY
Qty: 90 TABLET | Refills: 1 | Status: SHIPPED | OUTPATIENT
Start: 2025-05-06

## 2025-05-06 RX ORDER — LANCETS
EACH MISCELLANEOUS
Qty: 100 EACH | Refills: 11 | Status: SHIPPED | OUTPATIENT
Start: 2025-05-06

## 2025-05-06 RX ORDER — DULAGLUTIDE 4.5 MG/.5ML
4.5 INJECTION, SOLUTION SUBCUTANEOUS WEEKLY
Qty: 6 ML | Refills: 1 | Status: SHIPPED | OUTPATIENT
Start: 2025-05-06

## 2025-05-06 RX ORDER — METFORMIN HYDROCHLORIDE 500 MG/1
TABLET, EXTENDED RELEASE ORAL
Qty: 90 TABLET | Refills: 0 | Status: SHIPPED | OUTPATIENT
Start: 2025-05-06

## 2025-05-06 RX ORDER — METFORMIN HYDROCHLORIDE 500 MG/1
TABLET, EXTENDED RELEASE ORAL
Qty: 90 TABLET | Refills: 11 | OUTPATIENT
Start: 2025-05-06

## 2025-05-06 RX ORDER — BLOOD-GLUCOSE SENSOR
EACH MISCELLANEOUS
Qty: 6 EACH | Refills: 1 | Status: SHIPPED | OUTPATIENT
Start: 2025-05-06

## 2025-05-06 RX ORDER — NICOTINE 7MG/24HR
1 PATCH, TRANSDERMAL 24 HOURS TRANSDERMAL
Qty: 14 PATCH | Refills: 1 | Status: SHIPPED | OUTPATIENT
Start: 2025-05-06

## 2025-05-06 RX ORDER — BLOOD SUGAR DIAGNOSTIC
STRIP MISCELLANEOUS
Qty: 400 STRIP | Refills: 11 | Status: SHIPPED | OUTPATIENT
Start: 2025-05-06

## 2025-05-06 NOTE — PROGRESS NOTES
Patient ID: Linnette Vetnura is a 59 y.o. female who presents for Diabetes and Nicotine Dependence.    PCP/Referring Provider: Laura Busby MD - last visit: 5/23/24, next visit: 2/20/25. 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 She confirms she will be will be establishing with her now    Subjective   No Known Allergies  HPI    Interval History: 2 years ago    Diabetes Mellitus Type 2  Known complications due to diabetes included chronic kidney disease and obesity    Current Diabetes Medications:   Farxiga 10mg daily  Trulicity 4.5mg weekly on Sun  No side effects  No decrease in appetite  Metformin ER 500mg - 2 tabs QAM and and 1 tab QPM with food  No noticeable side effects    Historical Diabetes Medications:  -medication, duration of therapy, reason discontinued:  lantus 8 units once daily (at night, maybe takes it 3-4 times per week)   humalog 6 units three times daily (patient takes it if she has starch in a meal, or if she has a big meal; she takes it maybe 2 times per week)   Metformin (afraid of side effects)    SMBG Measurements  Patient is using: both glucometer and continuous glucose monitor Freestyle Nanci 2 and   The patient is currently checking the blood glucose >4 times per day.  Hypoglycemia  Patient reports none.  Hypoglycemia frequency: none  Hypoglycemia awareness: No   Reports 0 episodes of hypoglycemia for a while  Hyperglycemia  Patient reports excessive thirst, fatigue, polyuria, weakness, and nausea.  FBG (avg): 115 (highest)  PPBG (avg): 120-140s  highest 160 after drinking a free smoothie after work  Bedtime BG (avg): 110-115    Diet: Eats 2 times throughout the day. Eats what she wants and a lot carbohydrates/starches lately  Breakfast: skips often  Brunch: hebert, eggs, ham, leftovers  Dinner: chicken, fish, takeout, fast food  Snacks: all day on chips, cookies, candy  Fluids: improved but may not be enough on water intake  (several large cups oz of water per day), still drinks Pepsi 12 oz x2 per day)    Physical Activity   None, mostly walking to and at work (20 minutes)     Pertinent PMH Review:  PMH of Pancreatitis: No  PMH of Retinopathy: No  PMH of Urinary Tract Infections: Yes, currently. Frequently lately due to high BG.   PMH of MTC: No    Primary Prevention  The 10-year ASCVD risk score (Sandor MCNAIR, et al., 2019) is: 20.6%    Values used to calculate the score:      Age: 59 years      Sex: Female      Is Non- : Yes      Diabetic: Yes      Tobacco smoker: Yes      Systolic Blood Pressure: 124 mmHg      Is BP treated: Yes      HDL Cholesterol: 49.9 mg/dL      Total Cholesterol: 169 mg/dL    Statin? Yes - atorvastatin 40mg  ACE-I/ARB? No  Aspirin? No  Last eye exam: 2024  Last diabetic foot exam: >1 year  Has 0 sores/cuts on feet today    TOBACCO  Tobacco Use:  Patient currently reports usin cigarettes/day. When she works, she may only smoke 1 cig. Used Nicotine 7mg patch a few days but hasn't paid attention if it works. Sometimes smokes within or after 30 min of waking up.   Current treatment:   Chantix 1mg daily  Denies any vivid dreams  buPROPion XL 300mg daily (for depression)  Reports she feels great and has inquired if she might be able to stop it, but psych recommended against stopping  Notices that when she's busy, she smokes less. Typically just smokes when she is home    AFFORDABILITY/ADHERENCE   [cost or adherence barriers]  Trulicity had been on back order but is available now. Pt has medicaid    Review of Systems    Objective   LMP  (LMP Unknown)    BP Readings from Last 4 Encounters:   25 124/84   24 130/72   24 136/80   24 130/80      There were no vitals filed for this visit.     LAB  Lab Results   Component Value Date    BILITOT 0.3 10/31/2024    CALCIUM 9.8 10/31/2024    CO2 27 10/31/2024     10/31/2024    CREATININE 1.22 (H) 10/31/2024    GLUCOSE 254  (H) 10/31/2024    ALKPHOS 133 (H) 10/31/2024    K 4.7 10/31/2024    PROT 7.8 10/31/2024     10/31/2024    AST 15 10/31/2024    ALT 20 10/31/2024    BUN 21 10/31/2024    ANIONGAP 16 10/31/2024    MG 2.07 10/31/2024    ALBUMIN 4.3 10/31/2024    GFRF 48 (A) 08/17/2023     Lab Results   Component Value Date    TRIG 184 (H) 10/31/2024    CHOL 169 10/31/2024    LDLCALC 82 10/31/2024    HDL 49.9 10/31/2024     Lab Results   Component Value Date    HGBA1C 8.6 (A) 02/11/2025     A comprehensive medication review was completed with the patient. Patient had all medications and/or an updated medication list in front of them during the telephone encounter.     MEDICATION RECONCILIATION  Added: none  Changed: buproprion XL 150mg -> buproprion XL 300mg daily  Removed: freestyle nanci 2 plus. Finishing up Nanci 2 which she was told won't be covered any more    Current Medications[1]    DRUG INTERACTIONS  -renal function with metformin use    Assessment/Plan    Comments/Recommendations to PCP:    Current Outpatient Medications   Medication Instructions    amLODIPine (NORVASC) 10 mg, oral, Daily    atorvastatin (LIPITOR) 40 mg, oral, Daily    blood sugar diagnostic (OneTouch Ultra Test) USE TO TEST BLOOD SUGAR 4 TIMES DAILY    buPROPion XL (WELLBUTRIN XL) 300 mg, oral, Daily    cyclobenzaprine (FLEXERIL) 10 mg, oral, Every 8 hours PRN    dapagliflozin propanediol (FARXIGA) 10 mg, oral, Daily    ergocalciferol (VITAMIN D-2) 50,000 Units, oral, Weekly    flash glucose sensor kit (FreeStyle Nanci 2 Sensor) kit USE TO MONITOR BLOOD SUGAR AS DIRECTED. CHANGE SENSOR EVERY 14 DAYS.    FreeStyle Nanci 3 Sensor device Use to check blood sugar throughout the day and change every 15 days    gabapentin (NEURONTIN) 300 mg, oral, Nightly    lancets misc Use to check blood sugar 4 times daily    metFORMIN XR (Glucophage-XR) 500 mg 24 hr tablet Take 2 tablets (1,000 mg) by mouth once daily with breakfast AND 1 tablet (500 mg) once daily in the  evening. Take with meals. Do not crush, chew, or split.    metoprolol succinate XL (TOPROL-XL) 50 mg, oral, Daily    mv-min/iron/folic/calcium/vitK (WOMEN'S MULTIVITAMIN ORAL) 1 tablet, Daily    naproxen (NAPROSYN) 500 mg, oral, 2 times daily    nicotine (Nicoderm CQ) 7 mg/24 hr patch 1 patch, transdermal, Every 24 hours scheduled    omeprazole (PRILOSEC) 20 mg, oral, Daily before breakfast    prazosin (MINIPRESS) 1 mg, Nightly    traZODone (DESYREL) 200 mg, Nightly    Trulicity 4.5 mg, subcutaneous, Weekly    varenicline tartrate (CHANTIX) 1 mg, oral, 2 times daily, Take with full glass of water        [facility] [place] [place] [facility] [address] [facility]  [address] [address]  Phone: [phone] Fax: [phone]    [facility] [address] [place] [address]  [address]  [address]  [facility] [address]  Phone: [phone] Fax: [phone]    [facility]  [address], [address]  [facility] [place] [address]  Phone: [phone] Fax: [phone]    [facility]pharm[facility] #[id number] - [place], [place] - [address] [address]  [address] [facility] [address]  Phone: [phone] Fax: [phone]      DRUG INTERACTIONS  None requiring intervention at this time    Assessment/Plan   Problem List Items Addressed This Visit       Diabetes (Multi)    Type 2 diabetes mellitus without retinopathy (Multi)    Relevant Medications    metFORMIN XR (Glucophage-XR) 500 mg 24 hr tablet    dulaglutide (Trulicity) 4.5 mg/0.5 mL pen injector    dapagliflozin propanediol (Farxiga) 10 mg tablet    blood sugar diagnostic (OneTouch Ultra Test)    lancets misc    FreeStyle Nanci 3 Sensor device    Other Relevant Orders    Hemoglobin A1c    Comprehensive Metabolic Panel    Lipid panel    Referral to Clinical Pharmacy    Nicotine dependence, unspecified, uncomplicated    Relevant Medications    nicotine (Nicoderm CQ) 7 mg/24 hr patch    Other Relevant Orders    Comprehensive  Metabolic Panel    Lipid panel    Referral to Clinical Pharmacy       Patient identified self-management goal:  BG control, Patients diabetes is poorly controlled, needs to quit smoking, and needs to follow diet more regularly   Patient's goal A1c is < 7%.  Is pt at goal? No, 8.6% as of 24   Patient's SMBGs are improved but still elevated  Complications: Obesity, neuropathy, smoker, alcohol dependence, CKD  Rationale for plan: Patient's current blood sugar significantly improved with addition of metformin ER 1500mg. She is not completely sure about PPBG but it had previously been elevated at bedtime. No side effects with current regimen, and BG have improved. Has not noticed weight loss. Will plan to continue Trulicity, Farxiga, and metformin ER for BG control with additional monitoring.   Medication Changes:  CONTINUE:  Farxiga 10mg daily  Trulicity 4.5mg weekly (-)  Metformin ER 500mg - 2 tabs QAM and 1 tab QPM with food  Plan to increase to 2 tabs BID if tolerated and if BG is still elevated  Compliance at present is estimated to be excellent. Efforts to improve compliance (if necessary) will be directed at dietary modifications: decreased carbohydrates, increased protein/vegetables/fiber, increased exercise, regular blood sugar monitorin-3 times daily, and smoking cessation.  [employer] invitation was sent to patient to connect  Manufacture of Nanci 2 sensor will be discontinued/no longer covered. So will switch and send over new Rx for Nanci 3 plus sensors  Patient confirmed receipt of email but has not tried to set up due to tendonitis pain  Smoking cessation. Smoking has remained the same at 1-2 cig/day.   Continue Chantix 1mg twice daily  Restart nicotine 7 mg patch every 24 hours (use consistently)  Education Provided to Patient: decrease soda intake and increase hydration due to renal function, smoking cessation, [employer] vini, diet and exercise.      Pharm Follow-up:  @10 am for BG  control and metformin tolerance. Due for labs and check renal function     Binta Jimenez, PharmD   Clinical Pharmacy Specialist  Phone: [phone]  Fax: [phone]    Continue all meds under the continuation of care with the referring provider and clinical pharmacy team.    Verbal consent to manage patient's drug therapy was obtained from the patient. They were informed they may decline to participate or withdraw from participation in pharmacy services at any time.         [1]   Current Outpatient Medications   Medication Sig Dispense Refill    amLODIPine (Norvasc) 10 mg tablet Take 1 tablet (10 mg) by mouth once daily. 90 tablet 1    atorvastatin (Lipitor) 40 mg tablet Take 1 tablet (40 mg) by mouth once daily. 90 tablet 1    blood sugar diagnostic (OneTouch Ultra Test) USE TO TEST BLOOD SUGAR 4 TIMES DAILY 400 strip 11    buPROPion XL (Wellbutrin XL) 300 mg 24 hr tablet Take 1 tablet (300 mg) by mouth once daily.      cyclobenzaprine (Flexeril) 10 mg tablet Take 1 tablet (10 mg) by mouth every 8 hours if needed for muscle spasms. 90 tablet 0    dapagliflozin propanediol (Farxiga) 10 mg tablet Take 1 tablet (10 mg) by mouth once daily. 90 tablet 1    dulaglutide (Trulicity) 4.5 mg/0.5 mL pen injector Inject 4.5 mg under the skin 1 (one) time per week. 6 mL 1    ergocalciferol (Vitamin D-2) 1.25 MG (19732 UT) capsule Take 1 capsule (50,000 Units) by mouth 1 (one) time per week. 12 capsule 3    flash glucose sensor kit (FreeStyle Nanci 2 Sensor) kit USE TO MONITOR BLOOD SUGAR AS DIRECTED. CHANGE SENSOR EVERY 14 DAYS. 4 each 1    FreeStyle Nanci 3 Sensor device Use to check blood sugar throughout the day and change every 15 days 6 each 1    gabapentin (Neurontin) 300 mg capsule Take 1 capsule (300 mg) by mouth once daily at bedtime. 30 capsule 1    lancets misc Use to check blood sugar 4 times daily 100 each 11    metFORMIN XR (Glucophage-XR) 500 mg 24 hr tablet Take 2 tablets (1,000 mg) by mouth once daily  with breakfast AND 1 tablet (500 mg) once daily in the evening. Take with meals. Do not crush, chew, or split. 90 tablet 0    metoprolol succinate XL (Toprol-XL) 50 mg 24 hr tablet Take 1 tablet (50 mg) by mouth once daily. 90 tablet 1    mv-min/iron/folic/calcium/vitK (WOMEN'S MULTIVITAMIN ORAL) Take 1 tablet by mouth once daily.      naproxen (Naprosyn) 500 mg tablet Take 1 tablet (500 mg) by mouth 2 times a day. 60 tablet 0    nicotine (Nicoderm CQ) 7 mg/24 hr patch Place 1 patch on the skin once every 24 hours. 14 patch 1    omeprazole (PriLOSEC) 20 mg DR capsule Take 1 capsule (20 mg) by mouth once daily in the morning. Take before meals. 90 capsule 1    prazosin (Minipress) 1 mg capsule Take 1 capsule (1 mg) by mouth once daily at bedtime.      traZODone (Desyrel) 100 mg tablet Take 2 tablets (200 mg) by mouth once daily at bedtime.      varenicline tartrate (Chantix) 1 mg tablet TAKE 1 TABLET (1 MG) BY MOUTH 2 TIMES A DAY. TAKE WITH FULL GLASS OF WATER 60 tablet 5     No current facility-administered medications for this visit.

## 2025-05-06 NOTE — Clinical Note
Hi Dr. Busby, The patient plans to continue care with you so she has scheduled a follow-up with you next week. I ordered labs for her to complete since her last labs at the ER for a stomach flu and explained the [facility] procedure. I wanted to check if there are any other labs you would like for her to complete prior to your appt? Thanks! Binta Jimenez, PharmD

## 2025-05-08 ENCOUNTER — APPOINTMENT (OUTPATIENT)
Dept: PRIMARY CARE | Facility: CLINIC | Age: 60
End: 2025-05-08
Payer: COMMERCIAL

## 2025-05-09 ENCOUNTER — TELEPHONE (OUTPATIENT)
Dept: OPHTHALMOLOGY | Facility: CLINIC | Age: 60
End: 2025-05-09
Payer: COMMERCIAL

## 2025-05-09 DIAGNOSIS — E11.9 TYPE 2 DIABETES MELLITUS WITHOUT RETINOPATHY (MULTI): ICD-10-CM

## 2025-05-09 NOTE — TELEPHONE ENCOUNTER
No show appt 5/2/25.   Spoke with patient she scheduled appt with another doctor, no longer wants to schedule with Dr. Benitez.

## 2025-05-13 ENCOUNTER — APPOINTMENT (OUTPATIENT)
Dept: OPHTHALMOLOGY | Facility: CLINIC | Age: 60
End: 2025-05-13
Payer: COMMERCIAL

## 2025-05-13 ENCOUNTER — APPOINTMENT (OUTPATIENT)
Dept: PRIMARY CARE | Facility: CLINIC | Age: 60
End: 2025-05-13
Payer: COMMERCIAL

## 2025-05-13 DIAGNOSIS — H52.203 HYPEROPIA OF BOTH EYES WITH ASTIGMATISM AND PRESBYOPIA: Primary | ICD-10-CM

## 2025-05-13 DIAGNOSIS — H52.03 HYPEROPIA OF BOTH EYES WITH ASTIGMATISM AND PRESBYOPIA: Primary | ICD-10-CM

## 2025-05-13 DIAGNOSIS — H52.4 HYPEROPIA OF BOTH EYES WITH ASTIGMATISM AND PRESBYOPIA: Primary | ICD-10-CM

## 2025-05-13 DIAGNOSIS — H25.13 AGE-RELATED NUCLEAR CATARACT OF BOTH EYES: ICD-10-CM

## 2025-05-13 DIAGNOSIS — E11.9 TYPE 2 DIABETES MELLITUS WITHOUT RETINOPATHY (MULTI): ICD-10-CM

## 2025-05-13 PROCEDURE — 92015 DETERMINE REFRACTIVE STATE: CPT | Performed by: OPTOMETRIST

## 2025-05-13 PROCEDURE — 92014 COMPRE OPH EXAM EST PT 1/>: CPT | Performed by: OPTOMETRIST

## 2025-05-13 RX ORDER — BLOOD SUGAR DIAGNOSTIC
STRIP MISCELLANEOUS
Refills: 11 | OUTPATIENT
Start: 2025-05-13

## 2025-05-13 ASSESSMENT — REFRACTION_MANIFEST
OD_AXIS: 095
OS_SPHERE: +1.00
OS_AXIS: 075
OS_ADD: +2.50
METHOD_AUTOREFRACTION: 1
OD_CYLINDER: -1.25
OS_SPHERE: +1.50
OD_ADD: +2.75
OS_CYLINDER: -1.25
OS_CYLINDER: -1.25
OS_CYLINDER: -1.50
OS_AXIS: 085
OS_AXIS: 075
OD_AXIS: 090
OD_CYLINDER: -1.00
OD_AXIS: 095
OD_SPHERE: +1.50
OS_SPHERE: +1.50
OD_ADD: +2.50
OD_SPHERE: +1.50
OS_ADD: +2.75
OD_SPHERE: +1.50
OD_CYLINDER: -0.75

## 2025-05-13 ASSESSMENT — REFRACTION_WEARINGRX
OS_CYLINDER: +0.50
OS_ADD: +2.00
OD_SPHERE: +0.25
OD_AXIS: 010
OS_AXIS: 170
OS_SPHERE: +0.50
OD_ADD: +2.00
OD_CYLINDER: +0.75

## 2025-05-13 ASSESSMENT — TONOMETRY
OD_IOP_MMHG: 15
OS_IOP_MMHG: 18
IOP_METHOD: TONOPEN

## 2025-05-13 ASSESSMENT — CUP TO DISC RATIO
OS_RATIO: .3
OD_RATIO: .3

## 2025-05-13 ASSESSMENT — ENCOUNTER SYMPTOMS
GASTROINTESTINAL NEGATIVE: 0
CONSTITUTIONAL NEGATIVE: 0
CARDIOVASCULAR NEGATIVE: 0
MUSCULOSKELETAL NEGATIVE: 0
ENDOCRINE NEGATIVE: 1
NEUROLOGICAL NEGATIVE: 0
ALLERGIC/IMMUNOLOGIC NEGATIVE: 0
RESPIRATORY NEGATIVE: 0
PSYCHIATRIC NEGATIVE: 0
EYES NEGATIVE: 1
HEMATOLOGIC/LYMPHATIC NEGATIVE: 0

## 2025-05-13 ASSESSMENT — CONF VISUAL FIELD
OD_INFERIOR_TEMPORAL_RESTRICTION: 0
OD_SUPERIOR_TEMPORAL_RESTRICTION: 0
OS_SUPERIOR_NASAL_RESTRICTION: 0
OD_INFERIOR_NASAL_RESTRICTION: 0
OS_INFERIOR_NASAL_RESTRICTION: 0
OD_NORMAL: 1
OS_SUPERIOR_TEMPORAL_RESTRICTION: 0
OS_NORMAL: 1
OD_SUPERIOR_NASAL_RESTRICTION: 0
METHOD: COUNTING FINGERS
OS_INFERIOR_TEMPORAL_RESTRICTION: 0

## 2025-05-13 ASSESSMENT — VISUAL ACUITY
METHOD: SNELLEN - LINEAR
OS_SC: 20/30
OD_SC: 20/30
OD_PH_SC+: -3
OD_PH_SC: 20/20

## 2025-05-13 ASSESSMENT — EXTERNAL EXAM - RIGHT EYE: OD_EXAM: NORMAL

## 2025-05-13 ASSESSMENT — SLIT LAMP EXAM - LIDS
COMMENTS: GOOD POSITION, MILD MGD
COMMENTS: GOOD POSITION, MILD MGD

## 2025-05-13 ASSESSMENT — EXTERNAL EXAM - LEFT EYE: OS_EXAM: NORMAL

## 2025-05-13 NOTE — ASSESSMENT & PLAN NOTE
1.5+ NS OU.  Not visually significant.  Pt educated on findings and importance of UV protection when outdoors. Recommend anti-glare coating in lenses to reduce symptoms of night glare. Continue to monitor annually at this time. Pt voiced understanding.

## 2025-05-13 NOTE — PROGRESS NOTES
Assessment/Plan   Problem List Items Addressed This Visit          Eye/Vision problems    Type 2 diabetes mellitus without retinopathy (Multi)    No diabetic retinopathy OU. No macular edema OU.  Pt educated on findings and importance of maintaining a low A1C and FBS to prevent vision loss from diabetic retinopathy. Continue care with PCP as directed. Monitor annually with dilated fundus examination. Pt voiced understanding.         Hyperopia of both eyes with astigmatism and presbyopia - Primary    Mild change from habitual Rx.  Pt educated on findings. Release Rx for full time wear. Discussed adaptation. Monitor annually. Pt voiced understanding.          Age-related nuclear cataract of both eyes    1.5+ NS OU.  Not visually significant.  Pt educated on findings and importance of UV protection when outdoors. Recommend anti-glare coating in lenses to reduce symptoms of night glare. Continue to monitor annually at this time. Pt voiced understanding.

## 2025-05-19 DIAGNOSIS — E11.9 TYPE 2 DIABETES MELLITUS WITHOUT RETINOPATHY (MULTI): ICD-10-CM

## 2025-05-20 ENCOUNTER — APPOINTMENT (OUTPATIENT)
Dept: PHARMACY | Facility: HOSPITAL | Age: 60
End: 2025-05-20
Payer: COMMERCIAL

## 2025-05-20 DIAGNOSIS — F17.210 CIGARETTE NICOTINE DEPENDENCE WITHOUT COMPLICATION: ICD-10-CM

## 2025-05-20 DIAGNOSIS — E11.9 TYPE 2 DIABETES MELLITUS WITHOUT RETINOPATHY (MULTI): ICD-10-CM

## 2025-05-20 RX ORDER — MICONAZOLE NITRATE 2 %
2 CREAM (GRAM) TOPICAL EVERY 2 HOUR PRN
Qty: 100 EACH | Refills: 3 | Status: SHIPPED | OUTPATIENT
Start: 2025-05-20

## 2025-05-20 RX ORDER — METFORMIN HYDROCHLORIDE 500 MG/1
TABLET, EXTENDED RELEASE ORAL
Qty: 90 TABLET | Refills: 11 | OUTPATIENT
Start: 2025-05-20

## 2025-05-20 RX ORDER — BLOOD-GLUCOSE SENSOR
EACH MISCELLANEOUS
Qty: 6 EACH | Refills: 3 | Status: SHIPPED | OUTPATIENT
Start: 2025-05-20

## 2025-05-20 RX ORDER — METFORMIN HYDROCHLORIDE 500 MG/1
TABLET, EXTENDED RELEASE ORAL
Qty: 90 TABLET | Refills: 1 | Status: SHIPPED | OUTPATIENT
Start: 2025-05-20

## 2025-05-20 NOTE — Clinical Note
Patient's BG seems to be stable but unable to truly assess. Provided education on BG tracking and lifestyle modifications. Will try nicotine gum to help with cravings for smoking cessation. We also discussed for her to complete lab work prior to her appointment with you. Thanks! Binta Jimenez, PharmD

## 2025-05-20 NOTE — PROGRESS NOTES
Subjective      Chief Complaint   Patient presents with    Left Shoulder - Follow-up, Pain        HPI  This 59 year old patient presents for re-evaluation and treatment of left shoulder/upper arm pain (9/10) loss of motion and loss of strength. MRI results of the left shoulder were reviewed in detail with the patient at this office visit. She was initially evaluated in the [facility] Emergency Department on 1/17/25 for complaints of left upper arm pain that had been present since August 2024. Her pain had flared up the week prior to her ER visit, primarily with overhead activity. She currently rates left shoulder/upper extremity pain at 9/10. She denies any recent injury or trauma to her left upper extremity. She did have a vascular US performed on 1/17/25 which was normal, and xrays performed 8/2024 were negative for fracture. Additionally, she has had CT scans of her cervical, thoracic, and lumbar spine in November 2024.  She received a cortisone injection into her left shoulder at her last office visit on 3/11/25 and reports that she had no relief of symptoms. She presents today to discuss further treatment options. She feels persistent left shoulder pain, loss of motion and loss of strength and has difficulty performing her normal activities of daily living. There has been no improvement in pain since the last visit. She was attending physical therapy but stopped going because it was not alleviating her pain and actually made her pain worse.    CARDIOLOGY:   Negative for chest pain, shortness of breath.   RESPIRATORY:   Negative for chest pain, shortness of breath.   MUSCULOSKELETAL:   See HPI for details.   NEUROLOGY:   Negative for tingling, numbness, weakness.    Objective    There were no vitals filed for this visit.    Physical Exam  GENERAL:          General Appearance:  This is a pleasant patient with appropriate affect, in no acute distress.   DERMATOLOGY:          Skin: skin at the neck, upper and lower back,  and trunk is intact. There is no evidence of skin rash, skin breakdown or ulceration, or atrophic skin change.   EXTREMITIES:          Vascular:  Right, left hands and feet are warm with good color and pulses. Right and left calf and thigh are nontender and nonswollen.   NEUROLOGICAL:          Orientation:  Patient is alert and oriented to person, place, time and situation. Right and left upper and lower extremity motor and sensory examinations are intact.      MUSCULOSKELETAL: Neck: Nontender. No pain with range of motion. Back: No tenderness. Straight leg test negative bilaterally. Right and left hips: Nontender. No pain or limitation with ROM. Left  shoulder: There is tenderness anteriorly and laterally. Active abduction and active flexion are 0-75 degrees but with pain and guarding and decreased strength. Empty can test is positive. Drop arm test is positive. The distal biceps is palpable. There is pain with and limitation of active and passive internal and external rotation. Right shoulder: nontender. No pain or limitation with ROM.   No results found.    X-rays of the left shoulder done on 8- show: IMPRESSION:   No acute fracture or dislocation.  Degenerative changes of the left   shoulder.   No results found.  MRI of the left shoulder done on 4- is reviewed with the patient in the office today and shows near full-thickness full width per spinatus and infraspinatus tendon tears with only few possibly intact fibers.  I reviewed the MRI with the patient in the office today.  CT of the cervical spine done on 11-:  IMPRESSION:     1. No acute intracranial findings.   2.  No acute fracture of the cervical spine.   3.  No acute fracture of the thoracic spine.   4.  No acute fracture of the lumbar spine.     Patient ID: Linnette Ventura is a 59 y.o. female.           Linnette was seen today for follow-up and pain.  Diagnoses and all orders for this visit:  Chronic left shoulder pain  (Primary)  -     Request for Pre-Admission Testing Visit; Future  -     Case Request Operating Room: REPAIR, ROTATOR CUFF; Standing  -     Full code; Standing  -     Place in outpatient/hospital ambulatory surgery; Standing  -     Urinalysis with Reflex Culture and Microscopic; Future  -     NPO Diet Except: Sips with meds; Effective now; Standing  -     Height and weight; Standing  -     Insert and maintain peripheral IV; Standing  -     Saline lock IV; Standing  -     POCT Glucose; Standing  -     Type And Screen; Standing  -     Inpatient consult to Respiratory Care; Standing  -     povidone-iodine 5 % kit kit  -     ceFAZolin (Ancef) 2 g in dextrose (iso)  mL  -     Case Request Operating Room: REPAIR, ROTATOR CUFF  -     naproxen (Naprosyn) 500 mg tablet; Take 1 tablet (500 mg) by mouth 2 times a day.  Primary osteoarthritis, left shoulder  -     naproxen (Naprosyn) 500 mg tablet; Take 1 tablet (500 mg) by mouth 2 times a day.  Primary osteoarthritis of cervical spine  Incomplete tear of left rotator cuff, unspecified whether traumatic  -     Request for Pre-Admission Testing Visit; Future  -     Case Request Operating Room: REPAIR, ROTATOR CUFF; Standing  -     Full code; Standing  -     Place in outpatient/hospital ambulatory surgery; Standing  -     Urinalysis with Reflex Culture and Microscopic; Future  -     NPO Diet Except: Sips with meds; Effective now; Standing  -     Height and weight; Standing  -     Insert and maintain peripheral IV; Standing  -     Saline lock IV; Standing  -     POCT Glucose; Standing  -     Type And Screen; Standing  -     Inpatient consult to Respiratory Care; Standing  -     povidone-iodine 5 % kit kit  -     ceFAZolin (Ancef) 2 g in dextrose (iso)  mL  -     Case Request Operating Room: REPAIR, ROTATOR CUFF  Biceps tendonitis on left  Subscapularis tendinitis of left shoulder  Tear of left supraspinatus tendon      Options are discussed with the patient in detail.  The patient is instructed regarding activity modification and risk for further injury with falling or trauma and the use of a left arm sling for protection and support, ice, and the appropriate use of Tylenol as needed for pain with its potential adverse reactions and side effects. The patient understands. He states that despite all of the treatment listed above, that this left shoulder pain is disabling and impairs the patient's ability to do activities of daily living that are important such as duties at the patient's job. The patient is concerned regarding risk for further injury from this persistent left shoulder instability. The patient requests a discussion of further options including surgical options. On physical examination the patient has limitation with ROM of the left shoulder and pain with ROM of the left shoulder. MRI of the left shoulder shows that there is a nearly full thickness rotator cuff tear with only a few fibers intact..  Options are discussed with the patient in detail. Left rotator cuff repair/reconstruction with indications, alternatives, potential risks including but not limited to risk of infection, blood clot, blood loss, nerve or blood vessel damage, stroke or death, benefits, unforseen risks, the rehab involved and the fact that no guarantee can be made were all discussed with the patient in detail. The patient understands, accepts and wishes to proceed with left rotator cuff repair/reconstruction as the patient cannot complete normal activities of daily living that are important to the patient including activities of daily living and duties at the patients job. I agree. We will be setting this up for a time that is convenient to the patient and as the schedule allows.Please note that this report has been produced using speech recognition software. It may contain errors related to grammar, punctuation or spelling. Electronically signed, but not reviewed.  Jamil Worrell MD

## 2025-05-20 NOTE — PROGRESS NOTES
Patient ID: Linnette Ventura is a 59 y.o. female who presents for No chief complaint on file..    PCP/Referring Provider: Laura Busby MD - last visit: 5/23/24, next visit: 2/20/25. 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 She confirms she will be will be establishing with her now    Subjective   No Known Allergies  HPI    Interval History: 2 years ago    Diabetes Mellitus Type 2  Known complications due to diabetes included chronic kidney disease and obesity    Current Diabetes Medications:   Farxiga 10mg daily  Trulicity 4.5mg weekly on Sun  No side effects  No decrease in appetite  Metformin ER 500mg - 2 tabs QAM and and 1 tab QPM with food  No noticeable side effects    Historical Diabetes Medications:  -medication, duration of therapy, reason discontinued:  lantus 8 units once daily (at night, maybe takes it 3-4 times per week)   humalog 6 units three times daily (patient takes it if she has starch in a meal, or if she has a big meal; she takes it maybe 2 times per week)   Metformin (afraid of side effects)    SMBG Measurements  Patient is using: both glucometer and continuous glucose monitor Freestyle Nanci 2  The patient is currently checking the blood glucose >4 times per day.  Hypoglycemia  Patient reports none.  Hypoglycemia frequency: none  Hypoglycemia awareness: No   Reports 0 episodes of hypoglycemia for a while  Hyperglycemia  Patient reports excessive thirst, fatigue, polyuria, weakness, and nausea.  FBG (avg): 115 (highest)  PPBG (avg): 120-140s  highest 160 after drinking a free smoothie after work   After drinking soda 300 --> 167 took a few hours  Bedtime BG (avg): 110-115    Diet: Eats 2 times throughout the day. Eats what she wants and a lot carbohydrates/starches lately  Breakfast: skips often  Brunch: hebert, eggs, ham, leftovers  Dinner: chicken, fish, takeout, fast food  Snacks: all day on chips, cookies, candy  Fluids:  improved but may not be enough on water intake (several large cups oz of water per day), still drinks Pepsi 12 oz x2 per day)  Difficulty stopping soda. Attempting to not refrigerate it so she won't drink it. Believes she likes the carbonation    Physical Activity   None, mostly walking to and at work (20 minutes)     Pertinent PMH Review:  PMH of Pancreatitis: No  PMH of Retinopathy: No  PMH of Urinary Tract Infections: Yes, currently. Frequently lately due to high BG.   PMH of MTC: No    Primary Prevention  The 10-year ASCVD risk score (Sandor MCNAIR, et al., 2019) is: 20.6%    Values used to calculate the score:      Age: 59 years      Sex: Female      Is Non- : Yes      Diabetic: Yes      Tobacco smoker: Yes      Systolic Blood Pressure: 124 mmHg      Is BP treated: Yes      HDL Cholesterol: 49.9 mg/dL      Total Cholesterol: 169 mg/dL    Statin? Yes - atorvastatin 40mg  ACE-I/ARB? No  Aspirin? No  Last eye exam: 2024  Last diabetic foot exam: >1 year  Has 0 sores/cuts on feet today    TOBACCO  Tobacco Use:  Patient currently reports usin cigarettes/day. When she works, she may only smoke 1 cig. Sometimes smokes within or after 30 min of waking up.   Current treatment:   Chantix 1mg daily  Denies any vivid dreams  buPROPion XL 300mg daily (for depression)  Reports she feels great and has inquired if she might be able to stop it, but psych recommended against stopping  Nicotine 7mg patch   Has not been effective. Interested in trying the gum  Notices that when she's busy, she smokes less. Typically just smokes when she is home    AFFORDABILITY/ADHERENCE   [cost or adherence barriers]  Trulicity had been on back order but is available now. Pt has medicaid    Review of Systems    Objective   LMP  (LMP Unknown)    BP Readings from Last 4 Encounters:   25 124/84   24 130/72   24 136/80   24 130/80      There were no vitals filed for this visit.     LAB  Lab Results    Component Value Date    BILITOT 0.3 10/31/2024    CALCIUM 9.8 10/31/2024    CO2 27 10/31/2024     10/31/2024    CREATININE 1.22 (H) 10/31/2024    GLUCOSE 254 (H) 10/31/2024    ALKPHOS 133 (H) 10/31/2024    K 4.7 10/31/2024    PROT 7.8 10/31/2024     10/31/2024    AST 15 10/31/2024    ALT 20 10/31/2024    BUN 21 10/31/2024    ANIONGAP 16 10/31/2024    MG 2.07 10/31/2024    ALBUMIN 4.3 10/31/2024    GFRF 48 (A) 08/17/2023     Lab Results   Component Value Date    TRIG 184 (H) 10/31/2024    CHOL 169 10/31/2024    LDLCALC 82 10/31/2024    HDL 49.9 10/31/2024     Lab Results   Component Value Date    HGBA1C 8.6 (A) 02/11/2025     A comprehensive medication review was completed with the patient. Patient had all medications and/or an updated medication list in front of them during the telephone encounter.     MEDICATION RECONCILIATION  Added: none  Changed: buproprion XL 150mg -> buproprion XL 300mg daily  Removed: freestyle nanci 2 and Nanci 3. ( will dc)    DRUG INTERACTIONS  -renal function with metformin use    Assessment/Plan    Comments/Recommendations to PCP:    Current Outpatient Medications   Medication Instructions    amLODIPine (NORVASC) 10 mg, oral, Daily    atorvastatin (LIPITOR) 40 mg, oral, Daily    blood sugar diagnostic (OneTouch Ultra Test) USE TO TEST BLOOD SUGAR 4 TIMES DAILY    blood-glucose sensor (FreeStyle Nanci 3 Plus Sensor) device Use to check blood sugar throughout the day and change every 15 days    buPROPion XL (WELLBUTRIN XL) 300 mg, Daily    cyclobenzaprine (FLEXERIL) 10 mg, oral, Every 8 hours PRN    dapagliflozin propanediol (FARXIGA) 10 mg, oral, Daily    ergocalciferol (VITAMIN D-2) 50,000 Units, oral, Weekly    gabapentin (NEURONTIN) 300 mg, oral, Nightly    lancets misc Use to check blood sugar 4 times daily    metFORMIN XR (Glucophage-XR) 500 mg 24 hr tablet Take 2 tablets (1,000 mg) by mouth once daily with breakfast AND 1 tablet (500 mg) once daily in the  evening. Take with meals. Do not crush, chew, or split.    metoprolol succinate XL (TOPROL-XL) 50 mg, oral, Daily    mv-min/iron/folic/calcium/vitK (WOMEN'S MULTIVITAMIN ORAL) 1 tablet, Daily    nicotine polacrilex (NICORETTE) 2 mg, Mouth/Throat, Every 2 hour PRN, Max of 24 pieces per day    omeprazole (PRILOSEC) 20 mg, oral, Daily before breakfast    prazosin (MINIPRESS) 1 mg, Nightly    traZODone (DESYREL) 200 mg, Nightly    Trulicity 4.5 mg, subcutaneous, Weekly    varenicline tartrate (CHANTIX) 1 mg, oral, 2 times daily, Take with full glass of water        [facility] [place] [facility] [address]  [address] [address]  Phone: [phone] Fax: [phone]    [facility] [address]  [address]  [address]  [facility] [address]  Phone: [phone] Fax: [phone]    [facility]  [address], [address]  [facility] [address]  Phone: [phone] Fax: [phone]    [facility]/pharmacy #[id number] - [place], [place] - [address] [address]  [address] [address] [address]  Phone: [phone] Fax: [phone]      DRUG INTERACTIONS  None requiring intervention at this time    Assessment/Plan   Problem List Items Addressed This Visit       Type 2 diabetes mellitus without retinopathy (Multi)    Relevant Medications    blood-glucose sensor (FreeStyle Nanci 3 Plus Sensor) device    metFORMIN XR (Glucophage-XR) 500 mg 24 hr tablet    Other Relevant Orders    Vitamin B12    Referral to Clinical Pharmacy    Nicotine dependence, unspecified, uncomplicated    Relevant Medications    nicotine polacrilex (Nicorette) 2 mg gum    Other Relevant Orders    Referral to Clinical Pharmacy         Patient identified self-management goal:  BG control, Patients diabetes is poorly controlled, needs to quit smoking, and needs to follow diet more regularly   Patient's goal A1c is < 7%.  Is pt at goal? No, 8.6% as of 2/11/24    Patient's SMBGs are improved but still elevated  Complications: Obesity, neuropathy, smoker, alcohol dependence, CKD  Rationale for plan: Patient's current blood sugar seems to be stable despite high spike in last reported BG. She is not completely sure how her sugars looks since she has not been keeping track but it had previously been elevated at bedtime. No side effects with current regimen, and BG have improved. Has not noticed weight loss. Will plan to continue Trulicity, Farxiga, and metformin ER for BG control with additional monitoring. Focus on lifestyle modifications such as switching soda to sparkling water and increased exercise  Medication Changes:  CONTINUE:  Farxiga 10mg daily  Trulicity 4.5mg weekly (-)  Metformin ER 500mg - 2 tabs QAM and 1 tab QPM with food  FUTURE CONSIDERATIONS:   Plan to increase metformin to 2 tabs BID if BG is still elevated and if tolerated   Compliance at present is estimated to be excellent. Efforts to improve compliance (if necessary) will be directed at dietary modifications: decreased carbohydrates, increased protein/vegetables/fiber, increased exercise, regular blood sugar monitorin-3 times daily, and smoking cessation.  [employer] invitation was sent to patient to connect  Manufacture of Nanci 2 sensor will be discontinued/no longer covered. So will switch and send over new Rx for Nanci 3 plus sensors  Patient will receive another email on information related to diabetes, Nanci, smoking cessation  Smoking cessation. Smoking has remained the same at 1-2 cig/day.   CONTINUE:  Chantix 1mg twice daily  bupropion XL 300mg daily (for depression per psych)  START:  nicotine 2 mg gum as needed for cravings. Max of 24 gum daily  STOP:  Nicotine patch 7mg daily  Education Provided to Patient: decrease soda intake and increase hydration due to renal function, smoking cessation, [employer] vini, diet and exercise.      Pharm Follow-up:  @9:30 am for BG control and  metformin tolerance. Due for labs and check renal function     Binta Jimenez, PharmD   Clinical Pharmacy Specialist  Phone: [phone]  Fax: [phone]    Continue all meds under the continuation of care with the referring provider and clinical pharmacy team.    Verbal consent to manage patient's drug therapy was obtained from the patient. They were informed they may decline to participate or withdraw from participation in pharmacy services at any time.

## 2025-05-23 ENCOUNTER — OFFICE VISIT (OUTPATIENT)
Dept: ORTHOPEDIC SURGERY | Facility: CLINIC | Age: 60
End: 2025-05-23
Payer: COMMERCIAL

## 2025-05-23 ENCOUNTER — HOSPITAL ENCOUNTER (OUTPATIENT)
Facility: HOSPITAL | Age: 60
Setting detail: OUTPATIENT SURGERY
End: 2025-05-23
Attending: ORTHOPAEDIC SURGERY | Admitting: ORTHOPAEDIC SURGERY
Payer: COMMERCIAL

## 2025-05-23 VITALS — BODY MASS INDEX: 34.62 KG/M2 | HEIGHT: 65 IN | WEIGHT: 207.8 LBS

## 2025-05-23 DIAGNOSIS — M19.012 PRIMARY OSTEOARTHRITIS, LEFT SHOULDER: ICD-10-CM

## 2025-05-23 DIAGNOSIS — M75.102 TEAR OF LEFT SUPRASPINATUS TENDON: ICD-10-CM

## 2025-05-23 DIAGNOSIS — M25.512 CHRONIC LEFT SHOULDER PAIN: Primary | ICD-10-CM

## 2025-05-23 DIAGNOSIS — G89.29 CHRONIC LEFT SHOULDER PAIN: Primary | ICD-10-CM

## 2025-05-23 DIAGNOSIS — M75.22 BICEPS TENDONITIS ON LEFT: ICD-10-CM

## 2025-05-23 DIAGNOSIS — M47.812 PRIMARY OSTEOARTHRITIS OF CERVICAL SPINE: ICD-10-CM

## 2025-05-23 DIAGNOSIS — M77.8 SUBSCAPULARIS TENDINITIS OF LEFT SHOULDER: ICD-10-CM

## 2025-05-23 DIAGNOSIS — M75.112 INCOMPLETE TEAR OF LEFT ROTATOR CUFF, UNSPECIFIED WHETHER TRAUMATIC: ICD-10-CM

## 2025-05-23 PROCEDURE — 3052F HG A1C>EQUAL 8.0%<EQUAL 9.0%: CPT | Performed by: ORTHOPAEDIC SURGERY

## 2025-05-23 PROCEDURE — 3008F BODY MASS INDEX DOCD: CPT | Performed by: ORTHOPAEDIC SURGERY

## 2025-05-23 PROCEDURE — 99214 OFFICE O/P EST MOD 30 MIN: CPT | Performed by: ORTHOPAEDIC SURGERY

## 2025-05-23 RX ORDER — CEFAZOLIN SODIUM 2 G/100ML
2 INJECTION, SOLUTION INTRAVENOUS ONCE
OUTPATIENT
Start: 2025-05-23 | End: 2025-05-23

## 2025-05-23 RX ORDER — NAPROXEN 500 MG/1
500 TABLET ORAL 2 TIMES DAILY
Qty: 60 TABLET | Refills: 0 | Status: SHIPPED | OUTPATIENT
Start: 2025-05-23 | End: 2025-06-22

## 2025-05-23 ASSESSMENT — PAIN SCALES - GENERAL
PAINLEVEL_OUTOF10: 6
PAINLEVEL_OUTOF10: 6

## 2025-05-23 ASSESSMENT — ENCOUNTER SYMPTOMS
OCCASIONAL FEELINGS OF UNSTEADINESS: 0
LOSS OF SENSATION IN FEET: 0
DEPRESSION: 0

## 2025-05-23 ASSESSMENT — LIFESTYLE VARIABLES
HAVE YOU OR SOMEONE ELSE BEEN INJURED AS A RESULT OF YOUR DRINKING: NO
HOW OFTEN DURING THE LAST YEAR HAVE YOU FAILED TO DO WHAT WAS NORMALLY EXPECTED FROM YOU BECAUSE OF DRINKING: NEVER
SKIP TO QUESTIONS 9-10: 1
HOW OFTEN DURING THE LAST YEAR HAVE YOU HAD A FEELING OF GUILT OR REMORSE AFTER DRINKING: NEVER
HAS A RELATIVE, FRIEND, DOCTOR, OR ANOTHER HEALTH PROFESSIONAL EXPRESSED CONCERN ABOUT YOUR DRINKING OR SUGGESTED YOU CUT DOWN: NO
HOW OFTEN DO YOU HAVE A DRINK CONTAINING ALCOHOL: NEVER
AUDIT TOTAL SCORE: 0
HOW OFTEN DO YOU HAVE SIX OR MORE DRINKS ON ONE OCCASION: NEVER
HOW OFTEN DURING THE LAST YEAR HAVE YOU BEEN UNABLE TO REMEMBER WHAT HAPPENED THE NIGHT BEFORE BECAUSE YOU HAD BEEN DRINKING: NEVER
HOW OFTEN DURING THE LAST YEAR HAVE YOU FOUND THAT YOU WERE NOT ABLE TO STOP DRINKING ONCE YOU HAD STARTED: NEVER
HOW MANY STANDARD DRINKS CONTAINING ALCOHOL DO YOU HAVE ON A TYPICAL DAY: PATIENT DOES NOT DRINK
HOW OFTEN DURING THE LAST YEAR HAVE YOU NEEDED AN ALCOHOLIC DRINK FIRST THING IN THE MORNING TO GET YOURSELF GOING AFTER A NIGHT OF HEAVY DRINKING: NEVER
AUDIT-C TOTAL SCORE: 0

## 2025-05-23 ASSESSMENT — COLUMBIA-SUICIDE SEVERITY RATING SCALE - C-SSRS
1. IN THE PAST MONTH, HAVE YOU WISHED YOU WERE DEAD OR WISHED YOU COULD GO TO SLEEP AND NOT WAKE UP?: NO
6. HAVE YOU EVER DONE ANYTHING, STARTED TO DO ANYTHING, OR PREPARED TO DO ANYTHING TO END YOUR LIFE?: NO
2. HAVE YOU ACTUALLY HAD ANY THOUGHTS OF KILLING YOURSELF?: NO

## 2025-05-23 ASSESSMENT — PAIN - FUNCTIONAL ASSESSMENT: PAIN_FUNCTIONAL_ASSESSMENT: 0-10

## 2025-05-23 ASSESSMENT — PAIN DESCRIPTION - DESCRIPTORS: DESCRIPTORS: ACHING;SORE

## 2025-05-27 ENCOUNTER — TELEPHONE (OUTPATIENT)
Dept: ORTHOPEDIC SURGERY | Facility: CLINIC | Age: 60
End: 2025-05-27
Payer: COMMERCIAL

## 2025-05-27 NOTE — TELEPHONE ENCOUNTER
Spoke to patient gave all new dates and time for surgery .    Pre-testing      7-9   145  [place]   Surgery            7-23     [place]   Post op            8-5-      5930  [facility]

## 2025-06-04 DIAGNOSIS — E11.65 TYPE 2 DIABETES MELLITUS WITH HYPERGLYCEMIA, WITHOUT LONG-TERM CURRENT USE OF INSULIN: Primary | ICD-10-CM

## 2025-06-04 RX ORDER — INSULIN PUMP SYRINGE, 3 ML
EACH MISCELLANEOUS
Qty: 1 EACH | Refills: 1 | Status: SHIPPED | OUTPATIENT
Start: 2025-06-04

## 2025-06-04 RX ORDER — CALCIUM CITRATE/VITAMIN D3 200MG-6.25
1 TABLET ORAL DAILY
Qty: 102 STRIP | Refills: 11 | Status: SHIPPED | OUTPATIENT
Start: 2025-06-04

## 2025-06-18 RX ORDER — FLASH GLUCOSE SENSOR
KIT MISCELLANEOUS
Qty: 2 EACH | Refills: 3 | Status: SHIPPED | OUTPATIENT
Start: 2025-06-18

## 2025-06-24 ENCOUNTER — OFFICE VISIT (OUTPATIENT)
Dept: PRIMARY CARE | Facility: CLINIC | Age: 60
End: 2025-06-24
Payer: COMMERCIAL

## 2025-06-24 ENCOUNTER — APPOINTMENT (OUTPATIENT)
Dept: PHARMACY | Facility: HOSPITAL | Age: 60
End: 2025-06-24
Payer: COMMERCIAL

## 2025-06-24 DIAGNOSIS — E11.9 TYPE 2 DIABETES MELLITUS WITHOUT RETINOPATHY (MULTI): ICD-10-CM

## 2025-06-24 DIAGNOSIS — F17.210 CIGARETTE NICOTINE DEPENDENCE WITHOUT COMPLICATION: ICD-10-CM

## 2025-06-24 DIAGNOSIS — E11.65 TYPE 2 DIABETES MELLITUS WITH HYPERGLYCEMIA, WITHOUT LONG-TERM CURRENT USE OF INSULIN: ICD-10-CM

## 2025-06-24 PROCEDURE — RXMED WILLOW AMBULATORY MEDICATION CHARGE

## 2025-06-24 RX ORDER — MICONAZOLE NITRATE 2 %
2 CREAM (GRAM) TOPICAL EVERY 2 HOUR PRN
Qty: 100 EACH | Refills: 3 | Status: SHIPPED | OUTPATIENT
Start: 2025-06-24

## 2025-06-24 ASSESSMENT — ENCOUNTER SYMPTOMS
LOSS OF SENSATION IN FEET: 0
OCCASIONAL FEELINGS OF UNSTEADINESS: 0
DEPRESSION: 0

## 2025-06-24 NOTE — ASSESSMENT & PLAN NOTE
Smoking cessation. Smoking has remained the same at 1-2 cig/day.   CONTINUE:  Chantix 1mg twice daily  bupropion XL 300mg daily (for depression per psych)  START:  nicotine 2 mg gum as needed for cravings. Max of 24 gum daily  STOP:  Nicotine patch 7mg daily  Education Provided to Patient: decrease soda intake and increase hydration due to renal function, smoking cessation, [employer] vini, diet and exercise.

## 2025-06-24 NOTE — ASSESSMENT & PLAN NOTE
Patient identified self-management goal:  BG control, Patients diabetes is poorly controlled, needs to quit smoking, and needs to follow diet more regularly   Patient's goal A1c is < 7%.  Is pt at goal? No, 8.6% as of 24 due  Patient's SMBGs are improved but still elevated  Complications: Obesity, neuropathy, smoker, alcohol dependence, CKD  Rationale for plan: Patient's current blood sugar seems to be stable while she starts to watch what she eats and drinks. No side effects with current regimen, and BG have improved. Has not noticed weight loss but is satisfied. Will plan to continue current Trulicity, Farxiga, and metformin ER for BG control with additional monitoring. Focus on lifestyle modifications such as switching soda to sparkling water and increased exercise  Medication Changes:  CONTINUE:  Farxiga 10mg daily  Trulicity 4.5mg weekly (-)  Metformin ER 500mg - 2 tabs QAM and 1 tab QPM with food  FUTURE CONSIDERATIONS:   Plan to increase metformin to 2 tabs BID if BG is still elevated and if tolerated   Compliance at present is estimated to be excellent. Efforts to improve compliance (if necessary) will be directed at dietary modifications: decreased carbohydrates, increased protein/vegetables/fiber, increased exercise, regular blood sugar monitorin-3 times daily, and smoking cessation.  [employer] invitation was sent to patient to connect  Manufacture of Nanci 2 sensor will be discontinued/no longer covered. So will switch and send over new Rx for Nanci 3 plus sensors  Primary prevention: ASCVD 20.6%. On statin but no ACEi or ARB which may benefit renal function in addition to Farxiga. Due for labs to check proteinuria, renal function, LFTs, B12 levels, lipids, etc. Discussed lab work and [facility] process  CONTINUE: atorvastatin 40mg daily  Monitor LFTs while on statin

## 2025-06-24 NOTE — Clinical Note
Patient is currently doing well with her diabetes regimen, so we discussed for her to complete lab work and the process through [facility]. Will re-send nicotine gum to [facility] for delivery to help with smoking cessation and follow-up in a few weeks. Thanks! Binta Jimenez, PharmD

## 2025-06-24 NOTE — PROGRESS NOTES
Patient ID: Linnette Ventura is a 59 y.o. female who presents for Diabetes and Nicotine Dependence.    PCP/Referring Provider: Laura Busby MD - last visit: 5/23/24, next visit: 2/20/25. 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 She confirms she will be will be establishing with her now    Subjective   No Known Allergies  HPI    Interval History: 2 years ago    Diabetes Mellitus Type 2  Known complications due to diabetes included chronic kidney disease and obesity    Current Diabetes Medications:   Farxiga 10mg daily  Trulicity 4.5mg weekly on Sun  No side effects  No decrease in appetite  Metformin ER 500mg - 2 tabs QAM and and 1 tab QPM with food  No noticeable side effects    Historical Diabetes Medications:  -medication, duration of therapy, reason discontinued:  lantus 8 units once daily (at night, maybe takes it 3-4 times per week)   humalog 6 units three times daily (patient takes it if she has starch in a meal, or if she has a big meal; she takes it maybe 2 times per week)   Metformin (afraid of side effects)    SMBG Measurements  Patient is using: both glucometer and continuous glucose monitor Freestyle Nanci 2  The patient is currently checking the blood glucose >4 times per day.  Hypoglycemia  Patient reports none.  Hypoglycemia frequency: none  Hypoglycemia awareness: No   Reports 0 episodes of hypoglycemia for a while  Hyperglycemia  Patient reports excessive thirst, fatigue, polyuria, weakness, and nausea.  FBG (avg): 120s (highest)  PPBG (avg): 140s-160s  highest 180 after drinking a free smoothie after work   Bedtime BG (avg): 110-115    Diet: Eats 2 times throughout the day. Eats what she wants and a lot carbohydrates/starches lately.   Breakfast: skips often  Brunch: hebert, eggs, ham, leftovers  Dinner: chicken, fish, takeout, fast food  Snacks: all day on chips, cookies, candy  Fluids: increased water intake (several large cups oz  of water per day), still drinks Pepsi 4-8oz per day)  Difficulty stopping soda. Attempting to not refrigerate it so she won't drink it. Believes she likes the carbonation    Physical Activity   None, mostly walking to and at work (20 minutes). Has a new job     Baseline: 230 lbs --> current 208 lbs  Wt Readings from Last 5 Encounters:   25 94.3 kg (207 lb 12.8 oz)   25 93.4 kg (206 lb)   25 93.4 kg (206 lb)   25 90.7 kg (200 lb)   24 94.8 kg (209 lb)     Pertinent PMH Review:  PMH of Pancreatitis: No  PMH of Retinopathy: No  PMH of Urinary Tract Infections: No  Previously Yes. Frequently due to high BG.   PMH of MTC: No    Primary Prevention  The 10-year ASCVD risk score (Sandor MCNAIR, et al., 2019) is: 20.6%    Values used to calculate the score:      Age: 59 years      Sex: Female      Is Non- : Yes      Diabetic: Yes      Tobacco smoker: Yes      Systolic Blood Pressure: 124 mmHg      Is BP treated: Yes      HDL Cholesterol: 49.9 mg/dL      Total Cholesterol: 169 mg/dL    Statin? Yes - atorvastatin 40mg  ACE-I/ARB? No  Aspirin? No  Last eye exam: 2024  Last diabetic foot exam: >1 year  Has 0 sores/cuts on feet today    TOBACCO  Tobacco Use:  Patient currently reports usin cigarettes/day. Doesn't smoke for 5 hours. When she works, she may only smoke 1 cig. Sometimes smokes 0.5 cig within or after 30 min of waking up.   Current treatment:   Chantix 1mg daily  Denies any vivid dreams  buPROPion XL 300mg daily (for depression)  Reports she feels great and has inquired if she might be able to stop it, but psych recommended against stopping  Nicotine 7mg patch   Has not been effective. Interested in trying the gum which has still not been dispensed to her  Notices that when she's busy, she smokes less. Typically just smokes when she is home    AFFORDABILITY/ADHERENCE   [cost or adherence barriers]  Trulicity had been on back order but is available now. Pt has  medicaid    Review of Systems    Objective   LMP  (LMP Unknown)    BP Readings from Last 4 Encounters:   02/11/25 124/84   11/05/24 130/72   05/23/24 136/80   02/22/24 130/80      There were no vitals filed for this visit.     LAB  Lab Results   Component Value Date    BILITOT 0.3 10/31/2024    CALCIUM 9.8 10/31/2024    CO2 27 10/31/2024     10/31/2024    CREATININE 1.22 (H) 10/31/2024    GLUCOSE 254 (H) 10/31/2024    ALKPHOS 133 (H) 10/31/2024    K 4.7 10/31/2024    PROT 7.8 10/31/2024     10/31/2024    AST 15 10/31/2024    ALT 20 10/31/2024    BUN 21 10/31/2024    ANIONGAP 16 10/31/2024    MG 2.07 10/31/2024    ALBUMIN 4.3 10/31/2024    GFRF 48 (A) 08/17/2023     Lab Results   Component Value Date    TRIG 184 (H) 10/31/2024    CHOL 169 10/31/2024    LDLCALC 82 10/31/2024    HDL 49.9 10/31/2024     Lab Results   Component Value Date    HGBA1C 8.6 (A) 02/11/2025     A comprehensive medication review was completed with the patient. Patient had all medications and/or an updated medication list in front of them during the telephone encounter.     MEDICATION RECONCILIATION  Added: none  Changed: buproprion XL 150mg -> buproprion XL 300mg daily  Removed: freestyle nanci 2 and Nanci 3. ( will dc)    DRUG INTERACTIONS  -renal function with metformin use    Assessment/Plan    Comments/Recommendations to PCP:    Current Outpatient Medications   Medication Instructions    amLODIPine (NORVASC) 10 mg, oral, Daily    atorvastatin (LIPITOR) 40 mg, oral, Daily    Blood glucose monitoring (True Metrix Glucose Meter) meter Testing daily    blood sugar diagnostic (True Metrix Glucose Test Strip) 1 strip, miscellaneous, Daily    buPROPion XL (WELLBUTRIN XL) 300 mg, Daily    cyclobenzaprine (FLEXERIL) 10 mg, oral, Every 8 hours PRN    dapagliflozin propanediol (FARXIGA) 10 mg, oral, Daily    ergocalciferol (VITAMIN D-2) 50,000 Units, oral, Weekly    flash glucose sensor kit (FreeStyle Nanci 2 Sensor) kit USE TO  MONITOR BLOOD SUGAR AS DIRECTED. CHANGE SENSOR EVERY 14 DAYS.    gabapentin (NEURONTIN) 300 mg, oral, Nightly    lancets misc Use to check blood sugar 4 times daily    metFORMIN XR (Glucophage-XR) 500 mg 24 hr tablet Take 2 tablets (1,000 mg) by mouth once daily with breakfast AND 1 tablet (500 mg) once daily in the evening. Take with meals. Do not crush, chew, or split.    metoprolol succinate XL (TOPROL-XL) 50 mg, oral, Daily    mv-min/iron/folic/calcium/vitK (WOMEN'S MULTIVITAMIN ORAL) 1 tablet, Daily    nicotine polacrilex (NICORETTE) 2 mg, Mouth/Throat, Every 2 hour PRN, Max of 24 pieces per day    omeprazole (PRILOSEC) 20 mg, oral, Daily before breakfast    prazosin (MINIPRESS) 1 mg, Nightly    traZODone (DESYREL) 200 mg, Nightly    Trulicity 4.5 mg, subcutaneous, Weekly        [facility] [place] - [address] [id number]  Phone: [phone] Fax: [phone]    [facility] [address]  [address]  [address]  [facility] [address]  Phone: [phone] Fax: [phone]    [facility]  [address]  [facility] [address]  Phone: [phone] Fax: [phone]    [facility]/pharmacy #[id number] - [place], [place] - [address] [address]  [address] [address]  Phone: [phone] Fax: [phone]      DRUG INTERACTIONS  None requiring intervention at this time    Assessment/Plan   Problem List Items Addressed This Visit       Type 2 diabetes mellitus without retinopathy (Multi)    Patient identified self-management goal:  BG control, Patients diabetes is poorly controlled, needs to quit smoking, and needs to follow diet more regularly   Patient's goal A1c is < 7%.  Is pt at goal? No, 8.6% as of 2/11/24 due  Patient's SMBGs are improved but still elevated  Complications: Obesity, neuropathy, smoker, alcohol dependence, CKD  Rationale for plan: Patient's current blood sugar  seems to be stable while she starts to watch what she eats and drinks. No side effects with current regimen, and BG have improved. Has not noticed weight loss but is satisfied. Will plan to continue current Trulicity, Farxiga, and metformin ER for BG control with additional monitoring. Focus on lifestyle modifications such as switching soda to sparkling water and increased exercise  Medication Changes:  CONTINUE:  Farxiga 10mg daily  Trulicity 4.5mg weekly (-)  Metformin ER 500mg - 2 tabs QAM and 1 tab QPM with food  FUTURE CONSIDERATIONS:   Plan to increase metformin to 2 tabs BID if BG is still elevated and if tolerated   Compliance at present is estimated to be excellent. Efforts to improve compliance (if necessary) will be directed at dietary modifications: decreased carbohydrates, increased protein/vegetables/fiber, increased exercise, regular blood sugar monitorin-3 times daily, and smoking cessation.  [employer] invitation was sent to patient to connect  Manufacture of Nanci 2 sensor will be discontinued/no longer covered. So will switch and send over new Rx for Nanci 3 plus sensors  Primary prevention: ASCVD 20.6%. On statin but no ACEi or ARB which may benefit renal function in addition to Farxiga. Due for labs to check proteinuria, renal function, LFTs, B12 levels, lipids, etc. Discussed lab work and [facility] process  CONTINUE: atorvastatin 40mg daily  Monitor LFTs while on statin         Relevant Orders    Referral to Clinical Pharmacy    Nicotine dependence, unspecified, uncomplicated    Smoking cessation. Smoking has remained the same at 1-2 cig/day.   CONTINUE:  Chantix 1mg twice daily  bupropion XL 300mg daily (for depression per psych)  START:  nicotine 2 mg gum as needed for cravings. Max of 24 gum daily  STOP:  Nicotine patch 7mg daily  Education Provided to Patient: decrease soda intake and increase hydration due to renal function, smoking cessation, [employer] vini, diet and exercise.           Relevant Medications    nicotine polacrilex (Nicorette) 2 mg gum    Other Relevant Orders    Referral to Clinical Pharmacy      Pharm Follow-up: 7/15 @9:30 am for BG control and DM meds tolerance. Due for labs     Binta Jimenez PharmD   Clinical Pharmacy Specialist  Phone: [phone]  Fax: [phone]    Continue all meds under the continuation of care with the referring provider and clinical pharmacy team.    Verbal consent to manage patient's drug therapy was obtained from the patient. They were informed they may decline to participate or withdraw from participation in pharmacy services at any time.

## 2025-06-26 ENCOUNTER — APPOINTMENT (OUTPATIENT)
Dept: PRIMARY CARE | Facility: CLINIC | Age: 60
End: 2025-06-26
Payer: COMMERCIAL

## 2025-06-26 ENCOUNTER — APPOINTMENT (OUTPATIENT)
Dept: OPHTHALMOLOGY | Facility: CLINIC | Age: 60
End: 2025-06-26
Payer: COMMERCIAL

## 2025-06-26 ENCOUNTER — PHARMACY VISIT (OUTPATIENT)
Dept: PHARMACY | Facility: CLINIC | Age: 60
End: 2025-06-26
Payer: MEDICAID

## 2025-06-26 LAB
ALBUMIN SERPL-MCNC: 4.5 G/DL (ref 3.6–5.1)
ALP SERPL-CCNC: 100 U/L (ref 37–153)
ALT SERPL-CCNC: 28 U/L (ref 6–29)
ANION GAP SERPL CALCULATED.4IONS-SCNC: 12 MMOL/L (CALC) (ref 7–17)
AST SERPL-CCNC: 22 U/L (ref 10–35)
BILIRUB SERPL-MCNC: 0.4 MG/DL (ref 0.2–1.2)
BUN SERPL-MCNC: 17 MG/DL (ref 7–25)
CALCIUM SERPL-MCNC: 9.7 MG/DL (ref 8.6–10.4)
CHLORIDE SERPL-SCNC: 105 MMOL/L (ref 98–110)
CHOLEST SERPL-MCNC: 156 MG/DL
CHOLEST/HDLC SERPL: 3.4 (CALC)
CO2 SERPL-SCNC: 24 MMOL/L (ref 20–32)
CREAT SERPL-MCNC: 1.5 MG/DL (ref 0.5–1.03)
EGFRCR SERPLBLD CKD-EPI 2021: 40 ML/MIN/1.73M2
EST. AVERAGE GLUCOSE BLD GHB EST-MCNC: 246 MG/DL
EST. AVERAGE GLUCOSE BLD GHB EST-SCNC: 13.6 MMOL/L
GLUCOSE SERPL-MCNC: 155 MG/DL (ref 65–99)
HBA1C MFR BLD: 10.2 %
HDLC SERPL-MCNC: 46 MG/DL
LDLC SERPL CALC-MCNC: 82 MG/DL (CALC)
NONHDLC SERPL-MCNC: 110 MG/DL (CALC)
POTASSIUM SERPL-SCNC: 4.5 MMOL/L (ref 3.5–5.3)
PROT SERPL-MCNC: 7.6 G/DL (ref 6.1–8.1)
SODIUM SERPL-SCNC: 141 MMOL/L (ref 135–146)
TRIGL SERPL-MCNC: 181 MG/DL
VIT B12 SERPL-MCNC: 381 PG/ML (ref 200–1100)

## 2025-07-01 ENCOUNTER — APPOINTMENT (OUTPATIENT)
Dept: PRIMARY CARE | Facility: CLINIC | Age: 60
End: 2025-07-01
Payer: COMMERCIAL

## 2025-07-01 DIAGNOSIS — E11.65 TYPE 2 DIABETES MELLITUS WITH HYPERGLYCEMIA, WITHOUT LONG-TERM CURRENT USE OF INSULIN: ICD-10-CM

## 2025-07-02 ENCOUNTER — OFFICE VISIT (OUTPATIENT)
Dept: PRIMARY CARE | Facility: CLINIC | Age: 60
End: 2025-07-02
Payer: COMMERCIAL

## 2025-07-02 VITALS
WEIGHT: 208 LBS | DIASTOLIC BLOOD PRESSURE: 72 MMHG | SYSTOLIC BLOOD PRESSURE: 124 MMHG | HEIGHT: 65 IN | BODY MASS INDEX: 34.66 KG/M2 | HEART RATE: 57 BPM | OXYGEN SATURATION: 97 % | TEMPERATURE: 97 F

## 2025-07-02 DIAGNOSIS — I10 PRIMARY HYPERTENSION: ICD-10-CM

## 2025-07-02 DIAGNOSIS — J44.9 CHRONIC OBSTRUCTIVE PULMONARY DISEASE, UNSPECIFIED COPD TYPE (MULTI): ICD-10-CM

## 2025-07-02 DIAGNOSIS — M54.12 CERVICAL RADICULOPATHY: ICD-10-CM

## 2025-07-02 DIAGNOSIS — E11.9 TYPE 2 DIABETES MELLITUS WITHOUT RETINOPATHY (MULTI): ICD-10-CM

## 2025-07-02 DIAGNOSIS — K21.9 GASTROESOPHAGEAL REFLUX DISEASE, UNSPECIFIED WHETHER ESOPHAGITIS PRESENT: ICD-10-CM

## 2025-07-02 DIAGNOSIS — E08.00 DIABETES MELLITUS DUE TO UNDERLYING CONDITION WITH HYPEROSMOLARITY WITHOUT COMA, UNSPECIFIED WHETHER LONG TERM INSULIN USE: ICD-10-CM

## 2025-07-02 DIAGNOSIS — N18.32 STAGE 3B CHRONIC KIDNEY DISEASE (MULTI): Primary | ICD-10-CM

## 2025-07-02 DIAGNOSIS — E78.5 HYPERLIPIDEMIA, UNSPECIFIED HYPERLIPIDEMIA TYPE: ICD-10-CM

## 2025-07-02 DIAGNOSIS — E11.65 TYPE 2 DIABETES MELLITUS WITH HYPERGLYCEMIA, WITHOUT LONG-TERM CURRENT USE OF INSULIN: ICD-10-CM

## 2025-07-02 DIAGNOSIS — F14.21: ICD-10-CM

## 2025-07-02 PROCEDURE — 99214 OFFICE O/P EST MOD 30 MIN: CPT | Performed by: INTERNAL MEDICINE

## 2025-07-02 PROCEDURE — 3052F HG A1C>EQUAL 8.0%<EQUAL 9.0%: CPT | Performed by: INTERNAL MEDICINE

## 2025-07-02 PROCEDURE — 3008F BODY MASS INDEX DOCD: CPT | Performed by: INTERNAL MEDICINE

## 2025-07-02 PROCEDURE — 3074F SYST BP LT 130 MM HG: CPT | Performed by: INTERNAL MEDICINE

## 2025-07-02 PROCEDURE — 3078F DIAST BP <80 MM HG: CPT | Performed by: INTERNAL MEDICINE

## 2025-07-02 RX ORDER — ATORVASTATIN CALCIUM 40 MG/1
40 TABLET, FILM COATED ORAL DAILY
Qty: 90 TABLET | Refills: 0 | Status: SHIPPED | OUTPATIENT
Start: 2025-07-02

## 2025-07-02 RX ORDER — OMEPRAZOLE 20 MG/1
20 CAPSULE, DELAYED RELEASE ORAL
Qty: 90 CAPSULE | Refills: 0 | Status: SHIPPED | OUTPATIENT
Start: 2025-07-02

## 2025-07-02 RX ORDER — DAPAGLIFLOZIN 10 MG/1
10 TABLET, FILM COATED ORAL DAILY
Qty: 90 TABLET | Refills: 0 | Status: SHIPPED | OUTPATIENT
Start: 2025-07-02

## 2025-07-02 RX ORDER — METFORMIN HYDROCHLORIDE 500 MG/1
TABLET, EXTENDED RELEASE ORAL
Qty: 90 TABLET | Refills: 0 | Status: SHIPPED | OUTPATIENT
Start: 2025-07-02

## 2025-07-02 RX ORDER — DULAGLUTIDE 4.5 MG/.5ML
4.5 INJECTION, SOLUTION SUBCUTANEOUS WEEKLY
Qty: 6 ML | Refills: 0 | Status: SHIPPED | OUTPATIENT
Start: 2025-07-02

## 2025-07-02 RX ORDER — AMLODIPINE BESYLATE 10 MG/1
10 TABLET ORAL DAILY
Qty: 90 TABLET | Refills: 0 | Status: SHIPPED | OUTPATIENT
Start: 2025-07-02

## 2025-07-02 RX ORDER — METOPROLOL SUCCINATE 50 MG/1
50 TABLET, EXTENDED RELEASE ORAL DAILY
Qty: 90 TABLET | Refills: 0 | Status: SHIPPED | OUTPATIENT
Start: 2025-07-02

## 2025-07-02 RX ORDER — GABAPENTIN 300 MG/1
300 CAPSULE ORAL NIGHTLY
Qty: 30 CAPSULE | Refills: 1 | Status: SHIPPED | OUTPATIENT
Start: 2025-07-02 | End: 2025-08-31

## 2025-07-02 ASSESSMENT — ENCOUNTER SYMPTOMS
DYSURIA: 0
OCCASIONAL FEELINGS OF UNSTEADINESS: 0
UNEXPECTED WEIGHT CHANGE: 0
TROUBLE SWALLOWING: 0
PALPITATIONS: 0
BACK PAIN: 0
SHORTNESS OF BREATH: 0
SEIZURES: 0
FREQUENCY: 0
ARTHRALGIAS: 1
NUMBNESS: 0
BLOOD IN STOOL: 0
VOMITING: 0
MYALGIAS: 0
SINUS PRESSURE: 0
ABDOMINAL PAIN: 0
LOSS OF SENSATION IN FEET: 0
TREMORS: 0
WHEEZING: 0
NAUSEA: 0
CHILLS: 0
FATIGUE: 0
DEPRESSION: 0
COUGH: 0

## 2025-07-02 ASSESSMENT — PAIN SCALES - GENERAL: PAINLEVEL_OUTOF10: 5

## 2025-07-02 NOTE — PROGRESS NOTES
"Subjective   Patient ID: Linnette Ventura is a 59 y.o. female who presents for DIscuss A1C results.    HPI      PMH remarkable for diabetes, HLD, HTN, CKD stage 2, depression and nicotine dependence     H/O diabetes mellitus-   Denied any hypoglycemic episodes    H/O tobacco dependence- smokes 2-3 cigarettes a day since last few months    Left upper arm pain since August 2024, she saw orthopedics, who ordered US of biceps tendon, has left sided neck and shoulder blade pain  Took prednisone and flexeril with no relief  Not sure what started her pain  Denied any fall, pain gradually getting worse since it started last year    CKD- sees nephrologist    Stopped using cocaine 4 years ago. Attending CA and AA meetings    Review of Systems   Constitutional:  Negative for chills, fatigue and unexpected weight change.   HENT:  Negative for postnasal drip, sinus pressure and trouble swallowing.    Respiratory:  Negative for cough, shortness of breath and wheezing.    Cardiovascular:  Negative for chest pain, palpitations and leg swelling.   Gastrointestinal:  Negative for abdominal pain, blood in stool, nausea and vomiting.   Genitourinary:  Negative for dysuria and frequency.   Musculoskeletal:  Positive for arthralgias (left shoulder anf shoulder blade pain). Negative for back pain and myalgias.   Skin:  Negative for rash.   Neurological:  Negative for tremors, seizures and numbness.   Psychiatric/Behavioral:  Negative for behavioral problems.        Objective   /72 (BP Location: Left arm, Patient Position: Sitting, BP Cuff Size: Large adult)   Pulse 57   Temp 36.1 °C (97 °F) (Temporal)   Ht 1.651 m (5' 5\")   Wt 94.3 kg (208 lb)   LMP  (LMP Unknown)   SpO2 97%   BMI 34.61 kg/m²     Physical Exam    Assessment/Plan       Linnette was seen today for discuss a1c results.  Diagnoses and all orders for this visit:  Stage 3b chronic kidney disease (Multi) (Primary)  -     Basic Metabolic Panel; Future  Type 2 " diabetes mellitus with hyperglycemia, without long-term current use of insulin  -     Albumin-Creatinine Ratio, Urine Random  -     Hemoglobin A1C; Future  Primary hypertension  -     amLODIPine (Norvasc) 10 mg tablet; Take 1 tablet (10 mg) by mouth once daily.  -     metoprolol succinate XL (Toprol-XL) 50 mg 24 hr tablet; Take 1 tablet (50 mg) by mouth once daily.  Hyperlipidemia, unspecified hyperlipidemia type  -     atorvastatin (Lipitor) 40 mg tablet; Take 1 tablet (40 mg) by mouth once daily.  Type 2 diabetes mellitus without retinopathy (Multi)  -     dapagliflozin propanediol (Farxiga) 10 mg tablet; Take 1 tablet (10 mg) by mouth once daily.  -     dulaglutide (Trulicity) 4.5 mg/0.5 mL pen injector; Inject 4.5 mg under the skin 1 (one) time per week.  -     metFORMIN XR (Glucophage-XR) 500 mg 24 hr tablet; Take 2 tablets (1,000 mg) by mouth once daily with breakfast AND 1 tablet (500 mg) once daily in the evening. Take with meals. Do not crush, chew, or split.  Cervical radiculopathy  -     gabapentin (Neurontin) 300 mg capsule; Take 1 capsule (300 mg) by mouth once daily at bedtime.  Gastroesophageal reflux disease, unspecified whether esophagitis present  -     omeprazole (PriLOSEC) 20 mg DR capsule; Take 1 capsule (20 mg) by mouth once daily in the morning. Take before meals.    Not compliant with trulicity and ADA diet  Discussed side effects of uncontrolled blood sugars advised compliance with meds and diet    Past Medical History:   Diagnosis Date    Acute vaginitis 2018    BV (bacterial vaginosis)    Arthritis     Chronic kidney disease     COPD (chronic obstructive pulmonary disease) (Multi)     Depression 1985    Eczema     GERD (gastroesophageal reflux disease)     Gestational diabetes     History of uterine scar from previous surgery 2016    History of 2  sections    Hypertension 2010    Pain in left knee 2020    Knee pain, left    Patellofemoral  disorders, left knee 2019    Patellofemoral syndrome, left    Personal history of other diseases of the female genital tract 2018    History of vaginal discharge    Personal history of other specified conditions 2021    History of fatigue    Pityriasis versicolor 2017    Tinea versicolor    Substance abuse 1990    Superficial mycosis, unspecified 2016    Fungal dermatitis    Syphilis 2000       Past Surgical History:   Procedure Laterality Date     SECTION, CLASSIC  2016     Section     SECTION, LOW TRANSVERSE      MR HEAD ANGIO WO IV CONTRAST  2020    MR HEAD ANGIO WO IV CONTRAST 2020 [facility]    MR NECK ANGIO WO IV CONTRAST  2020    MR NECK ANGIO WO IV CONTRAST 2020 [facility]    TUBAL LIGATION      WISDOM TOOTH EXTRACTION             [facility]  Outside Information  Results  CT cervical spine wo IV contrast (Order [id number])     CT cervical spine wo IV contrast  Order: [id number]  Impression    IMPRESSION:    1.  No acute intracranial findings.  2.  No acute fracture of the cervical spine.  3.  No acute fracture of the thoracic spine.  4.  No acute fracture of the lumbar spine.        Transcribed Using Voice Recognition  Transcribe Date/Time: 2024  3:17P    Dictated by: SYD ESCOBAR MD    This examination was interpreted and the report reviewed and  electronically signed by:  SYD ESCOBAR MD on 2024  4:03PM  EST  Narrative    * * *Final Report* * *    DATE OF EXAM: 2024  3:04PM      [facility]   0505  -  CT CERVICAL SPINE WO IVCON  / ACCESSION #  [id number]    PROCEDURE REASON: Spine fracture, cervical, traumatic        * * * * Physician Interpretation * * * *    RESULT: EXAMINATION: CT BRAIN WO IVCON, CT CERVICAL SPINE WO IVCON, CT  LUMBAR SPINE WO IVCON, CT THORACIC SPINE WO IVCON    CLINICAL HISTORY: Fall.    TECHNIQUE:  Serial axial images without IV contrast were obtained  from  the vertex to the sacrum with sagittal and coronal planar reconstructions.    CT Radiation dose: Integrated Dose-Length Product (DLP) for this visit =  2733  mGy*cm  CT Dose Reduction Employed: Automated exposure control(AEC) and iterative  recon    COMPARISON:    Partial comparison to CT dated 05/03/2012.    FINDINGS:    There is no abnormal extra-axial fluid, intracranial hemorrhage, mass,  mass effect, or midline shift.  The ventricular system is unremarkable.    Cisterns are patent.  Gray-white differentiation is maintained.  Mild  right greater than left deep white matter hypodensities.  Paranasal  sinuses and mastoid air cells are unopacified.  Calvarium is intact.    There is no prevertebral soft tissue edema.    The atlantooccipital and atlantoaxial articulations are maintained.    No significant listhesis.    Vertebral body heights are maintained.    Mild multilevel loss of disc height most pronounced C5-C6 with associated  minimal endplate remodeling.    Facet alignment is maintained.    The posterior elements are intact.    Partially visualized sacrum and sacroiliac joints are maintained.  Exam End: 11/22/24 15:04    Specimen Collected: 11/22/24 15:04 Last Resulted: 11/22/24 16:05   Received From: [facility]  Result Received: 12/05/24 10:02        [facility]  Outside Information  Results  CT thoracic spine wo IV contrast (Order [id number])     CT thoracic spine wo IV contrast  Order: [id number]  Impression    IMPRESSION:    1.  No acute intracranial findings.  2.  No acute fracture of the cervical spine.  3.  No acute fracture of the thoracic spine.  4.  No acute fracture of the lumbar spine.        Transcribed Using Voice Recognition  Transcribe Date/Time: Nov 22 2024  3:17P    Dictated by: SYD ESCOBAR MD    This examination was interpreted and the report reviewed and  electronically signed by:  SYD ESCOBAR MD on Nov 22 2024  4:03PM  EST  Narrative    * * *Final Report* * *    DATE OF  EXAM: Nov 22 2024  3:04PM      [facility]   0514  -  CT THORACIC SPINE WO IVCON  / ACCESSION #  [id number]    PROCEDURE REASON: Spine fracture, thoracic, traumatic        * * * * Physician Interpretation * * * *    RESULT: EXAMINATION: CT BRAIN WO IVCON, CT CERVICAL SPINE WO IVCON, CT  LUMBAR SPINE WO IVCON, CT THORACIC SPINE WO IVCON    CLINICAL HISTORY: Fall.    TECHNIQUE:  Serial axial images without IV contrast were obtained from  the vertex to the sacrum with sagittal and coronal planar reconstructions.    CT Radiation dose: Integrated Dose-Length Product (DLP) for this visit =  2733  mGy*cm  CT Dose Reduction Employed: Automated exposure control(AEC) and iterative  recon    COMPARISON:    Partial comparison to CT dated 05/03/2012.    FINDINGS:    There is no abnormal extra-axial fluid, intracranial hemorrhage, mass,  mass effect, or midline shift.  The ventricular system is unremarkable.    Cisterns are patent.  Gray-white differentiation is maintained.  Mild  right greater than left deep white matter hypodensities.  Paranasal  sinuses and mastoid air cells are unopacified.  Calvarium is intact.    There is no prevertebral soft tissue edema.    The atlantooccipital and atlantoaxial articulations are maintained.    No significant listhesis.    Vertebral body heights are maintained.    Mild multilevel loss of disc height most pronounced C5-C6 with associated  minimal endplate remodeling.    Facet alignment is maintained.    The posterior elements are intact.    Partially visualized sacrum and sacroiliac joints are maintained.  Exam End: 11/22/24 15:04    Specimen Collected: 11/22/24 15:04 Last Resulted: 11/22/24 16:05   Received From: [facility]  Result Received: 12/05/24 10:02         Lab Results   Component Value Date    HGBA1C 10.2 (H) 06/25/2025       Current Outpatient Medications   Medication Instructions    amLODIPine (NORVASC) 10 mg, oral, Daily    atorvastatin (LIPITOR) 40 mg, oral, Daily    Blood glucose  monitoring (True Metrix Glucose Meter) meter Testing daily    blood sugar diagnostic (True Metrix Glucose Test Strip) 1 strip, miscellaneous, Daily    buPROPion XL (WELLBUTRIN XL) 300 mg, Daily    cyclobenzaprine (FLEXERIL) 10 mg, oral, Every 8 hours PRN    dapagliflozin propanediol (FARXIGA) 10 mg, oral, Daily    ergocalciferol (VITAMIN D-2) 50,000 Units, oral, Weekly    flash glucose sensor kit (FreeStyle Nanci 2 Sensor) kit USE TO MONITOR BLOOD SUGAR AS DIRECTED. CHANGE SENSOR EVERY 14 DAYS.    gabapentin (NEURONTIN) 300 mg, oral, Nightly    lancets misc Use to check blood sugar 4 times daily    metFORMIN XR (Glucophage-XR) 500 mg 24 hr tablet Take 2 tablets (1,000 mg) by mouth once daily with breakfast AND 1 tablet (500 mg) once daily in the evening. Take with meals. Do not crush, chew, or split.    metoprolol succinate XL (TOPROL-XL) 50 mg, oral, Daily    mv-min/iron/folic/calcium/vitK (WOMEN'S MULTIVITAMIN ORAL) 1 tablet, Daily    nicotine polacrilex (NICORETTE) 2 mg, Mouth/Throat, Every 2 hour PRN, Max of 24 pieces per day    omeprazole (PRILOSEC) 20 mg, oral, Daily before breakfast    prazosin (MINIPRESS) 1 mg, Nightly    traZODone (DESYREL) 200 mg, Nightly    Trulicity 4.5 mg, subcutaneous, Weekly

## 2025-07-03 LAB
ALBUMIN/CREAT UR: 199 MG/G CREAT
CREAT UR-MCNC: 120 MG/DL (ref 20–275)
MICROALBUMIN UR-MCNC: 23.9 MG/DL

## 2025-07-08 PROBLEM — F14.21: Status: ACTIVE | Noted: 2025-07-08

## 2025-07-08 PROBLEM — E08.00: Status: ACTIVE | Noted: 2025-07-08

## 2025-07-08 PROBLEM — J44.9 CHRONIC OBSTRUCTIVE PULMONARY DISEASE, UNSPECIFIED COPD TYPE (MULTI): Status: ACTIVE | Noted: 2025-07-08

## 2025-07-09 ENCOUNTER — APPOINTMENT (OUTPATIENT)
Dept: PREADMISSION TESTING | Facility: HOSPITAL | Age: 60
End: 2025-07-09
Payer: COMMERCIAL

## 2025-07-15 ENCOUNTER — APPOINTMENT (OUTPATIENT)
Dept: PHARMACY | Facility: HOSPITAL | Age: 60
End: 2025-07-15
Payer: COMMERCIAL

## 2025-07-22 ENCOUNTER — APPOINTMENT (OUTPATIENT)
Dept: OPHTHALMOLOGY | Facility: CLINIC | Age: 60
End: 2025-07-22
Payer: COMMERCIAL

## 2025-07-31 ENCOUNTER — APPOINTMENT (OUTPATIENT)
Dept: PREADMISSION TESTING | Facility: HOSPITAL | Age: 60
End: 2025-07-31
Payer: COMMERCIAL

## 2025-08-05 ENCOUNTER — APPOINTMENT (OUTPATIENT)
Dept: ORTHOPEDIC SURGERY | Facility: CLINIC | Age: 60
End: 2025-08-05
Payer: COMMERCIAL

## 2025-08-06 ENCOUNTER — APPOINTMENT (OUTPATIENT)
Dept: RADIOLOGY | Facility: HOSPITAL | Age: 60
End: 2025-08-06
Payer: COMMERCIAL

## 2025-08-06 ENCOUNTER — HOSPITAL ENCOUNTER (EMERGENCY)
Facility: HOSPITAL | Age: 60
Discharge: HOME | End: 2025-08-06
Attending: EMERGENCY MEDICINE
Payer: COMMERCIAL

## 2025-08-06 VITALS
WEIGHT: 208 LBS | DIASTOLIC BLOOD PRESSURE: 85 MMHG | OXYGEN SATURATION: 99 % | HEART RATE: 72 BPM | TEMPERATURE: 97.3 F | SYSTOLIC BLOOD PRESSURE: 135 MMHG | HEIGHT: 65 IN | BODY MASS INDEX: 34.66 KG/M2 | RESPIRATION RATE: 16 BRPM

## 2025-08-06 DIAGNOSIS — S83.92XA SPRAIN OF LEFT KNEE, UNSPECIFIED LIGAMENT, INITIAL ENCOUNTER: Primary | ICD-10-CM

## 2025-08-06 DIAGNOSIS — I10 DIASTOLIC HYPERTENSION: ICD-10-CM

## 2025-08-06 PROCEDURE — 73564 X-RAY EXAM KNEE 4 OR MORE: CPT | Mod: LEFT SIDE | Performed by: RADIOLOGY

## 2025-08-06 PROCEDURE — 2500000001 HC RX 250 WO HCPCS SELF ADMINISTERED DRUGS (ALT 637 FOR MEDICARE OP): Performed by: EMERGENCY MEDICINE

## 2025-08-06 PROCEDURE — 99283 EMERGENCY DEPT VISIT LOW MDM: CPT | Performed by: EMERGENCY MEDICINE

## 2025-08-06 PROCEDURE — 73564 X-RAY EXAM KNEE 4 OR MORE: CPT | Mod: LT

## 2025-08-06 RX ORDER — ACETAMINOPHEN 325 MG/1
650 TABLET ORAL EVERY 6 HOURS PRN
Qty: 20 TABLET | Refills: 0 | Status: SHIPPED | OUTPATIENT
Start: 2025-08-06 | End: 2025-08-11

## 2025-08-06 RX ORDER — TRAMADOL HYDROCHLORIDE 50 MG/1
50 TABLET, FILM COATED ORAL ONCE
Status: COMPLETED | OUTPATIENT
Start: 2025-08-06 | End: 2025-08-06

## 2025-08-06 RX ORDER — TRAMADOL HYDROCHLORIDE 50 MG/1
50 TABLET, FILM COATED ORAL EVERY 6 HOURS PRN
Qty: 12 TABLET | Refills: 0 | Status: SHIPPED | OUTPATIENT
Start: 2025-08-06 | End: 2025-08-09

## 2025-08-06 RX ORDER — IBUPROFEN 400 MG/1
400 TABLET, FILM COATED ORAL ONCE
Status: DISCONTINUED | OUTPATIENT
Start: 2025-08-06 | End: 2025-08-06 | Stop reason: HOSPADM

## 2025-08-06 RX ORDER — ACETAMINOPHEN 325 MG/1
650 TABLET ORAL ONCE
Status: COMPLETED | OUTPATIENT
Start: 2025-08-06 | End: 2025-08-06

## 2025-08-06 RX ADMIN — ACETAMINOPHEN 650 MG: 325 TABLET ORAL at 07:27

## 2025-08-06 RX ADMIN — TRAMADOL HYDROCHLORIDE 50 MG: 50 TABLET, COATED ORAL at 07:27

## 2025-08-06 ASSESSMENT — PAIN DESCRIPTION - DESCRIPTORS: DESCRIPTORS: ACHING

## 2025-08-06 ASSESSMENT — PAIN DESCRIPTION - ORIENTATION: ORIENTATION: LEFT

## 2025-08-06 ASSESSMENT — PAIN DESCRIPTION - PAIN TYPE: TYPE: ACUTE PAIN

## 2025-08-06 ASSESSMENT — PAIN DESCRIPTION - LOCATION: LOCATION: KNEE

## 2025-08-06 ASSESSMENT — PAIN SCALES - GENERAL: PAINLEVEL_OUTOF10: 7

## 2025-08-06 ASSESSMENT — PAIN - FUNCTIONAL ASSESSMENT: PAIN_FUNCTIONAL_ASSESSMENT: 0-10

## 2025-08-06 NOTE — Clinical Note
Linnette HughesAbbeye was seen and treated in our emergency department on 8/6/2025.  She may return to work on 08/07/2025.  No prolonged standing, walking     If you have any questions or concerns, please don't hesitate to call.      Claudette Goldberg MD

## 2025-08-06 NOTE — DISCHARGE INSTRUCTIONS
Follow-up with your primary care physician within 1 to 2 days for further management of your current symptoms, repeat check of your blood pressure, and to discuss a possible referral to physical therapy.    Follow-up with orthopedics within 1 week for further management of your knee pain      Do not drive within 8 hours of last dose of pain medication      Return to the emergency department sooner with worsening symptoms or onset of new symptoms

## 2025-08-06 NOTE — ED PROVIDER NOTES
Emergency Department Provider Note       History of Present Illness     History provided by: Patient  Limitations to History: None  External Records Reviewed with Brief Summary: None        Physical Exam   Triage vitals:  T 36.3 °C (97.3 °F)  HR 72  /85  RR 16  O2 99 % None (Room air)          Medical Decision Making & ED Course     The patient is a 60-year-old female presenting to the emergency department for evaluation of left knee pain.  The patient states that she twisted her knee 5 days ago.  She states that she has had pain with walking, standing and moving her left knee since then.  She did not fall.  She denies any headache or visual changes.  No neck or back pain.  No chest pain or shortness of breath.  No abdominal pain.  No nausea, vomiting or diarrhea.  No urinary complaints.  All pertinent positives and negatives are recorded above.  All other systems reviewed and otherwise negative.  Vital signs with diastolic hypertension but otherwise within normal limits.  Physical exam with a well-nourished well-developed female in no acute distress.  HEENT exam within normal limits.  She has no evidence of airway compromise or respiratory distress.  Abdominal exam is fine.  She does not have any gross motor, neurologic or vascular deficits on exam.  She does report pain with palpation and range of motion of the left knee.  Pulses are equal bilaterally.  No joint laxity.  No visible or palpable bony deformity.      Oral ibuprofen, oral Ultram and oral acetaminophen ordered      XR knee left 4+ views   Final Result   Degenerative/chronic changes similar to prior.        No acute fracture or subluxation.        MACRO:   None.        Signed by: Darnell Wilson 8/6/2025 8:36 AM   Dictation workstation:   [id number]           The patient does not have any evidence of airway compromise or respiratory distress on exam.  She is well-perfused on exam.  No evidence of sepsis.  No visible or palpable bony  deformity.  No joint laxity.  The patient does have pain with palpation and range of motion of the left knee.  X-rays show no evidence of fracture or dislocation.  The patient was placed in a knee immobilizer by nursing staff.  She was neurovascular intact after knee immobilizer application.  Crutches were fitted, trained and dispensed.      The patient was released in good condition in the company of a family member.  She will follow-up with her primary care physician within 1 to 2 days for further management of her current symptoms, to discuss a possible referral to physical therapy and repeat check of her blood pressure.  She was also given a referral to orthopedics.  She will return to the emergency department sooner with worsening of symptoms or onset of new symptoms.  She was cautioned not to drive within 8 hours of last dose of pain medication.  She was given a prescription for acetaminophen and Ultram.      Impression/diagnosis  Left knee sprain  Diastolic hypertension      I reviewed the results of the diagnostic imaging.  Formal radiology reading was completed by the radiologist    Disposition   As a result of the work-up, the patient was discharged home.  she was informed of her diagnosis and instructed to come back with any concerns or worsening of condition.  she and was agreeable to the plan as discussed above.  she was given the opportunity to ask questions.  All of the patient's questions were answered.    Procedures   Procedures        Claudette Goldberg MD  Emergency Medicine                                                       Claudette Goldberg MD  08/06/25 [id number]

## 2025-08-06 NOTE — ED TRIAGE NOTES
Here for left knee pain. Twisted it when getting out of the car on Saturday. Did not fall, no other injuries. Can walk a bit but is very painful

## 2025-08-14 ENCOUNTER — HOSPITAL ENCOUNTER (OUTPATIENT)
Dept: RADIOLOGY | Facility: HOSPITAL | Age: 60
Discharge: HOME | End: 2025-08-14
Payer: COMMERCIAL

## 2025-08-14 ENCOUNTER — APPOINTMENT (OUTPATIENT)
Dept: RADIOLOGY | Facility: HOSPITAL | Age: 60
End: 2025-08-14
Payer: COMMERCIAL

## 2025-08-14 VITALS — BODY MASS INDEX: 34.66 KG/M2 | HEIGHT: 65 IN | WEIGHT: 208 LBS

## 2025-08-14 DIAGNOSIS — R92.8 OTHER ABNORMAL AND INCONCLUSIVE FINDINGS ON DIAGNOSTIC IMAGING OF BREAST: ICD-10-CM

## 2025-08-14 PROCEDURE — 76642 ULTRASOUND BREAST LIMITED: CPT | Mod: LT

## 2025-08-14 PROCEDURE — 77062 BREAST TOMOSYNTHESIS BI: CPT

## 2025-08-14 PROCEDURE — 77066 DX MAMMO INCL CAD BI: CPT | Performed by: STUDENT IN AN ORGANIZED HEALTH CARE EDUCATION/TRAINING PROGRAM

## 2025-08-14 PROCEDURE — 77062 BREAST TOMOSYNTHESIS BI: CPT | Performed by: STUDENT IN AN ORGANIZED HEALTH CARE EDUCATION/TRAINING PROGRAM

## 2025-08-14 PROCEDURE — 76982 USE 1ST TARGET LESION: CPT | Mod: LT

## 2025-08-14 PROCEDURE — 76642 ULTRASOUND BREAST LIMITED: CPT | Performed by: STUDENT IN AN ORGANIZED HEALTH CARE EDUCATION/TRAINING PROGRAM

## 2025-08-18 DIAGNOSIS — E11.9 TYPE 2 DIABETES MELLITUS WITHOUT RETINOPATHY (MULTI): ICD-10-CM

## 2025-08-18 DIAGNOSIS — M54.12 CERVICAL RADICULOPATHY: ICD-10-CM

## 2025-08-19 RX ORDER — GABAPENTIN 300 MG/1
300 CAPSULE ORAL
Qty: 30 CAPSULE | Refills: 2 | Status: SHIPPED | OUTPATIENT
Start: 2025-08-19

## 2025-08-19 RX ORDER — METFORMIN HYDROCHLORIDE 500 MG/1
TABLET, EXTENDED RELEASE ORAL
Qty: 90 TABLET | Refills: 2 | Status: SHIPPED | OUTPATIENT
Start: 2025-08-19

## 2025-08-22 ENCOUNTER — TELEMEDICINE (OUTPATIENT)
Dept: PHARMACY | Facility: HOSPITAL | Age: 60
End: 2025-08-22
Payer: COMMERCIAL

## 2025-08-22 DIAGNOSIS — F17.210 CIGARETTE NICOTINE DEPENDENCE WITHOUT COMPLICATION: ICD-10-CM

## 2025-08-22 DIAGNOSIS — E11.9 TYPE 2 DIABETES MELLITUS WITHOUT RETINOPATHY (MULTI): ICD-10-CM

## 2025-08-22 RX ORDER — BLOOD-GLUCOSE SENSOR
EACH MISCELLANEOUS
Qty: 6 EACH | Refills: 3 | Status: SHIPPED | OUTPATIENT
Start: 2025-08-22

## 2025-08-22 RX ORDER — BLOOD-GLUCOSE,RECEIVER,CONT
EACH MISCELLANEOUS
Qty: 1 EACH | Refills: 0 | Status: SHIPPED | OUTPATIENT
Start: 2025-08-22

## 2025-08-28 ENCOUNTER — APPOINTMENT (OUTPATIENT)
Dept: ORTHOPEDIC SURGERY | Facility: CLINIC | Age: 60
End: 2025-08-28
Payer: COMMERCIAL

## 2025-09-26 ENCOUNTER — APPOINTMENT (OUTPATIENT)
Dept: PHARMACY | Facility: HOSPITAL | Age: 60
End: 2025-09-26
Payer: COMMERCIAL